# Patient Record
Sex: MALE | Race: BLACK OR AFRICAN AMERICAN | Employment: OTHER | URBAN - METROPOLITAN AREA
[De-identification: names, ages, dates, MRNs, and addresses within clinical notes are randomized per-mention and may not be internally consistent; named-entity substitution may affect disease eponyms.]

---

## 2017-01-01 ENCOUNTER — HOSPITAL ENCOUNTER (OUTPATIENT)
Dept: LAB | Age: 67
Discharge: HOME | End: 2017-11-08
Payer: MEDICARE

## 2017-01-01 ENCOUNTER — HOSPITAL ENCOUNTER (OUTPATIENT)
Dept: LAB | Age: 67
Discharge: HOME | End: 2017-12-06
Payer: MEDICARE

## 2017-01-01 ENCOUNTER — HOSPITAL ENCOUNTER (OUTPATIENT)
Dept: LAB | Age: 67
Discharge: HOME | End: 2017-11-15
Payer: MEDICARE

## 2017-01-01 ENCOUNTER — DOCUMENTATION ONLY (OUTPATIENT)
Dept: HEMATOLOGY | Age: 67
End: 2017-01-01

## 2017-01-01 ENCOUNTER — HOSPITAL ENCOUNTER (OUTPATIENT)
Dept: LAB | Age: 67
Discharge: HOME | End: 2017-12-19
Payer: MEDICARE

## 2017-01-01 ENCOUNTER — HOSPITAL ENCOUNTER (OUTPATIENT)
Dept: LAB | Age: 67
Discharge: HOME | End: 2017-11-29
Payer: MEDICARE

## 2017-01-01 ENCOUNTER — HOSPITAL ENCOUNTER (OUTPATIENT)
Dept: INFUSION THERAPY | Age: 67
Discharge: HOME | End: 2017-12-13
Payer: MEDICARE

## 2017-01-01 ENCOUNTER — HOSPITAL ENCOUNTER (OUTPATIENT)
Dept: INFUSION THERAPY | Age: 67
Discharge: HOME | End: 2017-12-06
Payer: MEDICARE

## 2017-01-01 ENCOUNTER — HOSPITAL ENCOUNTER (OUTPATIENT)
Dept: LAB | Age: 67
Discharge: HOME | End: 2017-12-13
Payer: MEDICARE

## 2017-01-01 ENCOUNTER — HOSPITAL ENCOUNTER (OUTPATIENT)
Dept: INFUSION THERAPY | Age: 67
Discharge: HOME | End: 2017-12-27
Payer: MEDICARE

## 2017-01-01 ENCOUNTER — HOSPITAL ENCOUNTER (OUTPATIENT)
Dept: GENERAL RADIOLOGY | Age: 67
Discharge: HOME | End: 2017-11-02
Attending: INTERNAL MEDICINE
Payer: MEDICARE

## 2017-01-01 ENCOUNTER — APPOINTMENT (OUTPATIENT)
Dept: INFUSION THERAPY | Age: 67
End: 2017-01-01

## 2017-01-01 ENCOUNTER — HOSPITAL ENCOUNTER (OUTPATIENT)
Dept: INFUSION THERAPY | Age: 67
Discharge: HOME | End: 2017-11-15
Payer: MEDICARE

## 2017-01-01 ENCOUNTER — HOSPITAL ENCOUNTER (OUTPATIENT)
Dept: INFUSION THERAPY | Age: 67
Discharge: HOME | End: 2017-11-21
Payer: MEDICARE

## 2017-01-01 ENCOUNTER — HOSPITAL ENCOUNTER (OUTPATIENT)
Dept: LAB | Age: 67
Discharge: HOME | End: 2017-11-21
Payer: MEDICARE

## 2017-01-01 ENCOUNTER — HOSPITAL ENCOUNTER (OUTPATIENT)
Dept: CT IMAGING | Age: 67
Discharge: HOME | End: 2017-11-02
Attending: INTERNAL MEDICINE
Payer: MEDICARE

## 2017-01-01 ENCOUNTER — HOSPITAL ENCOUNTER (OUTPATIENT)
Dept: INFUSION THERAPY | Age: 67
Discharge: HOME | End: 2017-12-19
Payer: MEDICARE

## 2017-01-01 ENCOUNTER — HOSPITAL ENCOUNTER (OUTPATIENT)
Dept: INFUSION THERAPY | Age: 67
Discharge: HOME | End: 2017-11-29
Payer: MEDICARE

## 2017-01-01 VITALS
RESPIRATION RATE: 18 BRPM | DIASTOLIC BLOOD PRESSURE: 97 MMHG | BODY MASS INDEX: 21.63 KG/M2 | OXYGEN SATURATION: 98 % | SYSTOLIC BLOOD PRESSURE: 157 MMHG | HEART RATE: 91 BPM | TEMPERATURE: 98.2 F | WEIGHT: 134 LBS

## 2017-01-01 VITALS
DIASTOLIC BLOOD PRESSURE: 82 MMHG | OXYGEN SATURATION: 98 % | RESPIRATION RATE: 18 BRPM | SYSTOLIC BLOOD PRESSURE: 147 MMHG | HEART RATE: 90 BPM

## 2017-01-01 VITALS — DIASTOLIC BLOOD PRESSURE: 87 MMHG | SYSTOLIC BLOOD PRESSURE: 141 MMHG | HEART RATE: 98 BPM

## 2017-01-01 VITALS — BODY MASS INDEX: 22.98 KG/M2 | HEIGHT: 66 IN | WEIGHT: 143 LBS

## 2017-01-01 VITALS
OXYGEN SATURATION: 97 % | TEMPERATURE: 98.3 F | RESPIRATION RATE: 18 BRPM | HEART RATE: 87 BPM | DIASTOLIC BLOOD PRESSURE: 80 MMHG | SYSTOLIC BLOOD PRESSURE: 154 MMHG

## 2017-01-01 DIAGNOSIS — C07 MALIGNANT TUMOR OF PAROTID GLAND (HCC): ICD-10-CM

## 2017-01-01 DIAGNOSIS — C07 MALIGNANT NEOPLASM OF PAROTID GLAND (HCC): Chronic | ICD-10-CM

## 2017-01-01 DIAGNOSIS — J90 PLEURAL EFFUSION: ICD-10-CM

## 2017-01-01 DIAGNOSIS — C76.0 MALIGNANT NEOPLASM OF HEAD, FACE, AND NECK (HCC): ICD-10-CM

## 2017-01-01 DIAGNOSIS — R59.0 MEDIASTINAL LYMPHADENOPATHY: ICD-10-CM

## 2017-01-01 DIAGNOSIS — R59.9 ADENOPATHY: ICD-10-CM

## 2017-01-01 DIAGNOSIS — R97.8 OTHER ABNORMAL TUMOR MARKERS: ICD-10-CM

## 2017-01-01 DIAGNOSIS — Z93.0 TRACHEOSTOMY IN PLACE (HCC): Chronic | ICD-10-CM

## 2017-01-01 LAB
ALBUMIN SERPL-MCNC: 3 G/DL (ref 3.2–4.6)
ALBUMIN SERPL-MCNC: 3.1 G/DL (ref 3.2–4.6)
ALBUMIN SERPL-MCNC: 3.2 G/DL (ref 3.2–4.6)
ALBUMIN SERPL-MCNC: 3.4 G/DL (ref 3.2–4.6)
ALBUMIN SERPL-MCNC: 3.5 G/DL (ref 3.2–4.6)
ALBUMIN/GLOB SERPL: 0.6 {RATIO}
ALBUMIN/GLOB SERPL: 0.7 {RATIO} (ref 1.2–3.5)
ALBUMIN/GLOB SERPL: 0.8 {RATIO} (ref 1.2–3.5)
ALP SERPL-CCNC: 111 U/L (ref 50–136)
ALP SERPL-CCNC: 115 U/L (ref 50–136)
ALP SERPL-CCNC: 119 U/L (ref 50–136)
ALP SERPL-CCNC: 124 U/L (ref 50–136)
ALP SERPL-CCNC: 125 U/L (ref 50–136)
ALP SERPL-CCNC: 144 U/L (ref 50–136)
ALP SERPL-CCNC: 146 U/L (ref 50–136)
ALP SERPL-CCNC: 156 U/L (ref 50–136)
ALT SERPL-CCNC: 19 U/L (ref 12–65)
ALT SERPL-CCNC: 20 U/L (ref 12–65)
ALT SERPL-CCNC: 22 U/L (ref 12–65)
ALT SERPL-CCNC: 23 U/L (ref 12–65)
ALT SERPL-CCNC: 24 U/L (ref 12–65)
ALT SERPL-CCNC: 26 U/L (ref 12–65)
ALT SERPL-CCNC: 28 U/L (ref 12–65)
ALT SERPL-CCNC: 32 U/L (ref 12–65)
ANION GAP SERPL CALC-SCNC: 6 MMOL/L (ref 7–16)
ANION GAP SERPL CALC-SCNC: 7 MMOL/L
ANION GAP SERPL CALC-SCNC: 7 MMOL/L (ref 7–16)
AST SERPL-CCNC: 12 U/L (ref 15–37)
AST SERPL-CCNC: 13 U/L (ref 15–37)
AST SERPL-CCNC: 15 U/L (ref 15–37)
AST SERPL-CCNC: 17 U/L (ref 15–37)
AST SERPL-CCNC: 17 U/L (ref 15–37)
AST SERPL-CCNC: 19 U/L (ref 15–37)
AST SERPL-CCNC: 20 U/L (ref 15–37)
AST SERPL-CCNC: 30 U/L (ref 15–37)
BASOPHILS # BLD: 0 K/UL (ref 0–0.2)
BASOPHILS NFR BLD: 0 % (ref 0–2)
BASOPHILS NFR BLD: 1 % (ref 0–2)
BILIRUB SERPL-MCNC: 0.2 MG/DL (ref 0.2–1.1)
BILIRUB SERPL-MCNC: 0.3 MG/DL (ref 0.2–1.1)
BILIRUB SERPL-MCNC: 0.4 MG/DL (ref 0.2–1.1)
BUN SERPL-MCNC: 14 MG/DL (ref 8–23)
BUN SERPL-MCNC: 16 MG/DL (ref 8–23)
BUN SERPL-MCNC: 18 MG/DL (ref 8–23)
BUN SERPL-MCNC: 18 MG/DL (ref 8–23)
BUN SERPL-MCNC: 20 MG/DL (ref 8–23)
BUN SERPL-MCNC: 22 MG/DL (ref 8–23)
BUN SERPL-MCNC: 22 MG/DL (ref 8–23)
BUN SERPL-MCNC: 24 MG/DL (ref 8–23)
CALCIUM SERPL-MCNC: 8.7 MG/DL (ref 8.3–10.4)
CALCIUM SERPL-MCNC: 8.7 MG/DL (ref 8.3–10.4)
CALCIUM SERPL-MCNC: 8.8 MG/DL (ref 8.3–10.4)
CALCIUM SERPL-MCNC: 8.8 MG/DL (ref 8.3–10.4)
CALCIUM SERPL-MCNC: 8.9 MG/DL (ref 8.3–10.4)
CALCIUM SERPL-MCNC: 9.2 MG/DL (ref 8.3–10.4)
CALCIUM SERPL-MCNC: 9.6 MG/DL (ref 8.3–10.4)
CALCIUM SERPL-MCNC: 9.7 MG/DL (ref 8.3–10.4)
CEA SERPL-MCNC: 38.4 NG/ML (ref 0–3)
CEA SERPL-MCNC: 60.1 NG/ML (ref 0–3)
CHLORIDE SERPL-SCNC: 100 MMOL/L (ref 98–107)
CHLORIDE SERPL-SCNC: 101 MMOL/L (ref 98–107)
CHLORIDE SERPL-SCNC: 102 MMOL/L (ref 98–107)
CHLORIDE SERPL-SCNC: 91 MMOL/L (ref 98–107)
CHLORIDE SERPL-SCNC: 92 MMOL/L (ref 98–107)
CHLORIDE SERPL-SCNC: 93 MMOL/L (ref 98–107)
CHLORIDE SERPL-SCNC: 94 MMOL/L (ref 98–107)
CHLORIDE SERPL-SCNC: 98 MMOL/L (ref 98–107)
CO2 SERPL-SCNC: 29 MMOL/L (ref 21–32)
CO2 SERPL-SCNC: 30 MMOL/L (ref 21–32)
CO2 SERPL-SCNC: 31 MMOL/L (ref 21–32)
CO2 SERPL-SCNC: 32 MMOL/L (ref 21–32)
CO2 SERPL-SCNC: 33 MMOL/L (ref 21–32)
CO2 SERPL-SCNC: 33 MMOL/L (ref 21–32)
CREAT BLD-MCNC: 0.9 MG/DL (ref 0.8–1.5)
CREAT SERPL-MCNC: 0.61 MG/DL (ref 0.8–1.5)
CREAT SERPL-MCNC: 0.79 MG/DL (ref 0.8–1.5)
CREAT SERPL-MCNC: 0.82 MG/DL (ref 0.8–1.5)
CREAT SERPL-MCNC: 0.87 MG/DL (ref 0.8–1.5)
CREAT SERPL-MCNC: 1 MG/DL (ref 0.8–1.5)
CREAT SERPL-MCNC: 1.01 MG/DL (ref 0.8–1.5)
CREAT SERPL-MCNC: 1.01 MG/DL (ref 0.8–1.5)
CREAT SERPL-MCNC: 1.04 MG/DL (ref 0.8–1.5)
DIFFERENTIAL METHOD BLD: ABNORMAL
EOSINOPHIL # BLD: 0 K/UL (ref 0–0.8)
EOSINOPHIL # BLD: 0.1 K/UL (ref 0–0.8)
EOSINOPHIL # BLD: 0.2 K/UL (ref 0–0.8)
EOSINOPHIL NFR BLD: 0 % (ref 0.5–7.8)
EOSINOPHIL NFR BLD: 1 % (ref 0.5–7.8)
EOSINOPHIL NFR BLD: 1 % (ref 0.5–7.8)
EOSINOPHIL NFR BLD: 2 % (ref 0.5–7.8)
EOSINOPHIL NFR BLD: 2 % (ref 0.5–7.8)
EOSINOPHIL NFR BLD: 3 % (ref 0.5–7.8)
ERYTHROCYTE [DISTWIDTH] IN BLOOD BY AUTOMATED COUNT: 14.1 % (ref 11.9–14.6)
ERYTHROCYTE [DISTWIDTH] IN BLOOD BY AUTOMATED COUNT: 14.3 % (ref 11.9–14.6)
ERYTHROCYTE [DISTWIDTH] IN BLOOD BY AUTOMATED COUNT: 14.3 % (ref 11.9–14.6)
ERYTHROCYTE [DISTWIDTH] IN BLOOD BY AUTOMATED COUNT: 14.6 % (ref 11.9–14.6)
ERYTHROCYTE [DISTWIDTH] IN BLOOD BY AUTOMATED COUNT: 15.1 % (ref 11.9–14.6)
ERYTHROCYTE [DISTWIDTH] IN BLOOD BY AUTOMATED COUNT: 15.4 % (ref 11.9–14.6)
ERYTHROCYTE [DISTWIDTH] IN BLOOD BY AUTOMATED COUNT: 15.8 % (ref 11.9–14.6)
ERYTHROCYTE [DISTWIDTH] IN BLOOD BY AUTOMATED COUNT: 16 % (ref 11.9–14.6)
GLOBULIN SER CALC-MCNC: 3.9 G/DL (ref 2.3–3.5)
GLOBULIN SER CALC-MCNC: 3.9 G/DL (ref 2.3–3.5)
GLOBULIN SER CALC-MCNC: 4.1 G/DL (ref 2.3–3.5)
GLOBULIN SER CALC-MCNC: 4.2 G/DL (ref 2.3–3.5)
GLOBULIN SER CALC-MCNC: 4.2 G/DL (ref 2.3–3.5)
GLOBULIN SER CALC-MCNC: 4.5 G/DL (ref 2.3–3.5)
GLOBULIN SER CALC-MCNC: 4.7 G/DL (ref 2.3–3.5)
GLOBULIN SER CALC-MCNC: 5.3 G/DL
GLUCOSE SERPL-MCNC: 104 MG/DL (ref 65–100)
GLUCOSE SERPL-MCNC: 106 MG/DL (ref 65–100)
GLUCOSE SERPL-MCNC: 113 MG/DL (ref 65–100)
GLUCOSE SERPL-MCNC: 115 MG/DL (ref 65–100)
GLUCOSE SERPL-MCNC: 127 MG/DL (ref 65–100)
GLUCOSE SERPL-MCNC: 129 MG/DL (ref 65–100)
GLUCOSE SERPL-MCNC: 145 MG/DL (ref 65–100)
GLUCOSE SERPL-MCNC: 95 MG/DL (ref 65–100)
HCT VFR BLD AUTO: 35.5 % (ref 41.1–50.3)
HCT VFR BLD AUTO: 35.6 % (ref 41.1–50.3)
HCT VFR BLD AUTO: 36.3 % (ref 41.1–50.3)
HCT VFR BLD AUTO: 36.6 % (ref 41.1–50.3)
HCT VFR BLD AUTO: 36.7 % (ref 41.1–50.3)
HCT VFR BLD AUTO: 37.5 % (ref 41.1–50.3)
HCT VFR BLD AUTO: 38 % (ref 41.1–50.3)
HCT VFR BLD AUTO: 38.8 % (ref 41.1–50.3)
HGB BLD-MCNC: 11.7 G/DL (ref 13.6–17.2)
HGB BLD-MCNC: 11.9 G/DL (ref 13.6–17.2)
HGB BLD-MCNC: 12 G/DL (ref 13.6–17.2)
HGB BLD-MCNC: 12.2 G/DL (ref 13.6–17.2)
HGB BLD-MCNC: 12.3 G/DL (ref 13.6–17.2)
HGB BLD-MCNC: 12.5 G/DL (ref 13.6–17.2)
LYMPHOCYTES # BLD: 0.7 K/UL (ref 0.5–4.6)
LYMPHOCYTES # BLD: 0.7 K/UL (ref 0.5–4.6)
LYMPHOCYTES # BLD: 0.8 K/UL (ref 0.5–4.6)
LYMPHOCYTES # BLD: 0.9 K/UL (ref 0.5–4.6)
LYMPHOCYTES # BLD: 1 K/UL (ref 0.5–4.6)
LYMPHOCYTES NFR BLD: 10 % (ref 13–44)
LYMPHOCYTES NFR BLD: 11 % (ref 13–44)
LYMPHOCYTES NFR BLD: 12 % (ref 13–44)
LYMPHOCYTES NFR BLD: 13 % (ref 13–44)
LYMPHOCYTES NFR BLD: 15 % (ref 13–44)
LYMPHOCYTES NFR BLD: 21 % (ref 13–44)
LYMPHOCYTES NFR BLD: 9 % (ref 13–44)
LYMPHOCYTES NFR BLD: 9 % (ref 13–44)
MAGNESIUM SERPL-MCNC: 1.9 MG/DL (ref 1.8–2.4)
MAGNESIUM SERPL-MCNC: 1.9 MG/DL (ref 1.8–2.4)
MCH RBC QN AUTO: 27.8 PG (ref 26.1–32.9)
MCH RBC QN AUTO: 28.2 PG (ref 26.1–32.9)
MCH RBC QN AUTO: 28.2 PG (ref 26.1–32.9)
MCH RBC QN AUTO: 28.3 PG (ref 26.1–32.9)
MCH RBC QN AUTO: 28.7 PG (ref 26.1–32.9)
MCH RBC QN AUTO: 28.7 PG (ref 26.1–32.9)
MCHC RBC AUTO-ENTMCNC: 32 G/DL (ref 31.4–35)
MCHC RBC AUTO-ENTMCNC: 32.2 G/DL (ref 31.4–35)
MCHC RBC AUTO-ENTMCNC: 32.9 G/DL (ref 31.4–35)
MCHC RBC AUTO-ENTMCNC: 32.9 G/DL (ref 31.4–35)
MCHC RBC AUTO-ENTMCNC: 33.5 G/DL (ref 31.4–35)
MCHC RBC AUTO-ENTMCNC: 33.5 G/DL (ref 31.4–35)
MCHC RBC AUTO-ENTMCNC: 33.6 G/DL (ref 31.4–35)
MCHC RBC AUTO-ENTMCNC: 34.2 G/DL (ref 31.4–35)
MCV RBC AUTO: 82.4 FL (ref 79.6–97.8)
MCV RBC AUTO: 82.9 FL (ref 79.6–97.8)
MCV RBC AUTO: 84.2 FL (ref 79.6–97.8)
MCV RBC AUTO: 84.2 FL (ref 79.6–97.8)
MCV RBC AUTO: 86 FL (ref 79.6–97.8)
MCV RBC AUTO: 86.2 FL (ref 79.6–97.8)
MCV RBC AUTO: 89 FL (ref 79.6–97.8)
MCV RBC AUTO: 89.7 FL (ref 79.6–97.8)
MONOCYTES # BLD: 0.3 K/UL (ref 0.1–1.3)
MONOCYTES # BLD: 0.4 K/UL (ref 0.1–1.3)
MONOCYTES # BLD: 0.4 K/UL (ref 0.1–1.3)
MONOCYTES # BLD: 0.5 K/UL (ref 0.1–1.3)
MONOCYTES # BLD: 0.6 K/UL (ref 0.1–1.3)
MONOCYTES # BLD: 0.7 K/UL (ref 0.1–1.3)
MONOCYTES NFR BLD: 10 % (ref 4–12)
MONOCYTES NFR BLD: 6 % (ref 4–12)
MONOCYTES NFR BLD: 7 % (ref 4–12)
MONOCYTES NFR BLD: 8 % (ref 4–12)
NEUTS SEG # BLD: 3 K/UL (ref 1.7–8.2)
NEUTS SEG # BLD: 4.5 K/UL (ref 1.7–8.2)
NEUTS SEG # BLD: 5 K/UL (ref 1.7–8.2)
NEUTS SEG # BLD: 5.4 K/UL (ref 1.7–8.2)
NEUTS SEG # BLD: 5.7 K/UL (ref 1.7–8.2)
NEUTS SEG # BLD: 6.3 K/UL (ref 1.7–8.2)
NEUTS SEG # BLD: 6.5 K/UL (ref 1.7–8.2)
NEUTS SEG # BLD: 6.7 K/UL (ref 1.7–8.2)
NEUTS SEG NFR BLD: 68 % (ref 43–78)
NEUTS SEG NFR BLD: 75 % (ref 43–78)
NEUTS SEG NFR BLD: 78 % (ref 43–78)
NEUTS SEG NFR BLD: 80 % (ref 43–78)
NEUTS SEG NFR BLD: 81 % (ref 43–78)
NEUTS SEG NFR BLD: 83 % (ref 43–78)
NRBC # BLD: 0 K/UL (ref 0–0.2)
PLATELET # BLD AUTO: 279 K/UL (ref 150–450)
PLATELET # BLD AUTO: 290 K/UL (ref 150–450)
PLATELET # BLD AUTO: 296 K/UL (ref 150–450)
PLATELET # BLD AUTO: 308 K/UL (ref 150–450)
PLATELET # BLD AUTO: 309 K/UL (ref 150–450)
PLATELET # BLD AUTO: 315 K/UL (ref 150–450)
PLATELET # BLD AUTO: 316 K/UL (ref 150–450)
PLATELET # BLD AUTO: 325 K/UL (ref 150–450)
PMV BLD AUTO: 8 FL (ref 10.8–14.1)
PMV BLD AUTO: 8.3 FL (ref 10.8–14.1)
PMV BLD AUTO: 8.3 FL (ref 10.8–14.1)
PMV BLD AUTO: 8.5 FL (ref 10.8–14.1)
PMV BLD AUTO: 8.6 FL (ref 10.8–14.1)
PMV BLD AUTO: 9.5 FL (ref 10.8–14.1)
POTASSIUM SERPL-SCNC: 3.4 MMOL/L (ref 3.5–5.1)
POTASSIUM SERPL-SCNC: 3.7 MMOL/L (ref 3.5–5.1)
POTASSIUM SERPL-SCNC: 3.9 MMOL/L (ref 3.5–5.1)
POTASSIUM SERPL-SCNC: 3.9 MMOL/L (ref 3.5–5.1)
POTASSIUM SERPL-SCNC: 4 MMOL/L (ref 3.5–5.1)
POTASSIUM SERPL-SCNC: 4.1 MMOL/L (ref 3.5–5.1)
POTASSIUM SERPL-SCNC: 4.2 MMOL/L (ref 3.5–5.1)
POTASSIUM SERPL-SCNC: 4.4 MMOL/L (ref 3.5–5.1)
PROT SERPL-MCNC: 6.9 G/DL (ref 6.3–8.2)
PROT SERPL-MCNC: 7 G/DL (ref 6.3–8.2)
PROT SERPL-MCNC: 7.2 G/DL (ref 6.3–8.2)
PROT SERPL-MCNC: 7.3 G/DL (ref 6.3–8.2)
PROT SERPL-MCNC: 7.4 G/DL (ref 6.3–8.2)
PROT SERPL-MCNC: 7.6 G/DL (ref 6.3–8.2)
PROT SERPL-MCNC: 8.2 G/DL (ref 6.3–8.2)
PROT SERPL-MCNC: 8.7 G/DL (ref 6.3–8.2)
RBC # BLD AUTO: 4.14 M/UL (ref 4.23–5.67)
RBC # BLD AUTO: 4.18 M/UL (ref 4.23–5.67)
RBC # BLD AUTO: 4.28 M/UL (ref 4.23–5.67)
RBC # BLD AUTO: 4.31 M/UL (ref 4.23–5.67)
RBC # BLD AUTO: 4.36 M/UL (ref 4.23–5.67)
RBC # BLD AUTO: 4.36 M/UL (ref 4.23–5.67)
RBC # BLD AUTO: 4.41 M/UL (ref 4.23–5.67)
RBC # BLD AUTO: 4.44 M/UL (ref 4.23–5.67)
SODIUM SERPL-SCNC: 130 MMOL/L (ref 136–145)
SODIUM SERPL-SCNC: 130 MMOL/L (ref 136–145)
SODIUM SERPL-SCNC: 132 MMOL/L (ref 136–145)
SODIUM SERPL-SCNC: 133 MMOL/L (ref 136–145)
SODIUM SERPL-SCNC: 134 MMOL/L (ref 136–145)
SODIUM SERPL-SCNC: 138 MMOL/L (ref 136–145)
SODIUM SERPL-SCNC: 138 MMOL/L (ref 136–145)
SODIUM SERPL-SCNC: 141 MMOL/L (ref 136–145)
WBC # BLD AUTO: 4.4 K/UL (ref 4.3–11.1)
WBC # BLD AUTO: 5.7 K/UL (ref 4.3–11.1)
WBC # BLD AUTO: 6.7 K/UL (ref 4.3–11.1)
WBC # BLD AUTO: 6.8 K/UL (ref 4.3–11.1)
WBC # BLD AUTO: 7.1 K/UL (ref 4.3–11.1)
WBC # BLD AUTO: 7.6 K/UL (ref 4.3–11.1)
WBC # BLD AUTO: 7.9 K/UL (ref 4.3–11.1)
WBC # BLD AUTO: 8.1 K/UL (ref 4.3–11.1)

## 2017-01-01 PROCEDURE — 96413 CHEMO IV INFUSION 1 HR: CPT

## 2017-01-01 PROCEDURE — 36415 COLL VENOUS BLD VENIPUNCTURE: CPT | Performed by: INTERNAL MEDICINE

## 2017-01-01 PROCEDURE — 96375 TX/PRO/DX INJ NEW DRUG ADDON: CPT

## 2017-01-01 PROCEDURE — 74011000258 HC RX REV CODE- 258: Performed by: INTERNAL MEDICINE

## 2017-01-01 PROCEDURE — 74011250636 HC RX REV CODE- 250/636: Performed by: INTERNAL MEDICINE

## 2017-01-01 PROCEDURE — 85025 COMPLETE CBC W/AUTO DIFF WBC: CPT | Performed by: INTERNAL MEDICINE

## 2017-01-01 PROCEDURE — 83735 ASSAY OF MAGNESIUM: CPT | Performed by: INTERNAL MEDICINE

## 2017-01-01 PROCEDURE — 74011250636 HC RX REV CODE- 250/636

## 2017-01-01 PROCEDURE — 96367 TX/PROPH/DG ADDL SEQ IV INF: CPT

## 2017-01-01 PROCEDURE — 96415 CHEMO IV INFUSION ADDL HR: CPT

## 2017-01-01 PROCEDURE — 80053 COMPREHEN METABOLIC PANEL: CPT | Performed by: INTERNAL MEDICINE

## 2017-01-01 PROCEDURE — 71020 XR CHEST PA LAT: CPT

## 2017-01-01 PROCEDURE — 74011636320 HC RX REV CODE- 636/320: Performed by: INTERNAL MEDICINE

## 2017-01-01 PROCEDURE — 82565 ASSAY OF CREATININE: CPT

## 2017-01-01 PROCEDURE — 74177 CT ABD & PELVIS W/CONTRAST: CPT

## 2017-01-01 PROCEDURE — 82378 CARCINOEMBRYONIC ANTIGEN: CPT | Performed by: INTERNAL MEDICINE

## 2017-01-01 PROCEDURE — 96361 HYDRATE IV INFUSION ADD-ON: CPT

## 2017-01-01 RX ORDER — SODIUM CHLORIDE 0.9 % (FLUSH) 0.9 %
10 SYRINGE (ML) INJECTION AS NEEDED
Status: ACTIVE | OUTPATIENT
Start: 2017-01-01 | End: 2017-01-01

## 2017-01-01 RX ORDER — DIPHENHYDRAMINE HYDROCHLORIDE 50 MG/ML
50 INJECTION, SOLUTION INTRAMUSCULAR; INTRAVENOUS ONCE
Status: COMPLETED | OUTPATIENT
Start: 2017-01-01 | End: 2017-01-01

## 2017-01-01 RX ORDER — HEPARIN 100 UNIT/ML
300-500 SYRINGE INTRAVENOUS AS NEEDED
Status: ACTIVE | OUTPATIENT
Start: 2017-01-01 | End: 2017-01-01

## 2017-01-01 RX ORDER — DEXAMETHASONE SODIUM PHOSPHATE 4 MG/ML
8 INJECTION, SOLUTION INTRA-ARTICULAR; INTRALESIONAL; INTRAMUSCULAR; INTRAVENOUS; SOFT TISSUE ONCE
Status: COMPLETED | OUTPATIENT
Start: 2017-01-01 | End: 2017-01-01

## 2017-01-01 RX ORDER — DEXAMETHASONE SODIUM PHOSPHATE 100 MG/10ML
10 INJECTION INTRAMUSCULAR; INTRAVENOUS ONCE
Status: COMPLETED | OUTPATIENT
Start: 2017-01-01 | End: 2017-01-01

## 2017-01-01 RX ORDER — LORAZEPAM 2 MG/ML
0.5 INJECTION INTRAMUSCULAR
Status: ACTIVE | OUTPATIENT
Start: 2017-01-01 | End: 2017-01-01

## 2017-01-01 RX ORDER — HYDROCORTISONE SODIUM SUCCINATE 100 MG/2ML
100 INJECTION, POWDER, FOR SOLUTION INTRAMUSCULAR; INTRAVENOUS AS NEEDED
Status: ACTIVE | OUTPATIENT
Start: 2017-01-01 | End: 2017-01-01

## 2017-01-01 RX ORDER — ONDANSETRON 2 MG/ML
8 INJECTION INTRAMUSCULAR; INTRAVENOUS ONCE
Status: COMPLETED | OUTPATIENT
Start: 2017-01-01 | End: 2017-01-01

## 2017-01-01 RX ORDER — SODIUM CHLORIDE 0.9 % (FLUSH) 0.9 %
10 SYRINGE (ML) INJECTION
Status: COMPLETED | OUTPATIENT
Start: 2017-01-01 | End: 2017-01-01

## 2017-01-01 RX ADMIN — DIATRIZOATE MEGLUMINE AND DIATRIZOATE SODIUM 15 ML: 660; 100 LIQUID ORAL; RECTAL at 15:37

## 2017-01-01 RX ADMIN — SODIUM CHLORIDE 500 ML: 900 INJECTION, SOLUTION INTRAVENOUS at 12:00

## 2017-01-01 RX ADMIN — CETUXIMAB 430 MG: 2 SOLUTION INTRAVENOUS at 15:31

## 2017-01-01 RX ADMIN — Medication 10 ML: at 15:30

## 2017-01-01 RX ADMIN — DEXAMETHASONE SODIUM PHOSPHATE 10 MG: 10 INJECTION INTRAMUSCULAR; INTRAVENOUS at 09:19

## 2017-01-01 RX ADMIN — DIPHENHYDRAMINE HYDROCHLORIDE 50 MG: 50 INJECTION, SOLUTION INTRAMUSCULAR; INTRAVENOUS at 12:18

## 2017-01-01 RX ADMIN — ONDANSETRON 8 MG: 2 INJECTION INTRAMUSCULAR; INTRAVENOUS at 15:38

## 2017-01-01 RX ADMIN — SODIUM CHLORIDE 500 ML: 900 INJECTION, SOLUTION INTRAVENOUS at 09:00

## 2017-01-01 RX ADMIN — DIPHENHYDRAMINE HYDROCHLORIDE 50 MG: 50 INJECTION, SOLUTION INTRAMUSCULAR; INTRAVENOUS at 15:41

## 2017-01-01 RX ADMIN — ONDANSETRON 8 MG: 2 INJECTION INTRAMUSCULAR; INTRAVENOUS at 09:15

## 2017-01-01 RX ADMIN — DIPHENHYDRAMINE HYDROCHLORIDE 50 MG: 50 INJECTION, SOLUTION INTRAMUSCULAR; INTRAVENOUS at 10:19

## 2017-01-01 RX ADMIN — SODIUM CHLORIDE 500 ML: 900 INJECTION, SOLUTION INTRAVENOUS at 12:53

## 2017-01-01 RX ADMIN — SODIUM CHLORIDE 500 ML: 900 INJECTION, SOLUTION INTRAVENOUS at 15:37

## 2017-01-01 RX ADMIN — CETUXIMAB 430 MG: 2 SOLUTION INTRAVENOUS at 13:22

## 2017-01-01 RX ADMIN — ONDANSETRON 8 MG: 2 INJECTION INTRAMUSCULAR; INTRAVENOUS at 10:17

## 2017-01-01 RX ADMIN — SODIUM CHLORIDE 500 ML: 900 INJECTION, SOLUTION INTRAVENOUS at 15:00

## 2017-01-01 RX ADMIN — Medication 10 ML: at 12:13

## 2017-01-01 RX ADMIN — SODIUM CHLORIDE 100 ML: 900 INJECTION, SOLUTION INTRAVENOUS at 15:34

## 2017-01-01 RX ADMIN — CETUXIMAB 430 MG: 2 SOLUTION INTRAVENOUS at 16:18

## 2017-01-01 RX ADMIN — DEXAMETHASONE SODIUM PHOSPHATE 8 MG: 4 INJECTION, SOLUTION INTRAMUSCULAR; INTRAVENOUS at 15:24

## 2017-01-01 RX ADMIN — CETUXIMAB 430 MG: 2 SOLUTION INTRAVENOUS at 15:59

## 2017-01-01 RX ADMIN — SODIUM CHLORIDE 150 MG: 900 INJECTION, SOLUTION INTRAVENOUS at 15:55

## 2017-01-01 RX ADMIN — Medication 10 ML: at 14:59

## 2017-01-01 RX ADMIN — SODIUM CHLORIDE 500 ML: 900 INJECTION, SOLUTION INTRAVENOUS at 14:45

## 2017-01-01 RX ADMIN — DIPHENHYDRAMINE HYDROCHLORIDE 50 MG: 50 INJECTION, SOLUTION INTRAMUSCULAR; INTRAVENOUS at 15:21

## 2017-01-01 RX ADMIN — IOPAMIDOL 100 ML: 755 INJECTION, SOLUTION INTRAVENOUS at 15:34

## 2017-01-01 RX ADMIN — CETUXIMAB 688 MG: 2 SOLUTION INTRAVENOUS at 12:05

## 2017-01-01 RX ADMIN — Medication 10 ML: at 15:31

## 2017-01-01 RX ADMIN — SODIUM CHLORIDE 150 MG: 900 INJECTION, SOLUTION INTRAVENOUS at 09:52

## 2017-01-01 RX ADMIN — SODIUM CHLORIDE 1500 ML: 900 INJECTION, SOLUTION INTRAVENOUS at 12:14

## 2017-01-01 RX ADMIN — CETUXIMAB 430 MG: 2 SOLUTION INTRAVENOUS at 10:29

## 2017-01-01 RX ADMIN — CETUXIMAB 430 MG: 2 SOLUTION INTRAVENOUS at 14:02

## 2017-01-01 RX ADMIN — DIPHENHYDRAMINE HYDROCHLORIDE 50 MG: 50 INJECTION, SOLUTION INTRAMUSCULAR; INTRAVENOUS at 09:30

## 2017-01-01 RX ADMIN — DIPHENHYDRAMINE HYDROCHLORIDE 50 MG: 50 INJECTION, SOLUTION INTRAMUSCULAR; INTRAVENOUS at 12:52

## 2017-01-01 RX ADMIN — SODIUM CHLORIDE 150 MG: 900 INJECTION, SOLUTION INTRAVENOUS at 10:30

## 2017-01-01 RX ADMIN — Medication 10 ML: at 15:34

## 2017-01-01 RX ADMIN — DIPHENHYDRAMINE HYDROCHLORIDE 50 MG: 50 INJECTION, SOLUTION INTRAMUSCULAR; INTRAVENOUS at 15:18

## 2017-01-01 RX ADMIN — DEXAMETHASONE SODIUM PHOSPHATE 10 MG: 10 INJECTION INTRAMUSCULAR; INTRAVENOUS at 10:18

## 2017-01-01 RX ADMIN — DEXAMETHASONE SODIUM PHOSPHATE 10 MG: 10 INJECTION INTRAMUSCULAR; INTRAVENOUS at 15:39

## 2017-01-01 RX ADMIN — Medication 10 ML: at 09:00

## 2017-01-18 ENCOUNTER — APPOINTMENT (OUTPATIENT)
Dept: GENERAL RADIOLOGY | Age: 67
End: 2017-01-18
Payer: MEDICARE

## 2017-01-18 ENCOUNTER — HOSPITAL ENCOUNTER (EMERGENCY)
Age: 67
Discharge: HOME | End: 2017-01-18
Payer: MEDICARE

## 2017-01-18 VITALS
HEART RATE: 95 BPM | TEMPERATURE: 98 F | SYSTOLIC BLOOD PRESSURE: 163 MMHG | DIASTOLIC BLOOD PRESSURE: 93 MMHG | RESPIRATION RATE: 16 BRPM | BODY MASS INDEX: 24.11 KG/M2 | OXYGEN SATURATION: 97 % | WEIGHT: 150 LBS | HEIGHT: 66 IN

## 2017-01-18 DIAGNOSIS — Z43.0 ENCOUNTER FOR TRACHEOSTOMY TUBE CHANGE (HCC): Primary | ICD-10-CM

## 2017-01-18 PROCEDURE — 70360 X-RAY EXAM OF NECK: CPT

## 2017-01-18 PROCEDURE — 99283 EMERGENCY DEPT VISIT LOW MDM: CPT

## 2017-01-18 NOTE — PROGRESS NOTES
Pt trach changed. Pt brought his own supplies from home. tach has been placed for 8 months according to pt. Pt appears to be very adequate at trach care. No complications, or distress noted at this time.

## 2017-01-18 NOTE — ED PROVIDER NOTES
HPI Comments: 40-year-old man complaining of problems with his tracheostomy. He stated he felt something was not working properly and that something was stuck behind a trach. He came in for trach change. Patient is a 77 y.o. male presenting with tracheostomy change. The history is provided by the patient. Trach Change   This is a recurrent problem. The current episode started more than 2 days ago. The problem has not changed since onset. Associated symptoms include sputum production. Pertinent negatives include no fever, no headaches, no sore throat, no ear pain, no neck pain, no hemoptysis and no wheezing. He has tried nothing for the symptoms. Associated medical issues include COPD. Past Medical History:   Diagnosis Date    Airway obstruction 4/20/2016    Allergic rhinitis     Anxiety 3/24/2016    BPH (benign prostatic hypertrophy)     Cerumen impaction     Chronic otitis media     Chronic pharyngitis     Condyloma acuminatum     Dyslipidemia 3/24/2016    Dyspnea 4/8/2016    ETD (eustachian tube dysfunction)     Former smoker     GERD (gastroesophageal reflux disease)      at times    History of condyloma acuminatum      on tongue    History of parotid cancer      History of sinus cancer 1989    Hypercholesterolemia 9/14/2016    Hypertension     Hypotension 4/21/2016    Hypothyroidism       secondary to radiation to neck    Ill-defined condition      left face twitching    Impotence of organic origin     Male stress incontinence 12/23/2014    Malignant neoplasm of head, face, and neck ([address] -- prior history.     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     cancer in \"salivary gland\"    Malignant neoplasm of prostate ([address]     Mixed hearing loss     Nasopharyngeal cancer ([address]      w/XRT    Neoplasm of uncertain behavior of tongue     Otitis media     Parotid gland adenocarcinoma (HCC)     Perforation of tympanic membrane     Personal history of prostate cancer  Prostate cancer [address] [address]      diagnosed 2/24/11    SOB (shortness of breath)     Stridor 3/24/2016    Tracheal mass -- reproted sub-glotic 3/25/2016    Tracheal obstruction 3/24/2016    Tracheostomy in place 6/20/2016    Vocal cord paralysis 4/20/2016       Past Surgical History:   Procedure Laterality Date    Hx radical prostatectomy  5/2011      Dr. Ellen Saba in [facility] [address] Hx gi       esophageal dilation    Hx heent  2009     Removal lymph nodes/saliva glands and radiation/chemo    Hx heent  2010     left total parotidectomy & bilateral radical neck dissection    Pr sinus surgery proc unlisted  1989     lymph nodes removed    Hx myringotomy Bilateral 2010, 2011, 2012 2010-Dr. Tiffanie Fishman         Family History:   Problem Relation Age of Onset    Hypertension Mother     Cancer Mother      breast    Hypertension Father     Heart Disease Father        Social History     Social History    Marital status: SINGLE     Spouse name: N/A    Number of children: N/A    Years of education: N/A     Occupational History    [employer]      11 years     Social History Main Topics    Smoking status: Former Smoker     Packs/day: 0.25     Years: 8.00     Types: Cigarettes     Quit date: 4/20/1976    Smokeless tobacco: Never Used      Comment: Quit in the 1970s    Alcohol use No    Drug use: No    Sexual activity: Not on file     Other Topics Concern    Not on file     Social History Narrative    Lives at home and his sister and son live with him. ALLERGIES: Levofloxacin; Other plant, animal, environmental; and Sulfa (sulfonamide antibiotics)    Review of Systems   Constitutional: Negative. Negative for activity change and fever. HENT: Negative. Negative for ear pain and sore throat. Eyes: Negative. Respiratory: Positive for sputum production. Negative for hemoptysis and wheezing. Cardiovascular: Negative. Gastrointestinal: Negative. Genitourinary: Negative.     Musculoskeletal: Negative. Negative for neck pain. Skin: Negative. Neurological: Negative. Negative for headaches. Psychiatric/Behavioral: Negative. All other systems reviewed and are negative. Vitals:    01/18/17 1352   BP: (!) 151/91   Pulse: 98   Resp: 16   Temp: 98 °F (36.7 °C)   SpO2: 96%   Weight: 68 kg (150 lb)   Height: 5' 6\" (1.676 m)            Physical Exam   Constitutional: He is oriented to person, place, and time. He appears well-developed and well-nourished. No distress. HENT:   Head: Normocephalic and atraumatic. Right Ear: External ear normal.   Left Ear: External ear normal.   Nose: Nose normal.   Mouth/Throat: Oropharynx is clear and moist. No oropharyngeal exudate. Eyes: Conjunctivae and EOM are normal. Pupils are equal, round, and reactive to light. Right eye exhibits no discharge. Left eye exhibits no discharge. No scleral icterus. Neck: Normal range of motion. Neck supple. No JVD present. tracheostomy   Cardiovascular: Normal rate, regular rhythm and intact distal pulses. Pulmonary/Chest: Effort normal and breath sounds normal. No stridor. No respiratory distress. He has no wheezes. He exhibits no tenderness. Abdominal: Soft. Bowel sounds are normal. He exhibits no distension and no mass. There is no tenderness. Musculoskeletal: Normal range of motion. He exhibits no edema or tenderness. Neurological: He is alert and oriented to person, place, and time. No cranial nerve deficit. Skin: Skin is warm and dry. No rash noted. He is not diaphoretic. No erythema. No pallor. Psychiatric: He has a normal mood and affect. His behavior is normal. Thought content normal.   Nursing note and vitals reviewed. MDM  Number of Diagnoses or Management Options  Diagnosis management comments: We'll check x-ray and refer him to Dr. Selene Siemens pulmonary. Patient has tried acute change successfully and was breathing easily.        Amount and/or Complexity of Data Reviewed  Clinical lab tests: ordered and reviewed  Tests in the radiology section of CPT®: ordered and reviewed  Tests in the medicine section of CPT®: ordered and reviewed    Risk of Complications, Morbidity, and/or Mortality  Presenting problems: high  Diagnostic procedures: high  Management options: high      ED Course       Procedures

## 2017-01-18 NOTE — ED NOTES
The patient was given their discharge instructions and  was not given prescriptions. The  patient verbalized understanding and had no additional questions. The patient was alert and was discharged via Ambulatory, without additional complaints at time of discharge.   No apparent distress noted

## 2017-01-18 NOTE — DISCHARGE INSTRUCTIONS
Your Tracheostomy: Care Instructions  Your Care Instructions  A tracheostomy is a permanent opening through the neck into the windpipe, or trachea. This opening makes breathing easier if you have a lung or nerve problem or an infection that makes it hard to breathe. Taking good care of a tracheostomy is very important. It can prevent infections and help keep you breathing easily. Follow-up care is a key part of your treatment and safety. Be sure to make and go to all appointments, and call your doctor if you are having problems. Its also a good idea to know your test results and keep a list of the medicines you take. How can you care for yourself at home? General tips  · Always wash your hands before and after caring for your tracheostomy. · Keep the air in your home moist with a room vaporizer. · Eat while sitting up. If any food gets into the tube, suction it out right away. · Wear clothing that is loose around your neck. · In a bath or shower, avoid getting water into the tracheostomy. Cover the tube so that no water gets in but you can still breathe. · Do not swim. · Replace the tube rocha if it gets wet or damaged. If it is not damaged, it can be washed and dried and used again. · Your tracheostomy, or \"trach\" (say \"trayk\"), has three parts: the outer cannula, the inner cannula, and the obturator. The inner cannula is the piece that you will remove and clean. · Your doctor may give you more instructions. Follow them closely. Suctioning  · Suction the trach 3 to 4 times a day, or more if needed. For example, do it before you go to bed and when you wake up in the morning. · You will need suction catheters, a suction machine, saline fluid, a small cup, and a mirror. · Wash your hands with soap and water for at least 30 seconds. · Pour saline fluid into the cup. · Connect a catheter to the suction machine tubing. Dip the catheter tip into the saline fluid.  Insert the wet catheter into the trach. Push the tube in gently, about 3 to 4 inches, until you feel it hit something. · Slowly pull the catheter out of the trach, rolling it back and forth between your fingers, with your thumb over the control valve (this turns the suction on). Do not keep the suction on for more than 10 seconds at a time. · Wait about 30 seconds and repeat inserting and pulling out the tube until all the mucus has been removed. Then suction the saline left in the cup. · Throw away the used catheter, and wash your hands again. Stoma care  A stoma is the opening in your neck. · Clean and dry the stoma 3 times a day. Do not let crust form on the skin at the stoma. · You will need hydrogen peroxide, tap or sterile water, 8 or 10 cotton swabs, 2 small cups, a dry cloth, a mirror, and ointment for the skin. · Wash your hands with soap and water for at least 30 seconds. · Fill one cup with half water and half hydrogen peroxide. Put 3 or 4 cotton swabs in the cup. Fill the other cup with water and put 3 or 4 swabs in that cup. · Clean and remove dried mucus around the stoma with the cotton swabs in the peroxide cup. Then clean off any peroxide with the swabs in the water cup. · Dry your skin with the cloth. Apply ointment with the remaining swabs. · Wash your hands again. Cleaning the inner cannula  A cannula is the tube that fits into the stoma. · Clean and replace the inner cannula 2 or 3 times each day. You will need 2 small bowls, a small brush, hydrogen peroxide, water, and a mirror. · Wash your hands with soap and water for at least 30 seconds. · Pour half water and half hydrogen peroxide into one bowl. Pour a small amount of water in the other bowl. · Unlock the inner cannula and remove it by gently pulling it out and down. Put this into the peroxide bowl to soak. Clean the inside and outside of the cannula with the brush. · Rinse the cannula under tap water, then let it soak in the bowl of water.  Shake the cannula out and slide it gently back into the outer cannula. Turn it to lock it in place. Make sure it is locked in place and you cannot pull it out. · Wash your hands again. When should you call for help? Call 911 anytime you think you may need emergency care. For example, call if:  · You have severe trouble breathing, and coughing or suctioning does not help. · Your trach falls out and you cannot get it back in. Call your doctor now or seek immediate medical care if:  · You have trouble breathing after suctioning. · You have signs of infection, such as:  ¨ Increased pain, swelling, warmth, or redness in the stoma. ¨ Red streaks leading from the stoma. ¨ Pus draining from the stoma. ¨ A fever. Watch closely for changes in your health, and be sure to contact your doctor if you have any problems. Where can you learn more? Go to http://carol-joanie.info/. Enter N364 in the search box to learn more about \"Your Tracheostomy: Care Instructions. \"  Current as of: July 29, 2016  Content Version: 11.1  © 3335-8796 [employer]. Care instructions adapted under license by [employer] (which disclaims liability or warranty for this information). If you have questions about a medical condition or this instruction, always ask your healthcare professional. [place] [address] any warranty or liability for your use of this information.

## 2017-01-26 ENCOUNTER — SURGERY (OUTPATIENT)
Age: 67
End: 2017-01-26

## 2017-01-26 ENCOUNTER — HOSPITAL ENCOUNTER (OUTPATIENT)
Age: 67
Setting detail: OUTPATIENT SURGERY
Discharge: HOME | End: 2017-01-26
Attending: INTERNAL MEDICINE | Admitting: INTERNAL MEDICINE
Payer: MEDICARE

## 2017-01-26 VITALS
DIASTOLIC BLOOD PRESSURE: 89 MMHG | BODY MASS INDEX: 23.07 KG/M2 | WEIGHT: 147 LBS | TEMPERATURE: 98.2 F | RESPIRATION RATE: 18 BRPM | SYSTOLIC BLOOD PRESSURE: 161 MMHG | HEART RATE: 78 BPM | HEIGHT: 67 IN | OXYGEN SATURATION: 100 %

## 2017-01-26 PROCEDURE — 99211 OFF/OP EST MAY X REQ PHY/QHP: CPT | Performed by: INTERNAL MEDICINE

## 2017-01-26 DEVICE — TRACHEOSTOMY TUBE CUFFLESS WITH INNER CANNULA
Type: IMPLANTABLE DEVICE | Site: NECK | Status: FUNCTIONAL
Brand: SHILEY

## 2017-01-26 NOTE — PROGRESS NOTES
Pt comes in today for trach change. Consent obtained. Time out performed. Pt's site looks good. Pt is using PMV for speaking. Old trach removed without difficulty, new size #6 Shiley Cuffles inserted into stoma without difficulty and secured with same trach ties. Discharge instructions reviewed with patient. Pt to follow up with [facility]monary. Pt d/gennaro via ambulating. NO resp distress noted. All vitals stable upon discharge.

## 2017-05-08 ENCOUNTER — HOSPITAL ENCOUNTER (EMERGENCY)
Age: 67
Discharge: HOME | End: 2017-05-08
Attending: EMERGENCY MEDICINE
Payer: MEDICARE

## 2017-05-08 ENCOUNTER — APPOINTMENT (OUTPATIENT)
Dept: GENERAL RADIOLOGY | Age: 67
End: 2017-05-08
Attending: EMERGENCY MEDICINE
Payer: MEDICARE

## 2017-05-08 VITALS
BODY MASS INDEX: 26.58 KG/M2 | RESPIRATION RATE: 20 BRPM | WEIGHT: 150 LBS | SYSTOLIC BLOOD PRESSURE: 138 MMHG | HEIGHT: 63 IN | TEMPERATURE: 98.5 F | HEART RATE: 92 BPM | DIASTOLIC BLOOD PRESSURE: 96 MMHG | OXYGEN SATURATION: 100 %

## 2017-05-08 DIAGNOSIS — K59.03 DRUG-INDUCED CONSTIPATION: Primary | ICD-10-CM

## 2017-05-08 PROCEDURE — 99283 EMERGENCY DEPT VISIT LOW MDM: CPT | Performed by: EMERGENCY MEDICINE

## 2017-05-08 PROCEDURE — 74000 XR ABD (KUB): CPT

## 2017-05-08 RX ORDER — POLYETHYLENE GLYCOL 3350 17 G/17G
17 POWDER, FOR SOLUTION ORAL DAILY
Qty: 238 G | Refills: 0 | Status: SHIPPED | OUTPATIENT
Start: 2017-05-08 | End: 2017-01-01

## 2017-05-09 NOTE — ED PROVIDER NOTES
HPI Comments: Patient is a 78-year-old male with a feeding tube and tracheostomy secondary to cancer. States that he was started on some new pain medication a few days ago and now he has not had a bowel movement in 2 days. He denies any vomiting. He is tolerating his PEG tube feeds without difficulty. He denies any abdominal pain. Patient is a 77 y.o. male presenting with constipation and tracheostomy change. The history is provided by the patient. Constipation    This is a new problem. The current episode started 2 days ago. Associated symptoms include constipation. Pertinent negatives include no abdominal pain, no flatus, no abdominal distention, no vomiting and no diarrhea. He has tried nothing for the symptoms. Trach Change   Pertinent negatives include no vomiting and no abdominal pain. Past Medical History:   Diagnosis Date    Airway obstruction 4/20/2016    Allergic rhinitis     Anxiety 3/24/2016    BPH (benign prostatic hypertrophy)     Cerumen impaction     Chronic otitis media     Chronic pharyngitis     Condyloma acuminatum     Dyslipidemia 3/24/2016    Dyspnea 4/8/2016    ETD (eustachian tube dysfunction)     Former smoker     GERD (gastroesophageal reflux disease)     at times    History of condyloma acuminatum     on tongue    History of parotid cancer      History of sinus cancer 1989    Hypercholesterolemia 9/14/2016    Hypertension     Hypotension 4/21/2016    Hypothyroidism      secondary to radiation to neck    Ill-defined condition     left face twitching    Impotence of organic origin     Male stress incontinence 12/23/2014    Malignant neoplasm of head, face, and neck ([address] -- prior history.     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     Perforation of tympanic membrane     Personal history of prostate cancer     Prostate cancer ([facility] [address]     diagnosed 2/24/11    SOB (shortness of breath)     Stridor 3/24/2016    Tracheal mass -- reproted sub-glotic 3/25/2016    Tracheal obstruction 3/24/2016    Tracheostomy in place [facility]) 6/20/2016    Vocal cord paralysis 4/20/2016       Past Surgical History:   Procedure Laterality Date    HX GI      esophageal dilation    HX HEENT  2009    Removal lymph nodes/saliva glands and radiation/chemo    HX HEENT  2010    left total parotidectomy & bilateral radical neck dissection    HX MYRINGOTOMY Bilateral 2010, 2011, 2012 2010-Dr. Vitaly Mcdowell  5/2011     Dr. Jose E Pearce in [place]    lymph nodes removed         Family History:   Problem Relation Age of Onset    Hypertension Mother     Cancer Mother      breast    Hypertension Father     Heart Disease Father        Social History     Social History    Marital status: SINGLE     Spouse name: N/A    Number of children: N/A    Years of education: N/A     Occupational History    [employer]      11 years     Social History Main Topics    Smoking status: Former Smoker     Packs/day: 0.25     Years: 8.00     Types: Cigarettes     Quit date: 4/20/1976    Smokeless tobacco: Never Used      Comment: Quit in the 1970s    Alcohol use No    Drug use: No    Sexual activity: Not on file     Other Topics Concern    Not on file     Social History Narrative    Lives at home and his sister and son live with him. ALLERGIES: Levofloxacin; Other plant, animal, environmental; and Sulfa (sulfonamide antibiotics)    Review of Systems   Constitutional: Negative. HENT: Negative. Eyes: Negative. Respiratory: Negative. Cardiovascular: Negative. Gastrointestinal: Positive for constipation.  Negative for abdominal distention, abdominal pain, blood in stool, diarrhea, flatus, rectal pain and vomiting. Endocrine: Negative. Genitourinary: Negative. Neurological: Negative. Vitals:    05/08/17 2053   BP: (!) 152/101   Pulse: 94   Resp: 24   Temp: 98.6 °F (37 °C)   SpO2: 98%   Weight: 68 kg (150 lb)   Height: 5' 3\" (1.6 m)            Physical Exam   Constitutional: He appears well-developed and well-nourished. HENT:   Head: Normocephalic and atraumatic. Cardiovascular: Normal rate and regular rhythm. Abdominal: Soft. There is no tenderness. There is no rebound and no guarding. PEG tube site in the left upper quadrant. Genitourinary: Rectum normal. Rectal exam shows guaiac negative stool. Genitourinary Comments: no impaction or stool in the vault. Skin: Skin is warm and dry. Vitals reviewed. MDM  Number of Diagnoses or Management Options  Diagnosis management comments: 60-year-old male with no stool output in the past 2 days. Different differential diagnosis includes constipation, bowel obstruction, fecal impaction    There is no impaction on rectal exam abdominal exam is completely benign as not vomiting. He's been eating and drinking normally. We'll place him on some MiraLAX and I have advised him to follow up with his doctor.        Amount and/or Complexity of Data Reviewed  Tests in the radiology section of CPT®: reviewed    Risk of Complications, Morbidity, and/or Mortality  Presenting problems: low  Diagnostic procedures: low  Management options: low      ED Course       Procedures

## 2017-05-09 NOTE — ED TRIAGE NOTES
Patient arrives to the ER via POV. Patient states he has not had any output in his ostomy in three maybe four days. Patient states he has been eating and drinking appropriately. Patient also states in triage he thinks he has some mucous in his trach. Patient denies SOB, CP, or difficulty swallowing.

## 2017-09-01 PROBLEM — J90 PLEURAL EFFUSION: Status: ACTIVE | Noted: 2017-01-01

## 2017-09-01 PROBLEM — R59.0 MEDIASTINAL LYMPHADENOPATHY: Status: ACTIVE | Noted: 2017-01-01

## 2017-11-10 NOTE — PROGRESS NOTES
I spoke with Mr. Lombardi regarding his insurance coverage, potential oral medication authorizations, enrollment in the [facility] [employer] ([facility]) and the [facility] ([address]), and assistance organization resource sheet. Mr. Dayday Cedeño has Medicare and a Medicare supplement plan N with [employer]. The only patient responsibility Mr. Lombardi has for approved claims is the Medicare part B deductible. I gave Mr. Lombardi the Oncology Care Model participation letter. I let him know that the average patient responsibility for 6 months of treatment for type cancer is $8,025 after Medicare pays. Next, I spoke with Mr. Lombardi regarding potential oral medication authorizations. I told him that if he ever had any problems getting his oral medications filled to give the dedicated [facility] , Edilma Sue, a call. Most of the time, it is simply an authorization that needs to be done with the insurance company. Next, I spoke with Mr. Lombardi regarding enrolling with [facility] and [facility]. I went over some of the services that [facility] and [facility] offers and the enrollment process. Lastly, I gave Mr. Lombardi a form with various resource organizations that could assist with specific needs (example:  transportation, lodging, preparing meals, home cleaning)     Faxed Patient Referral form to the [facility] [employer] at [phone]. Phone [phone]. Form scanned into chart. Faxed Physician's Statement to the [facility] at [phone]. Phone [phone]. Form scanned into chart.

## 2017-11-15 NOTE — PROGRESS NOTES
Arrived to the [facility]. D1C1 Erbitux completed. Patient tolerated without problems. Patient stayed for 1 hour post infusion observation without s/sx of reaction  Any issues or concerns during appointment: extended wait time for Erbitux medication to be delivered to the [facility]. Patient did not have Zofran or Compazine at home, medications sent to pharmacy by Triage.  Patient and his sister were given written instruction regarding use of antiemetics at home  Patient aware of next infusion appointment on 11/21/17 at 200  Discharged via [facility] with family

## 2017-11-21 NOTE — PROGRESS NOTES
Arrived to the [facility]. Erbitux completed. Patient tolerated well. Any issues or concerns during appointment: none. Patient aware of next infusion appointment on 11/29 (date) at [phone] (time). Discharged home.

## 2017-11-29 NOTE — PROGRESS NOTES
Pt arrived via w/c to [facility]. IV established with good blood return. NS 1500 ml infusing. Benadryl given IV as ordered. Erbitux infused over 1 hour. Report giiven to [employer].

## 2017-11-29 NOTE — PROGRESS NOTES
Erbitux completed and remainder of 1500 mL bolus infused. Patient tolerated well. PIV removed. VSS upon discharge. Discharged via wheelchair accompanied by spouse.      Brenda Coffey RN

## 2017-12-06 NOTE — PROGRESS NOTES
Pt arrived via w/c to [facility]. IV established with good blood return. NS infusing. Pre meds given IV as ordered. erbitux infused over 1 hour. Pt monitored. Pt tolerated well. IV discontinued with tip intact Pt aware of next appt on 12/13/17 at 1500. Pt discharged via w/c.

## 2017-12-13 NOTE — PROGRESS NOTES
Pt arrived via w/c to [facility]. IV established with good blood return. NS infusing. Benadryl 50 mg given iv. erbitux infused over 1 hour. Pt monitored. Pt tolerated well. IV discontinued with tip intact. Pt aware of next appt on 1/17/17 at 0915.  Pt discharged via w/c

## 2017-12-19 NOTE — PROGRESS NOTES
Arrived to the [facility] via [facility] with his sister. chemo completed. Patient tolerated well. Any issues or concerns during appointment:  no.  Patient aware of next infusion appointment on  12/27 at 0800. Discharged to home via [facility].

## 2017-12-27 NOTE — PROGRESS NOTES
Arrived to the [facility] via [facility] with his sister. chemo completed. Patient tolerated well. Patient communicates by writing questions and family member. Questions asked about having a port placed -  suggested they discuss with Dr. Parth Estrada. Any issues or concerns during appointment:  no.  Patient aware of next infusion appointment on 1/2/18 @ 1400.   Discharged to home via [facility]

## 2018-01-01 ENCOUNTER — APPOINTMENT (OUTPATIENT)
Dept: GENERAL RADIOLOGY | Age: 68
DRG: 178 | End: 2018-01-01
Attending: INTERNAL MEDICINE
Payer: MEDICARE

## 2018-01-01 ENCOUNTER — APPOINTMENT (OUTPATIENT)
Dept: GENERAL RADIOLOGY | Age: 68
End: 2018-01-01
Attending: EMERGENCY MEDICINE
Payer: MEDICARE

## 2018-01-01 ENCOUNTER — HOME CARE VISIT (OUTPATIENT)
Dept: SCHEDULING | Facility: HOME HEALTH | Age: 68
End: 2018-01-01
Payer: MEDICARE

## 2018-01-01 ENCOUNTER — HOME CARE VISIT (OUTPATIENT)
Dept: HOSPICE | Facility: HOSPICE | Age: 68
End: 2018-01-01
Payer: MEDICARE

## 2018-01-01 ENCOUNTER — HOSPITAL ENCOUNTER (OUTPATIENT)
Dept: INFUSION THERAPY | Age: 68
Discharge: HOME | End: 2018-01-15
Payer: MEDICARE

## 2018-01-01 ENCOUNTER — PATIENT OUTREACH (OUTPATIENT)
Dept: RESPIRATORY THERAPY | Age: 68
End: 2018-01-01

## 2018-01-01 ENCOUNTER — HOSPITAL ENCOUNTER (OUTPATIENT)
Dept: INFUSION THERAPY | Age: 68
End: 2018-01-01

## 2018-01-01 ENCOUNTER — HOSPITAL ENCOUNTER (OUTPATIENT)
Dept: PHYSICAL THERAPY | Age: 68
End: 2018-01-01

## 2018-01-01 ENCOUNTER — HOSPITAL ENCOUNTER (EMERGENCY)
Age: 68
Discharge: HOME | End: 2018-01-21
Attending: EMERGENCY MEDICINE
Payer: MEDICARE

## 2018-01-01 ENCOUNTER — HOSPITAL ENCOUNTER (OUTPATIENT)
Dept: LAB | Age: 68
Discharge: HOME | End: 2018-01-15
Payer: MEDICARE

## 2018-01-01 ENCOUNTER — HOSPITAL ENCOUNTER (EMERGENCY)
Age: 68
Discharge: HOME | End: 2018-02-11
Attending: EMERGENCY MEDICINE
Payer: MEDICARE

## 2018-01-01 ENCOUNTER — APPOINTMENT (OUTPATIENT)
Dept: GENERAL RADIOLOGY | Age: 68
DRG: 178 | End: 2018-01-01
Attending: EMERGENCY MEDICINE
Payer: MEDICARE

## 2018-01-01 ENCOUNTER — PATIENT OUTREACH (OUTPATIENT)
Dept: CASE MANAGEMENT | Age: 68
End: 2018-01-01

## 2018-01-01 ENCOUNTER — HOSPITAL ENCOUNTER (EMERGENCY)
Age: 68
Discharge: HOME | End: 2018-01-20
Attending: EMERGENCY MEDICINE
Payer: MEDICARE

## 2018-01-01 ENCOUNTER — HOSPITAL ENCOUNTER (OUTPATIENT)
Dept: CT IMAGING | Age: 68
Discharge: HOME | End: 2018-01-26
Attending: INTERNAL MEDICINE
Payer: MEDICARE

## 2018-01-01 ENCOUNTER — HOSPITAL ENCOUNTER (OUTPATIENT)
Dept: INFUSION THERAPY | Age: 68
End: 2018-01-01
Payer: MEDICARE

## 2018-01-01 ENCOUNTER — HOSPITAL ENCOUNTER (EMERGENCY)
Age: 68
Discharge: HOME | End: 2018-04-07
Attending: EMERGENCY MEDICINE
Payer: OTHER MISCELLANEOUS

## 2018-01-01 ENCOUNTER — HOSPITAL ENCOUNTER (OUTPATIENT)
Dept: LAB | Age: 68
Discharge: HOME | End: 2018-01-31
Payer: MEDICARE

## 2018-01-01 ENCOUNTER — HOSPITAL ENCOUNTER (EMERGENCY)
Age: 68
Discharge: HOME | End: 2018-03-13
Attending: EMERGENCY MEDICINE | Admitting: INTERNAL MEDICINE
Payer: MEDICARE

## 2018-01-01 ENCOUNTER — HOSPITAL ENCOUNTER (INPATIENT)
Age: 68
LOS: 12 days | Discharge: HOME HEALTH | DRG: 178 | End: 2018-03-07
Attending: EMERGENCY MEDICINE | Admitting: INTERNAL MEDICINE
Payer: MEDICARE

## 2018-01-01 ENCOUNTER — HOSPICE ADMISSION (OUTPATIENT)
Dept: HOSPICE | Facility: HOSPICE | Age: 68
End: 2018-01-01
Payer: MEDICARE

## 2018-01-01 ENCOUNTER — HOSPITAL ENCOUNTER (OUTPATIENT)
Dept: LAB | Age: 68
Discharge: HOME | End: 2018-02-14
Payer: MEDICARE

## 2018-01-01 ENCOUNTER — APPOINTMENT (OUTPATIENT)
Dept: GENERAL RADIOLOGY | Age: 68
End: 2018-01-01
Attending: STUDENT IN AN ORGANIZED HEALTH CARE EDUCATION/TRAINING PROGRAM
Payer: MEDICARE

## 2018-01-01 ENCOUNTER — APPOINTMENT (OUTPATIENT)
Dept: INFUSION THERAPY | Age: 68
End: 2018-01-01
Payer: MEDICARE

## 2018-01-01 ENCOUNTER — HOSPITAL ENCOUNTER (OUTPATIENT)
Dept: LAB | Age: 68
Discharge: HOME | End: 2018-01-02
Payer: MEDICARE

## 2018-01-01 ENCOUNTER — APPOINTMENT (OUTPATIENT)
Dept: CT IMAGING | Age: 68
End: 2018-01-01
Attending: EMERGENCY MEDICINE
Payer: MEDICARE

## 2018-01-01 ENCOUNTER — HOSPITAL ENCOUNTER (OUTPATIENT)
Dept: INFUSION THERAPY | Age: 68
Discharge: HOME | End: 2018-01-08
Payer: MEDICARE

## 2018-01-01 ENCOUNTER — HOME CARE VISIT (OUTPATIENT)
Dept: SCHEDULING | Facility: HOME HEALTH | Age: 68
End: 2018-01-01

## 2018-01-01 ENCOUNTER — APPOINTMENT (OUTPATIENT)
Dept: CT IMAGING | Age: 68
End: 2018-01-01
Attending: STUDENT IN AN ORGANIZED HEALTH CARE EDUCATION/TRAINING PROGRAM
Payer: MEDICARE

## 2018-01-01 ENCOUNTER — HOSPITAL ENCOUNTER (OUTPATIENT)
Dept: INFUSION THERAPY | Age: 68
Discharge: HOME | End: 2018-01-02
Payer: MEDICARE

## 2018-01-01 ENCOUNTER — APPOINTMENT (OUTPATIENT)
Dept: CT IMAGING | Age: 68
DRG: 178 | End: 2018-01-01
Attending: EMERGENCY MEDICINE
Payer: MEDICARE

## 2018-01-01 ENCOUNTER — HOSPITAL ENCOUNTER (OUTPATIENT)
Dept: INFUSION THERAPY | Age: 68
Discharge: HOME | End: 2018-01-31
Payer: MEDICARE

## 2018-01-01 ENCOUNTER — HOME HEALTH ADMISSION (OUTPATIENT)
Dept: HOME HEALTH SERVICES | Facility: HOME HEALTH | Age: 68
End: 2018-01-01

## 2018-01-01 ENCOUNTER — HOSPITAL ENCOUNTER (OUTPATIENT)
Dept: LAB | Age: 68
Discharge: HOME | End: 2018-02-07
Payer: MEDICARE

## 2018-01-01 ENCOUNTER — HOSPITAL ENCOUNTER (OUTPATIENT)
Dept: INFUSION THERAPY | Age: 68
Discharge: HOME | End: 2018-02-14
Payer: MEDICARE

## 2018-01-01 ENCOUNTER — DOCUMENTATION ONLY (OUTPATIENT)
Dept: OTHER | Age: 68
End: 2018-01-01

## 2018-01-01 ENCOUNTER — HOME CARE VISIT (OUTPATIENT)
Dept: HOME HEALTH SERVICES | Facility: HOME HEALTH | Age: 68
End: 2018-01-01

## 2018-01-01 ENCOUNTER — APPOINTMENT (OUTPATIENT)
Dept: GENERAL RADIOLOGY | Age: 68
End: 2018-01-01
Attending: EMERGENCY MEDICINE
Payer: OTHER MISCELLANEOUS

## 2018-01-01 ENCOUNTER — HOSPITAL ENCOUNTER (EMERGENCY)
Age: 68
Discharge: HOME | End: 2018-02-12
Attending: STUDENT IN AN ORGANIZED HEALTH CARE EDUCATION/TRAINING PROGRAM
Payer: MEDICARE

## 2018-01-01 VITALS
RESPIRATION RATE: 24 BRPM | HEART RATE: 82 BPM | HEIGHT: 66 IN | SYSTOLIC BLOOD PRESSURE: 110 MMHG | TEMPERATURE: 98.7 F | OXYGEN SATURATION: 97 % | BODY MASS INDEX: 20.25 KG/M2 | DIASTOLIC BLOOD PRESSURE: 69 MMHG | WEIGHT: 126 LBS

## 2018-01-01 VITALS
SYSTOLIC BLOOD PRESSURE: 122 MMHG | OXYGEN SATURATION: 94 % | DIASTOLIC BLOOD PRESSURE: 68 MMHG | HEART RATE: 80 BPM | RESPIRATION RATE: 16 BRPM

## 2018-01-01 VITALS
OXYGEN SATURATION: 90 % | TEMPERATURE: 98.6 F | RESPIRATION RATE: 20 BRPM | WEIGHT: 126 LBS | SYSTOLIC BLOOD PRESSURE: 125 MMHG | DIASTOLIC BLOOD PRESSURE: 72 MMHG | HEIGHT: 66 IN | BODY MASS INDEX: 20.25 KG/M2 | HEART RATE: 77 BPM

## 2018-01-01 VITALS
SYSTOLIC BLOOD PRESSURE: 164 MMHG | HEART RATE: 99 BPM | TEMPERATURE: 98 F | OXYGEN SATURATION: 97 % | DIASTOLIC BLOOD PRESSURE: 97 MMHG | RESPIRATION RATE: 17 BRPM

## 2018-01-01 VITALS
DIASTOLIC BLOOD PRESSURE: 70 MMHG | RESPIRATION RATE: 18 BRPM | TEMPERATURE: 97.4 F | SYSTOLIC BLOOD PRESSURE: 110 MMHG | HEART RATE: 78 BPM

## 2018-01-01 VITALS
HEART RATE: 84 BPM | OXYGEN SATURATION: 92 % | RESPIRATION RATE: 14 BRPM | SYSTOLIC BLOOD PRESSURE: 98 MMHG | DIASTOLIC BLOOD PRESSURE: 64 MMHG

## 2018-01-01 VITALS
HEART RATE: 86 BPM | RESPIRATION RATE: 16 BRPM | DIASTOLIC BLOOD PRESSURE: 88 MMHG | WEIGHT: 130.8 LBS | OXYGEN SATURATION: 97 % | BODY MASS INDEX: 21.11 KG/M2 | TEMPERATURE: 98.1 F | SYSTOLIC BLOOD PRESSURE: 155 MMHG

## 2018-01-01 VITALS
SYSTOLIC BLOOD PRESSURE: 156 MMHG | HEART RATE: 90 BPM | TEMPERATURE: 98.4 F | DIASTOLIC BLOOD PRESSURE: 90 MMHG | OXYGEN SATURATION: 96 % | BODY MASS INDEX: 20.57 KG/M2 | WEIGHT: 128 LBS | RESPIRATION RATE: 18 BRPM | HEIGHT: 66 IN

## 2018-01-01 VITALS
OXYGEN SATURATION: 100 % | RESPIRATION RATE: 18 BRPM | DIASTOLIC BLOOD PRESSURE: 75 MMHG | HEART RATE: 89 BPM | SYSTOLIC BLOOD PRESSURE: 122 MMHG | TEMPERATURE: 98 F

## 2018-01-01 VITALS
TEMPERATURE: 98.5 F | HEART RATE: 103 BPM | OXYGEN SATURATION: 100 % | BODY MASS INDEX: 19.29 KG/M2 | RESPIRATION RATE: 20 BRPM | HEIGHT: 66 IN | DIASTOLIC BLOOD PRESSURE: 86 MMHG | SYSTOLIC BLOOD PRESSURE: 166 MMHG | WEIGHT: 120 LBS

## 2018-01-01 VITALS
SYSTOLIC BLOOD PRESSURE: 142 MMHG | DIASTOLIC BLOOD PRESSURE: 82 MMHG | HEART RATE: 92 BPM | OXYGEN SATURATION: 92 % | TEMPERATURE: 97.6 F | RESPIRATION RATE: 18 BRPM

## 2018-01-01 VITALS
SYSTOLIC BLOOD PRESSURE: 120 MMHG | DIASTOLIC BLOOD PRESSURE: 80 MMHG | OXYGEN SATURATION: 93 % | HEART RATE: 100 BPM | RESPIRATION RATE: 15 BRPM

## 2018-01-01 VITALS
SYSTOLIC BLOOD PRESSURE: 142 MMHG | HEART RATE: 84 BPM | DIASTOLIC BLOOD PRESSURE: 80 MMHG | RESPIRATION RATE: 10 BRPM | OXYGEN SATURATION: 92 %

## 2018-01-01 VITALS
WEIGHT: 129.2 LBS | BODY MASS INDEX: 20.85 KG/M2 | TEMPERATURE: 98.5 F | SYSTOLIC BLOOD PRESSURE: 130 MMHG | DIASTOLIC BLOOD PRESSURE: 84 MMHG | HEART RATE: 112 BPM | OXYGEN SATURATION: 99 % | RESPIRATION RATE: 18 BRPM

## 2018-01-01 VITALS
TEMPERATURE: 98.2 F | SYSTOLIC BLOOD PRESSURE: 140 MMHG | HEART RATE: 63 BPM | RESPIRATION RATE: 10 BRPM | DIASTOLIC BLOOD PRESSURE: 86 MMHG

## 2018-01-01 VITALS
DIASTOLIC BLOOD PRESSURE: 62 MMHG | SYSTOLIC BLOOD PRESSURE: 110 MMHG | HEART RATE: 76 BPM | OXYGEN SATURATION: 92 % | RESPIRATION RATE: 17 BRPM

## 2018-01-01 VITALS
HEART RATE: 93 BPM | TEMPERATURE: 98.9 F | RESPIRATION RATE: 16 BRPM | OXYGEN SATURATION: 95 % | SYSTOLIC BLOOD PRESSURE: 154 MMHG | DIASTOLIC BLOOD PRESSURE: 103 MMHG

## 2018-01-01 VITALS — SYSTOLIC BLOOD PRESSURE: 142 MMHG | TEMPERATURE: 98 F | HEART RATE: 82 BPM | DIASTOLIC BLOOD PRESSURE: 98 MMHG

## 2018-01-01 VITALS
HEART RATE: 90 BPM | RESPIRATION RATE: 14 BRPM | DIASTOLIC BLOOD PRESSURE: 52 MMHG | SYSTOLIC BLOOD PRESSURE: 72 MMHG | OXYGEN SATURATION: 66 %

## 2018-01-01 VITALS
HEART RATE: 74 BPM | TEMPERATURE: 97.5 F | HEART RATE: 112 BPM | SYSTOLIC BLOOD PRESSURE: 95 MMHG | TEMPERATURE: 97.7 F | DIASTOLIC BLOOD PRESSURE: 87 MMHG | DIASTOLIC BLOOD PRESSURE: 67 MMHG | RESPIRATION RATE: 12 BRPM | RESPIRATION RATE: 13 BRPM | SYSTOLIC BLOOD PRESSURE: 133 MMHG

## 2018-01-01 VITALS
RESPIRATION RATE: 16 BRPM | TEMPERATURE: 98.7 F | SYSTOLIC BLOOD PRESSURE: 102 MMHG | OXYGEN SATURATION: 95 % | DIASTOLIC BLOOD PRESSURE: 60 MMHG | HEART RATE: 80 BPM

## 2018-01-01 VITALS
HEART RATE: 96 BPM | BODY MASS INDEX: 20.57 KG/M2 | WEIGHT: 128 LBS | DIASTOLIC BLOOD PRESSURE: 66 MMHG | RESPIRATION RATE: 18 BRPM | TEMPERATURE: 97.6 F | HEIGHT: 66 IN | SYSTOLIC BLOOD PRESSURE: 121 MMHG | OXYGEN SATURATION: 98 %

## 2018-01-01 VITALS — BODY MASS INDEX: 20.89 KG/M2 | HEIGHT: 66 IN | WEIGHT: 130 LBS

## 2018-01-01 VITALS — DIASTOLIC BLOOD PRESSURE: 60 MMHG | HEART RATE: 88 BPM | RESPIRATION RATE: 18 BRPM | SYSTOLIC BLOOD PRESSURE: 118 MMHG

## 2018-01-01 VITALS
HEART RATE: 85 BPM | SYSTOLIC BLOOD PRESSURE: 148 MMHG | DIASTOLIC BLOOD PRESSURE: 72 MMHG | RESPIRATION RATE: 10 BRPM | TEMPERATURE: 97.4 F

## 2018-01-01 VITALS
OXYGEN SATURATION: 99 % | SYSTOLIC BLOOD PRESSURE: 106 MMHG | DIASTOLIC BLOOD PRESSURE: 74 MMHG | HEART RATE: 72 BPM | RESPIRATION RATE: 18 BRPM | TEMPERATURE: 97.7 F

## 2018-01-01 VITALS — HEART RATE: 72 BPM | SYSTOLIC BLOOD PRESSURE: 110 MMHG | RESPIRATION RATE: 16 BRPM | DIASTOLIC BLOOD PRESSURE: 74 MMHG

## 2018-01-01 DIAGNOSIS — G89.3 CANCER RELATED PAIN: ICD-10-CM

## 2018-01-01 DIAGNOSIS — J98.8 AIRWAY OBSTRUCTION: ICD-10-CM

## 2018-01-01 DIAGNOSIS — C07 MALIGNANT NEOPLASM OF PAROTID GLAND (HCC): Chronic | ICD-10-CM

## 2018-01-01 DIAGNOSIS — Z93.0 TRACHEOSTOMY DEPENDENCE (HCC): ICD-10-CM

## 2018-01-01 DIAGNOSIS — C76.0 MALIGNANT NEOPLASM OF HEAD, FACE, AND NECK (HCC): Chronic | ICD-10-CM

## 2018-01-01 DIAGNOSIS — E86.0 MILD DEHYDRATION: ICD-10-CM

## 2018-01-01 DIAGNOSIS — N39.3 MALE STRESS INCONTINENCE: ICD-10-CM

## 2018-01-01 DIAGNOSIS — R63.4 WEIGHT LOSS: ICD-10-CM

## 2018-01-01 DIAGNOSIS — R77.8 ELEVATED TROPONIN I LEVEL: ICD-10-CM

## 2018-01-01 DIAGNOSIS — C07 MALIGNANT TUMOR OF PAROTID GLAND (HCC): ICD-10-CM

## 2018-01-01 DIAGNOSIS — Z91.89 H/O ANXIETY STATE: ICD-10-CM

## 2018-01-01 DIAGNOSIS — T47.2X5A: Primary | ICD-10-CM

## 2018-01-01 DIAGNOSIS — R97.0 ELEVATED CARCINOEMBRYONIC ANTIGEN: ICD-10-CM

## 2018-01-01 DIAGNOSIS — J90 PLEURAL EFFUSION: ICD-10-CM

## 2018-01-01 DIAGNOSIS — J95.09 TRACHEOSTOMY OBSTRUCTION (HCC): Primary | ICD-10-CM

## 2018-01-01 DIAGNOSIS — Z93.0 TRACHEOSTOMY DEPENDENCE (HCC): Chronic | ICD-10-CM

## 2018-01-01 DIAGNOSIS — J31.2 CHRONIC PHARYNGITIS: ICD-10-CM

## 2018-01-01 DIAGNOSIS — R65.20 SEVERE SEPSIS (HCC): ICD-10-CM

## 2018-01-01 DIAGNOSIS — J18.9 COMMUNITY ACQUIRED PNEUMONIA OF RIGHT UPPER LOBE OF LUNG: Primary | ICD-10-CM

## 2018-01-01 DIAGNOSIS — C76.0 MALIGNANT NEOPLASM OF HEAD, FACE, AND NECK (HCC): ICD-10-CM

## 2018-01-01 DIAGNOSIS — G47.00 INSOMNIA, UNSPECIFIED TYPE: ICD-10-CM

## 2018-01-01 DIAGNOSIS — K59.00 CONSTIPATION, UNSPECIFIED CONSTIPATION TYPE: ICD-10-CM

## 2018-01-01 DIAGNOSIS — A41.9 SEVERE SEPSIS (HCC): ICD-10-CM

## 2018-01-01 DIAGNOSIS — R04.2 HEMOPTYSIS: ICD-10-CM

## 2018-01-01 DIAGNOSIS — J38.00 VOCAL CORD PARALYSIS: ICD-10-CM

## 2018-01-01 DIAGNOSIS — K59.00 CONSTIPATION, UNSPECIFIED CONSTIPATION TYPE: Chronic | ICD-10-CM

## 2018-01-01 DIAGNOSIS — C07 MALIGNANT NEOPLASM OF PAROTID GLAND (HCC): ICD-10-CM

## 2018-01-01 DIAGNOSIS — K21.9 GASTROESOPHAGEAL REFLUX DISEASE WITHOUT ESOPHAGITIS: Chronic | ICD-10-CM

## 2018-01-01 DIAGNOSIS — C32.9 LARYNGEAL CANCER (HCC): ICD-10-CM

## 2018-01-01 DIAGNOSIS — J34.89 NASAL CAVITY MASS: ICD-10-CM

## 2018-01-01 DIAGNOSIS — R40.4 TRANSIENT ALTERATION OF AWARENESS: Primary | ICD-10-CM

## 2018-01-01 DIAGNOSIS — J39.8 TRACHEAL OBSTRUCTION: ICD-10-CM

## 2018-01-01 DIAGNOSIS — R09.02 HYPOXIA: Primary | ICD-10-CM

## 2018-01-01 DIAGNOSIS — C07 PAROTID GLAND ADENOCARCINOMA (HCC): ICD-10-CM

## 2018-01-01 DIAGNOSIS — T85.598A FEEDING TUBE DYSFUNCTION, INITIAL ENCOUNTER: Primary | ICD-10-CM

## 2018-01-01 DIAGNOSIS — R52 PAIN: ICD-10-CM

## 2018-01-01 DIAGNOSIS — R09.02 HYPOXIA: ICD-10-CM

## 2018-01-01 DIAGNOSIS — K59.00 CONSTIPATION, UNSPECIFIED CONSTIPATION TYPE: Primary | ICD-10-CM

## 2018-01-01 LAB
ACID FAST STN SPEC: NEGATIVE
ALBUMIN SERPL-MCNC: 1.8 G/DL (ref 3.2–4.6)
ALBUMIN SERPL-MCNC: 2.8 G/DL (ref 3.2–4.6)
ALBUMIN SERPL-MCNC: 2.9 G/DL (ref 3.2–4.6)
ALBUMIN SERPL-MCNC: 2.9 G/DL (ref 3.2–4.6)
ALBUMIN SERPL-MCNC: 3 G/DL (ref 3.2–4.6)
ALBUMIN SERPL-MCNC: 3.1 G/DL (ref 3.2–4.6)
ALBUMIN SERPL-MCNC: 3.3 G/DL (ref 3.2–4.6)
ALBUMIN/GLOB SERPL: 0.4 {RATIO} (ref 1.2–3.5)
ALBUMIN/GLOB SERPL: 0.7 {RATIO} (ref 1.2–3.5)
ALBUMIN/GLOB SERPL: 0.8 {RATIO} (ref 1.2–3.5)
ALBUMIN/GLOB SERPL: 0.9 {RATIO} (ref 1.2–3.5)
ALP SERPL-CCNC: 116 U/L (ref 50–136)
ALP SERPL-CCNC: 120 U/L (ref 50–136)
ALP SERPL-CCNC: 124 U/L (ref 50–136)
ALP SERPL-CCNC: 145 U/L (ref 50–136)
ALP SERPL-CCNC: 148 U/L (ref 50–136)
ALP SERPL-CCNC: 148 U/L (ref 50–136)
ALP SERPL-CCNC: 156 U/L (ref 50–136)
ALP SERPL-CCNC: 164 U/L (ref 50–136)
ALP SERPL-CCNC: 176 U/L (ref 50–136)
ALP SERPL-CCNC: 186 U/L (ref 50–136)
ALP SERPL-CCNC: 260 U/L (ref 50–136)
ALT SERPL-CCNC: 182 U/L (ref 12–65)
ALT SERPL-CCNC: 33 U/L (ref 12–65)
ALT SERPL-CCNC: 34 U/L (ref 12–65)
ALT SERPL-CCNC: 41 U/L (ref 12–65)
ALT SERPL-CCNC: 44 U/L (ref 12–65)
ALT SERPL-CCNC: 44 U/L (ref 12–65)
ALT SERPL-CCNC: 45 U/L (ref 12–65)
ALT SERPL-CCNC: 47 U/L (ref 12–65)
ALT SERPL-CCNC: 75 U/L (ref 12–65)
ALT SERPL-CCNC: 79 U/L (ref 12–65)
ALT SERPL-CCNC: 94 U/L (ref 12–65)
AMMONIA PLAS-SCNC: 20 UMOL/L (ref 11–32)
ANION GAP SERPL CALC-SCNC: 10 MMOL/L (ref 7–16)
ANION GAP SERPL CALC-SCNC: 12 MMOL/L (ref 7–16)
ANION GAP SERPL CALC-SCNC: 14 MMOL/L (ref 7–16)
ANION GAP SERPL CALC-SCNC: 18 MMOL/L (ref 7–16)
ANION GAP SERPL CALC-SCNC: 7 MMOL/L (ref 7–16)
ANION GAP SERPL CALC-SCNC: 8 MMOL/L (ref 7–16)
ANION GAP SERPL CALC-SCNC: 9 MMOL/L (ref 7–16)
ANION GAP SERPL CALC-SCNC: 9 MMOL/L (ref 7–16)
APPEARANCE FLD: NORMAL
APPEARANCE UR: CLEAR
ARTERIAL PATENCY WRIST A: POSITIVE
AST SERPL-CCNC: 106 U/L (ref 15–37)
AST SERPL-CCNC: 18 U/L (ref 15–37)
AST SERPL-CCNC: 21 U/L (ref 15–37)
AST SERPL-CCNC: 22 U/L (ref 15–37)
AST SERPL-CCNC: 23 U/L (ref 15–37)
AST SERPL-CCNC: 24 U/L (ref 15–37)
AST SERPL-CCNC: 25 U/L (ref 15–37)
AST SERPL-CCNC: 26 U/L (ref 15–37)
AST SERPL-CCNC: 76 U/L (ref 15–37)
AST SERPL-CCNC: 95 U/L (ref 15–37)
AST SERPL-CCNC: 97 U/L (ref 15–37)
ATRIAL RATE: 100 BPM
ATRIAL RATE: 107 BPM
ATRIAL RATE: 115 BPM
ATRIAL RATE: 135 BPM
ATRIAL RATE: 81 BPM
BACTERIA SPEC CULT: ABNORMAL
BACTERIA SPEC CULT: ABNORMAL
BACTERIA SPEC CULT: NORMAL
BACTERIA URNS QL MICRO: 0 /HPF
BASE EXCESS BLDA CALC-SCNC: 3.1 MMOL/L (ref 0–3)
BASOPHILS # BLD: 0 K/UL (ref 0–0.2)
BASOPHILS NFR BLD: 0 % (ref 0–2)
BDY SITE: ABNORMAL
BILIRUB SERPL-MCNC: 0.1 MG/DL (ref 0.2–1.1)
BILIRUB SERPL-MCNC: 0.2 MG/DL (ref 0.2–1.1)
BILIRUB SERPL-MCNC: 0.3 MG/DL (ref 0.2–1.1)
BILIRUB SERPL-MCNC: 0.4 MG/DL (ref 0.2–1.1)
BILIRUB SERPL-MCNC: 0.6 MG/DL (ref 0.2–1.1)
BILIRUB SERPL-MCNC: 0.6 MG/DL (ref 0.2–1.1)
BILIRUB UR QL: NEGATIVE
BNP SERPL-MCNC: 31 PG/ML
BUN SERPL-MCNC: 13 MG/DL (ref 8–23)
BUN SERPL-MCNC: 15 MG/DL (ref 8–23)
BUN SERPL-MCNC: 16 MG/DL (ref 8–23)
BUN SERPL-MCNC: 18 MG/DL (ref 8–23)
BUN SERPL-MCNC: 19 MG/DL (ref 8–23)
BUN SERPL-MCNC: 20 MG/DL (ref 8–23)
BUN SERPL-MCNC: 21 MG/DL (ref 8–23)
BUN SERPL-MCNC: 24 MG/DL (ref 8–23)
BUN SERPL-MCNC: 26 MG/DL (ref 8–23)
BUN SERPL-MCNC: 38 MG/DL (ref 8–23)
CALCIUM SERPL-MCNC: 8.1 MG/DL (ref 8.3–10.4)
CALCIUM SERPL-MCNC: 8.2 MG/DL (ref 8.3–10.4)
CALCIUM SERPL-MCNC: 8.2 MG/DL (ref 8.3–10.4)
CALCIUM SERPL-MCNC: 8.3 MG/DL (ref 8.3–10.4)
CALCIUM SERPL-MCNC: 8.3 MG/DL (ref 8.3–10.4)
CALCIUM SERPL-MCNC: 8.4 MG/DL (ref 8.3–10.4)
CALCIUM SERPL-MCNC: 8.4 MG/DL (ref 8.3–10.4)
CALCIUM SERPL-MCNC: 8.5 MG/DL (ref 8.3–10.4)
CALCIUM SERPL-MCNC: 8.6 MG/DL (ref 8.3–10.4)
CALCIUM SERPL-MCNC: 8.8 MG/DL (ref 8.3–10.4)
CALCIUM SERPL-MCNC: 9.2 MG/DL (ref 8.3–10.4)
CALCULATED P AXIS, ECG09: 59 DEGREES
CALCULATED P AXIS, ECG09: 76 DEGREES
CALCULATED P AXIS, ECG09: 82 DEGREES
CALCULATED P AXIS, ECG09: 82 DEGREES
CALCULATED P AXIS, ECG09: 83 DEGREES
CALCULATED R AXIS, ECG10: -12 DEGREES
CALCULATED R AXIS, ECG10: 17 DEGREES
CALCULATED R AXIS, ECG10: 27 DEGREES
CALCULATED R AXIS, ECG10: 6 DEGREES
CALCULATED R AXIS, ECG10: 8 DEGREES
CALCULATED T AXIS, ECG11: 43 DEGREES
CALCULATED T AXIS, ECG11: 46 DEGREES
CALCULATED T AXIS, ECG11: 51 DEGREES
CALCULATED T AXIS, ECG11: 57 DEGREES
CALCULATED T AXIS, ECG11: 72 DEGREES
CASTS URNS QL MICRO: 0 /LPF
CEA SERPL-MCNC: 24.6 NG/ML (ref 0–3)
CEA SERPL-MCNC: 25.8 NG/ML (ref 0–3)
CEA SERPL-MCNC: 26 NG/ML (ref 0–3)
CHLORIDE SERPL-SCNC: 100 MMOL/L (ref 98–107)
CHLORIDE SERPL-SCNC: 100 MMOL/L (ref 98–107)
CHLORIDE SERPL-SCNC: 101 MMOL/L (ref 98–107)
CHLORIDE SERPL-SCNC: 102 MMOL/L (ref 98–107)
CHLORIDE SERPL-SCNC: 102 MMOL/L (ref 98–107)
CHLORIDE SERPL-SCNC: 103 MMOL/L (ref 98–107)
CHLORIDE SERPL-SCNC: 103 MMOL/L (ref 98–107)
CHLORIDE SERPL-SCNC: 105 MMOL/L (ref 98–107)
CHLORIDE SERPL-SCNC: 105 MMOL/L (ref 98–107)
CHLORIDE SERPL-SCNC: 98 MMOL/L (ref 98–107)
CHLORIDE SERPL-SCNC: 98 MMOL/L (ref 98–107)
CHLORIDE SERPL-SCNC: 99 MMOL/L (ref 98–107)
CK MB CFR SERPL CALC: 1.6 %
CK MB SERPL-MCNC: 24.4 NG/ML (ref 0.5–3.6)
CK SERPL-CCNC: 1521 U/L (ref 21–215)
CO2 SERPL-SCNC: 17 MMOL/L (ref 21–32)
CO2 SERPL-SCNC: 26 MMOL/L (ref 21–32)
CO2 SERPL-SCNC: 26 MMOL/L (ref 21–32)
CO2 SERPL-SCNC: 27 MMOL/L (ref 21–32)
CO2 SERPL-SCNC: 27 MMOL/L (ref 21–32)
CO2 SERPL-SCNC: 28 MMOL/L (ref 21–32)
CO2 SERPL-SCNC: 28 MMOL/L (ref 21–32)
CO2 SERPL-SCNC: 29 MMOL/L (ref 21–32)
CO2 SERPL-SCNC: 31 MMOL/L (ref 21–32)
COHGB MFR BLD: 0.6 % (ref 0.5–1.5)
COLOR FLD: YELLOW
COLOR UR: YELLOW
CREAT SERPL-MCNC: 0.41 MG/DL (ref 0.8–1.5)
CREAT SERPL-MCNC: 0.57 MG/DL (ref 0.8–1.5)
CREAT SERPL-MCNC: 0.59 MG/DL (ref 0.8–1.5)
CREAT SERPL-MCNC: 0.62 MG/DL (ref 0.8–1.5)
CREAT SERPL-MCNC: 0.62 MG/DL (ref 0.8–1.5)
CREAT SERPL-MCNC: 0.64 MG/DL (ref 0.8–1.5)
CREAT SERPL-MCNC: 0.66 MG/DL (ref 0.8–1.5)
CREAT SERPL-MCNC: 0.68 MG/DL (ref 0.8–1.5)
CREAT SERPL-MCNC: 0.73 MG/DL (ref 0.8–1.5)
CREAT SERPL-MCNC: 0.75 MG/DL (ref 0.8–1.5)
CREAT SERPL-MCNC: 0.81 MG/DL (ref 0.8–1.5)
CREAT SERPL-MCNC: 0.86 MG/DL (ref 0.8–1.5)
CREAT SERPL-MCNC: 0.98 MG/DL (ref 0.8–1.5)
CREAT SERPL-MCNC: 1.1 MG/DL (ref 0.8–1.5)
DIAGNOSIS, 93000: NORMAL
DIFFERENTIAL METHOD BLD: ABNORMAL
DO-HGB BLD-MCNC: 6 % (ref 0–5)
EOSINOPHIL # BLD: 0 K/UL (ref 0–0.8)
EOSINOPHIL NFR BLD: 0 % (ref 0.5–7.8)
EPI CELLS #/AREA URNS HPF: NORMAL /HPF
ERYTHROCYTE [DISTWIDTH] IN BLOOD BY AUTOMATED COUNT: 14.4 % (ref 11.9–14.6)
ERYTHROCYTE [DISTWIDTH] IN BLOOD BY AUTOMATED COUNT: 14.6 % (ref 11.9–14.6)
ERYTHROCYTE [DISTWIDTH] IN BLOOD BY AUTOMATED COUNT: 14.8 % (ref 11.9–14.6)
ERYTHROCYTE [DISTWIDTH] IN BLOOD BY AUTOMATED COUNT: 14.9 % (ref 11.9–14.6)
ERYTHROCYTE [DISTWIDTH] IN BLOOD BY AUTOMATED COUNT: 15 % (ref 11.9–14.6)
ERYTHROCYTE [DISTWIDTH] IN BLOOD BY AUTOMATED COUNT: 15 % (ref 11.9–14.6)
ERYTHROCYTE [DISTWIDTH] IN BLOOD BY AUTOMATED COUNT: 15.2 % (ref 11.9–14.6)
ERYTHROCYTE [DISTWIDTH] IN BLOOD BY AUTOMATED COUNT: 15.3 % (ref 11.9–14.6)
ERYTHROCYTE [DISTWIDTH] IN BLOOD BY AUTOMATED COUNT: 15.7 % (ref 11.9–14.6)
ERYTHROCYTE [DISTWIDTH] IN BLOOD BY AUTOMATED COUNT: 15.8 % (ref 11.9–14.6)
ERYTHROCYTE [DISTWIDTH] IN BLOOD BY AUTOMATED COUNT: 16.1 % (ref 11.9–14.6)
ERYTHROCYTE [DISTWIDTH] IN BLOOD BY AUTOMATED COUNT: 16.1 % (ref 11.9–14.6)
ERYTHROCYTE [DISTWIDTH] IN BLOOD BY AUTOMATED COUNT: 16.2 % (ref 11.9–14.6)
ERYTHROCYTE [DISTWIDTH] IN BLOOD BY AUTOMATED COUNT: 16.3 % (ref 11.9–14.6)
FIO2 ON VENT: 21 %
FLUAV AG NPH QL IA: NEGATIVE
FLUBV AG NPH QL IA: NEGATIVE
FLUID COMMENTS, FCOM: NORMAL
FUNGUS CULTURE, RFCO2T: NORMAL
FUNGUS SMEAR, RFCO1T: NORMAL
FUNGUS SPEC CULT: NORMAL
FUNGUS STAIN, 188244: NORMAL
GLOBULIN SER CALC-MCNC: 3.4 G/DL (ref 2.3–3.5)
GLOBULIN SER CALC-MCNC: 3.5 G/DL (ref 2.3–3.5)
GLOBULIN SER CALC-MCNC: 3.6 G/DL (ref 2.3–3.5)
GLOBULIN SER CALC-MCNC: 3.7 G/DL (ref 2.3–3.5)
GLOBULIN SER CALC-MCNC: 4.1 G/DL (ref 2.3–3.5)
GLOBULIN SER CALC-MCNC: 4.3 G/DL (ref 2.3–3.5)
GLOBULIN SER CALC-MCNC: 4.5 G/DL (ref 2.3–3.5)
GLUCOSE FLD-MCNC: 160 MG/DL
GLUCOSE SERPL-MCNC: 112 MG/DL (ref 65–100)
GLUCOSE SERPL-MCNC: 112 MG/DL (ref 65–100)
GLUCOSE SERPL-MCNC: 137 MG/DL (ref 65–100)
GLUCOSE SERPL-MCNC: 145 MG/DL (ref 65–100)
GLUCOSE SERPL-MCNC: 146 MG/DL (ref 65–100)
GLUCOSE SERPL-MCNC: 148 MG/DL (ref 65–100)
GLUCOSE SERPL-MCNC: 152 MG/DL (ref 65–100)
GLUCOSE SERPL-MCNC: 154 MG/DL (ref 65–100)
GLUCOSE SERPL-MCNC: 157 MG/DL (ref 65–100)
GLUCOSE SERPL-MCNC: 168 MG/DL (ref 65–100)
GLUCOSE SERPL-MCNC: 180 MG/DL (ref 65–100)
GLUCOSE SERPL-MCNC: 276 MG/DL (ref 65–100)
GLUCOSE SERPL-MCNC: 92 MG/DL (ref 65–100)
GLUCOSE SERPL-MCNC: 94 MG/DL (ref 65–100)
GLUCOSE UR STRIP.AUTO-MCNC: NEGATIVE MG/DL
GRAM STN SPEC: ABNORMAL
GRAM STN SPEC: NORMAL
GRAM STN SPEC: NORMAL
HCO3 BLDA-SCNC: 26 MMOL/L (ref 22–26)
HCT VFR BLD AUTO: 35.5 % (ref 41.1–50.3)
HCT VFR BLD AUTO: 36.9 % (ref 41.1–50.3)
HCT VFR BLD AUTO: 37.6 % (ref 41.1–50.3)
HCT VFR BLD AUTO: 37.9 % (ref 41.1–50.3)
HCT VFR BLD AUTO: 38.5 % (ref 41.1–50.3)
HCT VFR BLD AUTO: 39 % (ref 41.1–50.3)
HCT VFR BLD AUTO: 39.3 % (ref 41.1–50.3)
HCT VFR BLD AUTO: 40 % (ref 41.1–50.3)
HCT VFR BLD AUTO: 41.1 % (ref 41.1–50.3)
HCT VFR BLD AUTO: 41.9 % (ref 41.1–50.3)
HCT VFR BLD AUTO: 42.8 % (ref 41.1–50.3)
HCT VFR BLD AUTO: 43.1 % (ref 41.1–50.3)
HCT VFR BLD AUTO: 44.8 % (ref 41.1–50.3)
HCT VFR BLD AUTO: 45.4 % (ref 41.1–50.3)
HGB BLD-MCNC: 11.5 G/DL (ref 13.6–17.2)
HGB BLD-MCNC: 11.6 G/DL (ref 13.6–17.2)
HGB BLD-MCNC: 12.5 G/DL (ref 13.6–17.2)
HGB BLD-MCNC: 12.5 G/DL (ref 13.6–17.2)
HGB BLD-MCNC: 12.6 G/DL (ref 13.6–17.2)
HGB BLD-MCNC: 12.8 G/DL (ref 13.6–17.2)
HGB BLD-MCNC: 13 G/DL (ref 13.6–17.2)
HGB BLD-MCNC: 13.3 G/DL (ref 13.6–17.2)
HGB BLD-MCNC: 13.4 G/DL (ref 13.6–17.2)
HGB BLD-MCNC: 13.9 G/DL (ref 13.6–17.2)
HGB BLD-MCNC: 14 G/DL (ref 13.6–17.2)
HGB BLD-MCNC: 14.2 G/DL (ref 13.6–17.2)
HGB BLD-MCNC: 14.7 G/DL (ref 13.6–17.2)
HGB BLD-MCNC: 14.9 G/DL (ref 13.6–17.2)
HGB BLDMV-MCNC: 14.7 GM/DL (ref 11.7–15)
HGB UR QL STRIP: NEGATIVE
IMM GRANULOCYTES # BLD: 0 K/UL (ref 0–0.5)
IMM GRANULOCYTES # BLD: 0 K/UL (ref 0–0.5)
IMM GRANULOCYTES # BLD: 0.1 K/UL (ref 0–0.5)
IMM GRANULOCYTES # BLD: 0.2 K/UL (ref 0–0.5)
IMM GRANULOCYTES NFR BLD AUTO: 0 % (ref 0–5)
IMM GRANULOCYTES NFR BLD AUTO: 0 % (ref 0–5)
IMM GRANULOCYTES NFR BLD AUTO: 1 % (ref 0–5)
KETONES UR QL STRIP.AUTO: NEGATIVE MG/DL
LACTATE BLD-SCNC: 2.1 MMOL/L (ref 0.5–1.9)
LACTATE BLD-SCNC: 2.1 MMOL/L (ref 0.5–1.9)
LACTATE BLD-SCNC: 2.4 MMOL/L (ref 0.5–1.9)
LACTATE BLD-SCNC: 4.1 MMOL/L (ref 0.5–1.9)
LDH FLD L TO P-CCNC: 99 U/L
LEUKOCYTE ESTERASE UR QL STRIP.AUTO: NEGATIVE
LYMPHOCYTES # BLD: 0.3 K/UL (ref 0.5–4.6)
LYMPHOCYTES # BLD: 0.4 K/UL (ref 0.5–4.6)
LYMPHOCYTES # BLD: 0.5 K/UL (ref 0.5–4.6)
LYMPHOCYTES # BLD: 0.5 K/UL (ref 0.5–4.6)
LYMPHOCYTES # BLD: 0.6 K/UL (ref 0.5–4.6)
LYMPHOCYTES # BLD: 0.6 K/UL (ref 0.5–4.6)
LYMPHOCYTES # BLD: 0.7 K/UL (ref 0.5–4.6)
LYMPHOCYTES # BLD: 0.7 K/UL (ref 0.5–4.6)
LYMPHOCYTES # BLD: 1 K/UL (ref 0.5–4.6)
LYMPHOCYTES # BLD: 1 K/UL (ref 0.5–4.6)
LYMPHOCYTES # BLD: 2.2 K/UL (ref 0.5–4.6)
LYMPHOCYTES NFR BLD: 12 % (ref 13–44)
LYMPHOCYTES NFR BLD: 20 % (ref 13–44)
LYMPHOCYTES NFR BLD: 4 % (ref 13–44)
LYMPHOCYTES NFR BLD: 5 % (ref 13–44)
LYMPHOCYTES NFR BLD: 5 % (ref 13–44)
LYMPHOCYTES NFR BLD: 7 % (ref 13–44)
LYMPHOCYTES NFR BLD: 7 % (ref 13–44)
LYMPHOCYTES NFR BLD: 8 % (ref 13–44)
LYMPHOCYTES NFR BLD: 9 % (ref 13–44)
LYMPHOCYTES NFR FLD: 19 %
MAGNESIUM SERPL-MCNC: 1.6 MG/DL (ref 1.8–2.4)
MAGNESIUM SERPL-MCNC: 1.7 MG/DL (ref 1.8–2.4)
MAGNESIUM SERPL-MCNC: 1.7 MG/DL (ref 1.8–2.4)
MCH RBC QN AUTO: 27.8 PG (ref 26.1–32.9)
MCH RBC QN AUTO: 28.2 PG (ref 26.1–32.9)
MCH RBC QN AUTO: 28.5 PG (ref 26.1–32.9)
MCH RBC QN AUTO: 28.5 PG (ref 26.1–32.9)
MCH RBC QN AUTO: 28.6 PG (ref 26.1–32.9)
MCH RBC QN AUTO: 28.7 PG (ref 26.1–32.9)
MCH RBC QN AUTO: 28.7 PG (ref 26.1–32.9)
MCH RBC QN AUTO: 28.9 PG (ref 26.1–32.9)
MCH RBC QN AUTO: 29.1 PG (ref 26.1–32.9)
MCH RBC QN AUTO: 29.2 PG (ref 26.1–32.9)
MCH RBC QN AUTO: 29.3 PG (ref 26.1–32.9)
MCH RBC QN AUTO: 29.4 PG (ref 26.1–32.9)
MCH RBC QN AUTO: 29.6 PG (ref 26.1–32.9)
MCH RBC QN AUTO: 29.6 PG (ref 26.1–32.9)
MCHC RBC AUTO-ENTMCNC: 31.4 G/DL (ref 31.4–35)
MCHC RBC AUTO-ENTMCNC: 32.1 G/DL (ref 31.4–35)
MCHC RBC AUTO-ENTMCNC: 32.4 G/DL (ref 31.4–35)
MCHC RBC AUTO-ENTMCNC: 32.4 G/DL (ref 31.4–35)
MCHC RBC AUTO-ENTMCNC: 32.6 G/DL (ref 31.4–35)
MCHC RBC AUTO-ENTMCNC: 32.7 G/DL (ref 31.4–35)
MCHC RBC AUTO-ENTMCNC: 32.9 G/DL (ref 31.4–35)
MCHC RBC AUTO-ENTMCNC: 33 G/DL (ref 31.4–35)
MCHC RBC AUTO-ENTMCNC: 33.2 G/DL (ref 31.4–35)
MCHC RBC AUTO-ENTMCNC: 33.3 G/DL (ref 31.4–35)
MCV RBC AUTO: 85.6 FL (ref 79.6–97.8)
MCV RBC AUTO: 86.1 FL (ref 79.6–97.8)
MCV RBC AUTO: 86.9 FL (ref 79.6–97.8)
MCV RBC AUTO: 87.7 FL (ref 79.6–97.8)
MCV RBC AUTO: 87.9 FL (ref 79.6–97.8)
MCV RBC AUTO: 87.9 FL (ref 79.6–97.8)
MCV RBC AUTO: 88.2 FL (ref 79.6–97.8)
MCV RBC AUTO: 88.5 FL (ref 79.6–97.8)
MCV RBC AUTO: 88.8 FL (ref 79.6–97.8)
MCV RBC AUTO: 88.8 FL (ref 79.6–97.8)
MCV RBC AUTO: 88.9 FL (ref 79.6–97.8)
MCV RBC AUTO: 88.9 FL (ref 79.6–97.8)
MCV RBC AUTO: 89.1 FL (ref 79.6–97.8)
MCV RBC AUTO: 89.7 FL (ref 79.6–97.8)
METHGB MFR BLD: 0.4 % (ref 0–1.5)
MONOCYTES # BLD: 0.2 K/UL (ref 0.1–1.3)
MONOCYTES # BLD: 0.2 K/UL (ref 0.1–1.3)
MONOCYTES # BLD: 0.3 K/UL (ref 0.1–1.3)
MONOCYTES # BLD: 0.4 K/UL (ref 0.1–1.3)
MONOCYTES # BLD: 0.5 K/UL (ref 0.1–1.3)
MONOCYTES # BLD: 0.5 K/UL (ref 0.1–1.3)
MONOCYTES # BLD: 0.6 K/UL (ref 0.1–1.3)
MONOCYTES # BLD: 0.6 K/UL (ref 0.1–1.3)
MONOCYTES NFR BLD: 2 % (ref 4–12)
MONOCYTES NFR BLD: 3 % (ref 4–12)
MONOCYTES NFR BLD: 4 % (ref 4–12)
MONOCYTES NFR BLD: 5 % (ref 4–12)
MONOCYTES NFR BLD: 7 % (ref 4–12)
MONOCYTES NFR BLD: 8 % (ref 4–12)
MONOCYTES NFR BLD: 8 % (ref 4–12)
MONOS+MACROS NFR FLD: 1 %
MYCOBACTERIUM SPEC QL CULT: NEGATIVE
NEUTROPHILS NFR FLD: 80 %
NEUTS SEG # BLD: 11 K/UL (ref 1.7–8.2)
NEUTS SEG # BLD: 4.6 K/UL (ref 1.7–8.2)
NEUTS SEG # BLD: 5.7 K/UL (ref 1.7–8.2)
NEUTS SEG # BLD: 6.3 K/UL (ref 1.7–8.2)
NEUTS SEG # BLD: 6.5 K/UL (ref 1.7–8.2)
NEUTS SEG # BLD: 6.7 K/UL (ref 1.7–8.2)
NEUTS SEG # BLD: 6.9 K/UL (ref 1.7–8.2)
NEUTS SEG # BLD: 7.5 K/UL (ref 1.7–8.2)
NEUTS SEG # BLD: 7.7 K/UL (ref 1.7–8.2)
NEUTS SEG # BLD: 8.4 K/UL (ref 1.7–8.2)
NEUTS SEG # BLD: 8.9 K/UL (ref 1.7–8.2)
NEUTS SEG # BLD: 9.5 K/UL (ref 1.7–8.2)
NEUTS SEG # BLD: 9.6 K/UL (ref 1.7–8.2)
NEUTS SEG NFR BLD: 75 % (ref 43–78)
NEUTS SEG NFR BLD: 79 % (ref 43–78)
NEUTS SEG NFR BLD: 84 % (ref 43–78)
NEUTS SEG NFR BLD: 84 % (ref 43–78)
NEUTS SEG NFR BLD: 87 % (ref 43–78)
NEUTS SEG NFR BLD: 87 % (ref 43–78)
NEUTS SEG NFR BLD: 88 % (ref 43–78)
NEUTS SEG NFR BLD: 89 % (ref 43–78)
NEUTS SEG NFR BLD: 90 % (ref 43–78)
NEUTS SEG NFR BLD: 91 % (ref 43–78)
NEUTS SEG NFR BLD: 92 % (ref 43–78)
NITRITE UR QL STRIP.AUTO: NEGATIVE
NRBC # BLD: 0 K/UL (ref 0–0.2)
NRBC BLD-RTO: 0 PER 100 WBC (ref 0–2)
NUC CELL # FLD: 233 /CU MM
OXYHGB MFR BLDA: 93.2 % (ref 94–97)
P-R INTERVAL, ECG05: 106 MS
P-R INTERVAL, ECG05: 112 MS
P-R INTERVAL, ECG05: 128 MS
P-R INTERVAL, ECG05: 128 MS
P-R INTERVAL, ECG05: 130 MS
PCO2 BLDA: 34 MMHG (ref 35–45)
PH BLDA: 7.5 [PH] (ref 7.35–7.45)
PH UR STRIP: 6.5 [PH] (ref 5–9)
PHOSPHATE SERPL-MCNC: 1.9 MG/DL (ref 2.3–3.7)
PLATELET # BLD AUTO: 257 K/UL (ref 150–450)
PLATELET # BLD AUTO: 262 K/UL (ref 150–450)
PLATELET # BLD AUTO: 266 K/UL (ref 150–450)
PLATELET # BLD AUTO: 276 K/UL (ref 150–450)
PLATELET # BLD AUTO: 276 K/UL (ref 150–450)
PLATELET # BLD AUTO: 288 K/UL (ref 150–450)
PLATELET # BLD AUTO: 294 K/UL (ref 150–450)
PLATELET # BLD AUTO: 303 K/UL (ref 150–450)
PLATELET # BLD AUTO: 318 K/UL (ref 150–450)
PLATELET # BLD AUTO: 343 K/UL (ref 150–450)
PLATELET # BLD AUTO: 343 K/UL (ref 150–450)
PLATELET # BLD AUTO: 354 K/UL (ref 150–450)
PLATELET # BLD AUTO: 362 K/UL (ref 150–450)
PLATELET # BLD AUTO: 403 K/UL (ref 150–450)
PMV BLD AUTO: 10 FL (ref 10.8–14.1)
PMV BLD AUTO: 10.3 FL (ref 10.8–14.1)
PMV BLD AUTO: 8.8 FL (ref 10.8–14.1)
PMV BLD AUTO: 9 FL (ref 10.8–14.1)
PMV BLD AUTO: 9.1 FL (ref 10.8–14.1)
PMV BLD AUTO: 9.2 FL (ref 10.8–14.1)
PMV BLD AUTO: 9.2 FL (ref 10.8–14.1)
PMV BLD AUTO: 9.3 FL (ref 10.8–14.1)
PMV BLD AUTO: 9.4 FL (ref 10.8–14.1)
PMV BLD AUTO: 9.6 FL (ref 10.8–14.1)
PMV BLD AUTO: 9.8 FL (ref 10.8–14.1)
PMV BLD AUTO: 9.9 FL (ref 10.8–14.1)
PO2 BLDA: 69 MMHG (ref 80–105)
POTASSIUM SERPL-SCNC: 3.4 MMOL/L (ref 3.5–5.1)
POTASSIUM SERPL-SCNC: 3.7 MMOL/L (ref 3.5–5.1)
POTASSIUM SERPL-SCNC: 3.8 MMOL/L (ref 3.5–5.1)
POTASSIUM SERPL-SCNC: 3.9 MMOL/L (ref 3.5–5.1)
POTASSIUM SERPL-SCNC: 4 MMOL/L (ref 3.5–5.1)
POTASSIUM SERPL-SCNC: 4.1 MMOL/L (ref 3.5–5.1)
POTASSIUM SERPL-SCNC: 4.2 MMOL/L (ref 3.5–5.1)
POTASSIUM SERPL-SCNC: 4.3 MMOL/L (ref 3.5–5.1)
POTASSIUM SERPL-SCNC: 4.5 MMOL/L (ref 3.5–5.1)
POTASSIUM SERPL-SCNC: 4.6 MMOL/L (ref 3.5–5.1)
PROCALCITONIN SERPL-MCNC: 0.3 NG/ML
PROCALCITONIN SERPL-MCNC: <0.1 NG/ML
PROT FLD-MCNC: 1.8 G/DL
PROT SERPL-MCNC: 6.2 G/DL (ref 6.3–8.2)
PROT SERPL-MCNC: 6.3 G/DL (ref 6.3–8.2)
PROT SERPL-MCNC: 6.4 G/DL (ref 6.3–8.2)
PROT SERPL-MCNC: 6.4 G/DL (ref 6.3–8.2)
PROT SERPL-MCNC: 6.5 G/DL (ref 6.3–8.2)
PROT SERPL-MCNC: 6.7 G/DL (ref 6.3–8.2)
PROT SERPL-MCNC: 7.3 G/DL (ref 6.3–8.2)
PROT SERPL-MCNC: 7.4 G/DL (ref 6.3–8.2)
PROT UR STRIP-MCNC: NEGATIVE MG/DL
Q-T INTERVAL, ECG07: 274 MS
Q-T INTERVAL, ECG07: 296 MS
Q-T INTERVAL, ECG07: 308 MS
Q-T INTERVAL, ECG07: 324 MS
Q-T INTERVAL, ECG07: 336 MS
QRS DURATION, ECG06: 64 MS
QRS DURATION, ECG06: 64 MS
QRS DURATION, ECG06: 66 MS
QRS DURATION, ECG06: 66 MS
QRS DURATION, ECG06: 70 MS
QTC CALCULATION (BEZET), ECG08: 390 MS
QTC CALCULATION (BEZET), ECG08: 409 MS
QTC CALCULATION (BEZET), ECG08: 411 MS
QTC CALCULATION (BEZET), ECG08: 411 MS
QTC CALCULATION (BEZET), ECG08: 417 MS
RBC # BLD AUTO: 4.04 M/UL (ref 4.23–5.67)
RBC # BLD AUTO: 4.17 M/UL (ref 4.23–5.67)
RBC # BLD AUTO: 4.22 M/UL (ref 4.23–5.67)
RBC # BLD AUTO: 4.33 M/UL (ref 4.23–5.67)
RBC # BLD AUTO: 4.42 M/UL (ref 4.23–5.67)
RBC # BLD AUTO: 4.43 M/UL (ref 4.23–5.67)
RBC # BLD AUTO: 4.53 M/UL (ref 4.23–5.67)
RBC # BLD AUTO: 4.55 M/UL (ref 4.23–5.67)
RBC # BLD AUTO: 4.63 M/UL (ref 4.23–5.67)
RBC # BLD AUTO: 4.72 M/UL (ref 4.23–5.67)
RBC # BLD AUTO: 4.88 M/UL (ref 4.23–5.67)
RBC # BLD AUTO: 4.96 M/UL (ref 4.23–5.67)
RBC # BLD AUTO: 5.06 M/UL (ref 4.23–5.67)
RBC # BLD AUTO: 5.08 M/UL (ref 4.23–5.67)
RBC # FLD: NORMAL /CU MM
RBC #/AREA URNS HPF: NORMAL /HPF
REFLEX TO ID, RFCO3T: NORMAL
SAO2 % BLD: 94 % (ref 92–98.5)
SERVICE CMNT-IMP: ABNORMAL
SERVICE CMNT-IMP: NORMAL
SODIUM SERPL-SCNC: 135 MMOL/L (ref 136–145)
SODIUM SERPL-SCNC: 135 MMOL/L (ref 136–145)
SODIUM SERPL-SCNC: 136 MMOL/L (ref 136–145)
SODIUM SERPL-SCNC: 137 MMOL/L (ref 136–145)
SODIUM SERPL-SCNC: 139 MMOL/L (ref 136–145)
SODIUM SERPL-SCNC: 142 MMOL/L (ref 136–145)
SODIUM SERPL-SCNC: 142 MMOL/L (ref 136–145)
SP GR UR REFRACTOMETRY: 1.02 (ref 1–1.02)
SPECIMEN PREPARATION: NORMAL
SPECIMEN SOURCE FLD: NORMAL
SPECIMEN SOURCE: NORMAL
T3 SERPL-MCNC: 0.45 NG/ML (ref 0.6–1.81)
T4 FREE SERPL-MCNC: 1.1 NG/DL (ref 0.78–1.46)
TROPONIN I BLD-MCNC: 0.04 NG/ML (ref 0.02–0.05)
TROPONIN I SERPL-MCNC: 0.14 NG/ML (ref 0.02–0.05)
TROPONIN I SERPL-MCNC: 0.2 NG/ML (ref 0.02–0.05)
TROPONIN I SERPL-MCNC: 0.31 NG/ML (ref 0.02–0.05)
TROPONIN I SERPL-MCNC: 0.42 NG/ML (ref 0.02–0.05)
TROPONIN I SERPL-MCNC: 0.46 NG/ML (ref 0.02–0.05)
TSH SERPL DL<=0.005 MIU/L-ACNC: 7.32 UIU/ML (ref 0.36–3.74)
UROBILINOGEN UR QL STRIP.AUTO: 0.2 EU/DL (ref 0.2–1)
VENTILATION MODE VENT: ABNORMAL
VENTRICULAR RATE, ECG03: 100 BPM
VENTRICULAR RATE, ECG03: 107 BPM
VENTRICULAR RATE, ECG03: 115 BPM
VENTRICULAR RATE, ECG03: 135 BPM
VENTRICULAR RATE, ECG03: 81 BPM
WBC # BLD AUTO: 10.3 K/UL (ref 4.3–11.1)
WBC # BLD AUTO: 10.3 K/UL (ref 4.3–11.1)
WBC # BLD AUTO: 10.4 K/UL (ref 4.3–11.1)
WBC # BLD AUTO: 11.1 K/UL (ref 4.3–11.1)
WBC # BLD AUTO: 12.3 K/UL (ref 4.3–11.1)
WBC # BLD AUTO: 5.4 K/UL (ref 4.3–11.1)
WBC # BLD AUTO: 6.8 K/UL (ref 4.3–11.1)
WBC # BLD AUTO: 7.2 K/UL (ref 4.3–11.1)
WBC # BLD AUTO: 7.4 K/UL (ref 4.3–11.1)
WBC # BLD AUTO: 7.5 K/UL (ref 4.3–11.1)
WBC # BLD AUTO: 7.5 K/UL (ref 4.3–11.1)
WBC # BLD AUTO: 8.1 K/UL (ref 4.3–11.1)
WBC # BLD AUTO: 8.5 K/UL (ref 4.3–11.1)
WBC # BLD AUTO: 8.8 K/UL (ref 4.3–11.1)
WBC URNS QL MICRO: NORMAL /HPF

## 2018-01-01 PROCEDURE — 80053 COMPREHEN METABOLIC PANEL: CPT | Performed by: INTERNAL MEDICINE

## 2018-01-01 PROCEDURE — 94760 N-INVAS EAR/PLS OXIMETRY 1: CPT

## 2018-01-01 PROCEDURE — 31720 CLEARANCE OF AIRWAYS: CPT

## 2018-01-01 PROCEDURE — 80048 BASIC METABOLIC PNL TOTAL CA: CPT | Performed by: EMERGENCY MEDICINE

## 2018-01-01 PROCEDURE — 74011250637 HC RX REV CODE- 250/637: Performed by: INTERNAL MEDICINE

## 2018-01-01 PROCEDURE — 74011000258 HC RX REV CODE- 258: Performed by: INTERNAL MEDICINE

## 2018-01-01 PROCEDURE — 36415 COLL VENOUS BLD VENIPUNCTURE: CPT | Performed by: INTERNAL MEDICINE

## 2018-01-01 PROCEDURE — G0156 HHCP-SVS OF AIDE,EA 15 MIN: HCPCS

## 2018-01-01 PROCEDURE — 99223 1ST HOSP IP/OBS HIGH 75: CPT | Performed by: INTERNAL MEDICINE

## 2018-01-01 PROCEDURE — 84484 ASSAY OF TROPONIN QUANT: CPT | Performed by: EMERGENCY MEDICINE

## 2018-01-01 PROCEDURE — 74011636637 HC RX REV CODE- 636/637: Performed by: INTERNAL MEDICINE

## 2018-01-01 PROCEDURE — G0299 HHS/HOSPICE OF RN EA 15 MIN: HCPCS

## 2018-01-01 PROCEDURE — 93005 ELECTROCARDIOGRAM TRACING: CPT | Performed by: EMERGENCY MEDICINE

## 2018-01-01 PROCEDURE — 80053 COMPREHEN METABOLIC PANEL: CPT | Performed by: EMERGENCY MEDICINE

## 2018-01-01 PROCEDURE — 99285 EMERGENCY DEPT VISIT HI MDM: CPT | Performed by: EMERGENCY MEDICINE

## 2018-01-01 PROCEDURE — 96415 CHEMO IV INFUSION ADDL HR: CPT

## 2018-01-01 PROCEDURE — 94640 AIRWAY INHALATION TREATMENT: CPT

## 2018-01-01 PROCEDURE — 71045 X-RAY EXAM CHEST 1 VIEW: CPT

## 2018-01-01 PROCEDURE — 83735 ASSAY OF MAGNESIUM: CPT | Performed by: INTERNAL MEDICINE

## 2018-01-01 PROCEDURE — 85025 COMPLETE CBC W/AUTO DIFF WBC: CPT | Performed by: INTERNAL MEDICINE

## 2018-01-01 PROCEDURE — 65270000029 HC RM PRIVATE

## 2018-01-01 PROCEDURE — 74011250637 HC RX REV CODE- 250/637: Performed by: NURSE PRACTITIONER

## 2018-01-01 PROCEDURE — HOSPICE MEDICATION HC HH HOSPICE MEDICATION

## 2018-01-01 PROCEDURE — A6212 FOAM DRG <=16 SQ IN W/BORDER: HCPCS

## 2018-01-01 PROCEDURE — 74011636320 HC RX REV CODE- 636/320: Performed by: EMERGENCY MEDICINE

## 2018-01-01 PROCEDURE — 82378 CARCINOEMBRYONIC ANTIGEN: CPT | Performed by: INTERNAL MEDICINE

## 2018-01-01 PROCEDURE — 74011250636 HC RX REV CODE- 250/636: Performed by: INTERNAL MEDICINE

## 2018-01-01 PROCEDURE — 77010033711 HC HIGH FLOW OXYGEN

## 2018-01-01 PROCEDURE — 77010033678 HC OXYGEN DAILY

## 2018-01-01 PROCEDURE — 84480 ASSAY TRIIODOTHYRONINE (T3): CPT | Performed by: INTERNAL MEDICINE

## 2018-01-01 PROCEDURE — 81003 URINALYSIS AUTO W/O SCOPE: CPT | Performed by: EMERGENCY MEDICINE

## 2018-01-01 PROCEDURE — 0651 HSPC ROUTINE HOME CARE

## 2018-01-01 PROCEDURE — 83605 ASSAY OF LACTIC ACID: CPT

## 2018-01-01 PROCEDURE — 71046 X-RAY EXAM CHEST 2 VIEWS: CPT

## 2018-01-01 PROCEDURE — 87070 CULTURE OTHR SPECIMN AEROBIC: CPT | Performed by: EMERGENCY MEDICINE

## 2018-01-01 PROCEDURE — 74011000250 HC RX REV CODE- 250: Performed by: INTERNAL MEDICINE

## 2018-01-01 PROCEDURE — 96413 CHEMO IV INFUSION 1 HR: CPT

## 2018-01-01 PROCEDURE — 84439 ASSAY OF FREE THYROXINE: CPT | Performed by: INTERNAL MEDICINE

## 2018-01-01 PROCEDURE — 96374 THER/PROPH/DIAG INJ IV PUSH: CPT | Performed by: EMERGENCY MEDICINE

## 2018-01-01 PROCEDURE — 70491 CT SOFT TISSUE NECK W/DYE: CPT

## 2018-01-01 PROCEDURE — 74022 RADEX COMPL AQT ABD SERIES: CPT

## 2018-01-01 PROCEDURE — 99283 EMERGENCY DEPT VISIT LOW MDM: CPT | Performed by: EMERGENCY MEDICINE

## 2018-01-01 PROCEDURE — 74011000258 HC RX REV CODE- 258: Performed by: EMERGENCY MEDICINE

## 2018-01-01 PROCEDURE — A9270 NON-COVERED ITEM OR SERVICE: HCPCS

## 2018-01-01 PROCEDURE — 76040000019: Performed by: INTERNAL MEDICINE

## 2018-01-01 PROCEDURE — 74011250636 HC RX REV CODE- 250/636

## 2018-01-01 PROCEDURE — T4541 LARGE DISPOSABLE UNDERPAD: HCPCS

## 2018-01-01 PROCEDURE — 75810000165 HC THORACENTESIS: Performed by: EMERGENCY MEDICINE

## 2018-01-01 PROCEDURE — A6250 SKIN SEAL PROTECT MOISTURIZR: HCPCS

## 2018-01-01 PROCEDURE — 84145 PROCALCITONIN (PCT): CPT | Performed by: EMERGENCY MEDICINE

## 2018-01-01 PROCEDURE — 82803 BLOOD GASES ANY COMBINATION: CPT

## 2018-01-01 PROCEDURE — 96360 HYDRATION IV INFUSION INIT: CPT | Performed by: EMERGENCY MEDICINE

## 2018-01-01 PROCEDURE — 85025 COMPLETE CBC W/AUTO DIFF WBC: CPT | Performed by: EMERGENCY MEDICINE

## 2018-01-01 PROCEDURE — 77030005103 HC CATH GASTMY BLN HALY -B

## 2018-01-01 PROCEDURE — 74018 RADEX ABDOMEN 1 VIEW: CPT

## 2018-01-01 PROCEDURE — 87205 SMEAR GRAM STAIN: CPT | Performed by: EMERGENCY MEDICINE

## 2018-01-01 PROCEDURE — C1729 CATH, DRAINAGE: HCPCS | Performed by: EMERGENCY MEDICINE

## 2018-01-01 PROCEDURE — G0155 HHCP-SVS OF CSW,EA 15 MIN: HCPCS

## 2018-01-01 PROCEDURE — 85027 COMPLETE CBC AUTOMATED: CPT | Performed by: EMERGENCY MEDICINE

## 2018-01-01 PROCEDURE — 74011636320 HC RX REV CODE- 636/320: Performed by: STUDENT IN AN ORGANIZED HEALTH CARE EDUCATION/TRAINING PROGRAM

## 2018-01-01 PROCEDURE — 80048 BASIC METABOLIC PNL TOTAL CA: CPT | Performed by: INTERNAL MEDICINE

## 2018-01-01 PROCEDURE — 96361 HYDRATE IV INFUSION ADD-ON: CPT | Performed by: EMERGENCY MEDICINE

## 2018-01-01 PROCEDURE — 99232 SBSQ HOSP IP/OBS MODERATE 35: CPT | Performed by: INTERNAL MEDICINE

## 2018-01-01 PROCEDURE — 96375 TX/PRO/DX INJ NEW DRUG ADDON: CPT

## 2018-01-01 PROCEDURE — A6260 WOUND CLEANSER ANY TYPE/SIZE: HCPCS

## 2018-01-01 PROCEDURE — 84100 ASSAY OF PHOSPHORUS: CPT | Performed by: INTERNAL MEDICINE

## 2018-01-01 PROCEDURE — 77030006998

## 2018-01-01 PROCEDURE — 74011250636 HC RX REV CODE- 250/636: Performed by: EMERGENCY MEDICINE

## 2018-01-01 PROCEDURE — 84157 ASSAY OF PROTEIN OTHER: CPT | Performed by: EMERGENCY MEDICINE

## 2018-01-01 PROCEDURE — 77030018846 HC SOL IRR STRL H20 ICUM -A

## 2018-01-01 PROCEDURE — 74011250637 HC RX REV CODE- 250/637: Performed by: EMERGENCY MEDICINE

## 2018-01-01 PROCEDURE — T4522 ADULT SIZE BRIEF/DIAPER MED: HCPCS

## 2018-01-01 PROCEDURE — A6216 NON-STERILE GAUZE<=16 SQ IN: HCPCS

## 2018-01-01 PROCEDURE — 99284 EMERGENCY DEPT VISIT MOD MDM: CPT | Performed by: INTERNAL MEDICINE

## 2018-01-01 PROCEDURE — 74011000258 HC RX REV CODE- 258: Performed by: STUDENT IN AN ORGANIZED HEALTH CARE EDUCATION/TRAINING PROGRAM

## 2018-01-01 PROCEDURE — 83615 LACTATE (LD) (LDH) ENZYME: CPT | Performed by: EMERGENCY MEDICINE

## 2018-01-01 PROCEDURE — 96361 HYDRATE IV INFUSION ADD-ON: CPT

## 2018-01-01 PROCEDURE — 75810000109 HC GASTRO TUBE CHANGE: Performed by: EMERGENCY MEDICINE

## 2018-01-01 PROCEDURE — 77030018798 HC PMP KT ENTRL FED COVD -A

## 2018-01-01 PROCEDURE — 83880 ASSAY OF NATRIURETIC PEPTIDE: CPT | Performed by: EMERGENCY MEDICINE

## 2018-01-01 PROCEDURE — 97161 PT EVAL LOW COMPLEX 20 MIN: CPT

## 2018-01-01 PROCEDURE — 88112 CYTOPATH CELL ENHANCE TECH: CPT | Performed by: INTERNAL MEDICINE

## 2018-01-01 PROCEDURE — 81015 MICROSCOPIC EXAM OF URINE: CPT | Performed by: EMERGENCY MEDICINE

## 2018-01-01 PROCEDURE — 36600 WITHDRAWAL OF ARTERIAL BLOOD: CPT

## 2018-01-01 PROCEDURE — 82945 GLUCOSE OTHER FLUID: CPT | Performed by: EMERGENCY MEDICINE

## 2018-01-01 PROCEDURE — 77030021668 HC NEB PREFIL KT VYRM -A

## 2018-01-01 PROCEDURE — 96365 THER/PROPH/DIAG IV INF INIT: CPT | Performed by: EMERGENCY MEDICINE

## 2018-01-01 PROCEDURE — 87077 CULTURE AEROBIC IDENTIFY: CPT | Performed by: EMERGENCY MEDICINE

## 2018-01-01 PROCEDURE — 97530 THERAPEUTIC ACTIVITIES: CPT

## 2018-01-01 PROCEDURE — 99239 HOSP IP/OBS DSCHRG MGMT >30: CPT | Performed by: INTERNAL MEDICINE

## 2018-01-01 PROCEDURE — 87804 INFLUENZA ASSAY W/OPTIC: CPT | Performed by: EMERGENCY MEDICINE

## 2018-01-01 PROCEDURE — 32555 ASPIRATE PLEURA W/ IMAGING: CPT | Performed by: INTERNAL MEDICINE

## 2018-01-01 PROCEDURE — 87040 BLOOD CULTURE FOR BACTERIA: CPT | Performed by: EMERGENCY MEDICINE

## 2018-01-01 PROCEDURE — 82140 ASSAY OF AMMONIA: CPT | Performed by: EMERGENCY MEDICINE

## 2018-01-01 PROCEDURE — 87102 FUNGUS ISOLATION CULTURE: CPT | Performed by: EMERGENCY MEDICINE

## 2018-01-01 PROCEDURE — 87186 SC STD MICRODIL/AGAR DIL: CPT | Performed by: EMERGENCY MEDICINE

## 2018-01-01 PROCEDURE — 77030018836 HC SOL IRR NACL ICUM -A

## 2018-01-01 PROCEDURE — A4927 NON-STERILE GLOVES: HCPCS

## 2018-01-01 PROCEDURE — 99285 EMERGENCY DEPT VISIT HI MDM: CPT

## 2018-01-01 PROCEDURE — 84484 ASSAY OF TROPONIN QUANT: CPT | Performed by: STUDENT IN AN ORGANIZED HEALTH CARE EDUCATION/TRAINING PROGRAM

## 2018-01-01 PROCEDURE — 96367 TX/PROPH/DG ADDL SEQ IV INF: CPT

## 2018-01-01 PROCEDURE — 89050 BODY FLUID CELL COUNT: CPT | Performed by: EMERGENCY MEDICINE

## 2018-01-01 PROCEDURE — 93306 TTE W/DOPPLER COMPLETE: CPT

## 2018-01-01 PROCEDURE — 82550 ASSAY OF CK (CPK): CPT | Performed by: STUDENT IN AN ORGANIZED HEALTH CARE EDUCATION/TRAINING PROGRAM

## 2018-01-01 PROCEDURE — 88305 TISSUE EXAM BY PATHOLOGIST: CPT | Performed by: INTERNAL MEDICINE

## 2018-01-01 PROCEDURE — 70450 CT HEAD/BRAIN W/O DYE: CPT

## 2018-01-01 PROCEDURE — 99343 PR HOME VST NEW PATIENT MOD-HI SEVERITY 45 MINUTES: CPT | Performed by: INTERNAL MEDICINE

## 2018-01-01 PROCEDURE — 84443 ASSAY THYROID STIM HORMONE: CPT | Performed by: EMERGENCY MEDICINE

## 2018-01-01 PROCEDURE — 3336500001 HSPC ELECTION

## 2018-01-01 PROCEDURE — T4523 ADULT SIZE BRIEF/DIAPER LG: HCPCS

## 2018-01-01 PROCEDURE — 97110 THERAPEUTIC EXERCISES: CPT

## 2018-01-01 PROCEDURE — 71260 CT THORAX DX C+: CPT

## 2018-01-01 PROCEDURE — 99284 EMERGENCY DEPT VISIT MOD MDM: CPT | Performed by: EMERGENCY MEDICINE

## 2018-01-01 PROCEDURE — 74011636320 HC RX REV CODE- 636/320: Performed by: INTERNAL MEDICINE

## 2018-01-01 PROCEDURE — 87116 MYCOBACTERIA CULTURE: CPT | Performed by: EMERGENCY MEDICINE

## 2018-01-01 PROCEDURE — C1729 CATH, DRAINAGE: HCPCS | Performed by: INTERNAL MEDICINE

## 2018-01-01 RX ORDER — SODIUM CHLORIDE 0.9 % (FLUSH) 0.9 %
5-10 SYRINGE (ML) INJECTION AS NEEDED
Status: DISCONTINUED | OUTPATIENT
Start: 2018-01-01 | End: 2018-01-01 | Stop reason: HOSPADM

## 2018-01-01 RX ORDER — HYDROCODONE BITARTRATE AND ACETAMINOPHEN 10; 325 MG/1; MG/1
2 TABLET ORAL
Status: DISCONTINUED | OUTPATIENT
Start: 2018-01-01 | End: 2018-01-01 | Stop reason: HOSPADM

## 2018-01-01 RX ORDER — SODIUM CHLORIDE 0.9 % (FLUSH) 0.9 %
10 SYRINGE (ML) INJECTION
Status: COMPLETED | OUTPATIENT
Start: 2018-01-01 | End: 2018-01-01

## 2018-01-01 RX ORDER — DIPHENHYDRAMINE HYDROCHLORIDE 50 MG/ML
50 INJECTION, SOLUTION INTRAMUSCULAR; INTRAVENOUS ONCE
Status: COMPLETED | OUTPATIENT
Start: 2018-01-01 | End: 2018-01-01

## 2018-01-01 RX ORDER — SODIUM CHLORIDE 0.9 % (FLUSH) 0.9 %
10 SYRINGE (ML) INJECTION AS NEEDED
Status: ACTIVE | OUTPATIENT
Start: 2018-01-01 | End: 2018-01-01

## 2018-01-01 RX ORDER — PREDNISONE 20 MG/1
40 TABLET ORAL
Status: DISCONTINUED | OUTPATIENT
Start: 2018-01-01 | End: 2018-01-01

## 2018-01-01 RX ORDER — DEXAMETHASONE SODIUM PHOSPHATE 100 MG/10ML
10 INJECTION INTRAMUSCULAR; INTRAVENOUS ONCE
Status: COMPLETED | OUTPATIENT
Start: 2018-01-01 | End: 2018-01-01

## 2018-01-01 RX ORDER — SERTRALINE HYDROCHLORIDE 50 MG/1
50 TABLET, FILM COATED ORAL DAILY
Status: DISCONTINUED | OUTPATIENT
Start: 2018-01-01 | End: 2018-01-01 | Stop reason: HOSPADM

## 2018-01-01 RX ORDER — BUDESONIDE 0.5 MG/2ML
500 INHALANT ORAL
Status: DISCONTINUED | OUTPATIENT
Start: 2018-01-01 | End: 2018-01-01 | Stop reason: HOSPADM

## 2018-01-01 RX ORDER — DEXTROSE MONOHYDRATE AND SODIUM CHLORIDE 5; .9 G/100ML; G/100ML
100 INJECTION, SOLUTION INTRAVENOUS CONTINUOUS
Status: DISCONTINUED | OUTPATIENT
Start: 2018-01-01 | End: 2018-01-01

## 2018-01-01 RX ORDER — PANTOPRAZOLE SODIUM 40 MG/1
40 TABLET, DELAYED RELEASE ORAL
Status: DISCONTINUED | OUTPATIENT
Start: 2018-01-01 | End: 2018-01-01 | Stop reason: HOSPADM

## 2018-01-01 RX ORDER — AZITHROMYCIN 250 MG/1
500 TABLET, FILM COATED ORAL EVERY 24 HOURS
Status: DISCONTINUED | OUTPATIENT
Start: 2018-01-01 | End: 2018-01-01

## 2018-01-01 RX ORDER — LEVOFLOXACIN 750 MG/1
750 TABLET ORAL EVERY 24 HOURS
Qty: 14 TAB | Refills: 0 | Status: SHIPPED | OUTPATIENT
Start: 2018-01-01 | End: 2018-01-01

## 2018-01-01 RX ORDER — LEVOFLOXACIN 5 MG/ML
750 INJECTION, SOLUTION INTRAVENOUS EVERY 24 HOURS
Status: DISCONTINUED | OUTPATIENT
Start: 2018-01-01 | End: 2018-01-01

## 2018-01-01 RX ORDER — MAGNESIUM SULFATE HEPTAHYDRATE 40 MG/ML
2 INJECTION, SOLUTION INTRAVENOUS ONCE
Status: COMPLETED | OUTPATIENT
Start: 2018-01-01 | End: 2018-01-01

## 2018-01-01 RX ORDER — METOPROLOL TARTRATE 25 MG/1
25 TABLET, FILM COATED ORAL 2 TIMES DAILY
Qty: 60 TAB | Refills: 11 | Status: SHIPPED | OUTPATIENT
Start: 2018-01-01 | End: 2018-01-01

## 2018-01-01 RX ORDER — MIRTAZAPINE 15 MG/1
15 TABLET, FILM COATED ORAL
Status: DISCONTINUED | OUTPATIENT
Start: 2018-01-01 | End: 2018-01-01 | Stop reason: HOSPADM

## 2018-01-01 RX ORDER — DOCUSATE SODIUM 50 MG/5ML
100 LIQUID ORAL 2 TIMES DAILY
Status: DISCONTINUED | OUTPATIENT
Start: 2018-01-01 | End: 2018-01-01 | Stop reason: HOSPADM

## 2018-01-01 RX ORDER — GABAPENTIN 100 MG/1
100 CAPSULE ORAL 3 TIMES DAILY
Qty: 90 CAP | Refills: 3 | Status: SHIPPED | OUTPATIENT
Start: 2018-01-01 | End: 2018-01-01

## 2018-01-01 RX ORDER — PREDNISONE 10 MG/1
TABLET ORAL
Qty: 25 TAB | Refills: 0 | Status: SHIPPED | OUTPATIENT
Start: 2018-01-01 | End: 2018-01-01

## 2018-01-01 RX ORDER — VANCOMYCIN HYDROCHLORIDE
1250 ONCE
Status: COMPLETED | OUTPATIENT
Start: 2018-01-01 | End: 2018-01-01

## 2018-01-01 RX ORDER — HEPARIN 100 UNIT/ML
300-500 SYRINGE INTRAVENOUS AS NEEDED
Status: DISPENSED | OUTPATIENT
Start: 2018-01-01 | End: 2018-01-01

## 2018-01-01 RX ORDER — FENTANYL 25 UG/1
1 PATCH TRANSDERMAL
Status: DISCONTINUED | OUTPATIENT
Start: 2018-01-01 | End: 2018-01-01 | Stop reason: HOSPADM

## 2018-01-01 RX ORDER — GUAIFENESIN 100 MG/5ML
81 LIQUID (ML) ORAL DAILY
Status: DISCONTINUED | OUTPATIENT
Start: 2018-01-01 | End: 2018-01-01 | Stop reason: HOSPADM

## 2018-01-01 RX ORDER — POLYETHYLENE GLYCOL 3350 17 G/17G
17 POWDER, FOR SOLUTION ORAL DAILY
Qty: 30 PACKET | Refills: 11 | Status: SHIPPED | OUTPATIENT
Start: 2018-01-01 | End: 2018-01-01

## 2018-01-01 RX ORDER — DEXAMETHASONE SODIUM PHOSPHATE 100 MG/10ML
10 INJECTION INTRAMUSCULAR; INTRAVENOUS
Status: COMPLETED | OUTPATIENT
Start: 2018-01-01 | End: 2018-01-01

## 2018-01-01 RX ORDER — MORPHINE SULFATE 4 MG/ML
2 INJECTION INTRAVENOUS
Status: DISCONTINUED | OUTPATIENT
Start: 2018-01-01 | End: 2018-01-01 | Stop reason: HOSPADM

## 2018-01-01 RX ORDER — ENOXAPARIN SODIUM 100 MG/ML
40 INJECTION SUBCUTANEOUS EVERY 24 HOURS
Status: DISCONTINUED | OUTPATIENT
Start: 2018-01-01 | End: 2018-01-01 | Stop reason: HOSPADM

## 2018-01-01 RX ORDER — SODIUM CHLORIDE 0.9 % (FLUSH) 0.9 %
5-10 SYRINGE (ML) INJECTION EVERY 8 HOURS
Status: DISCONTINUED | OUTPATIENT
Start: 2018-01-01 | End: 2018-01-01 | Stop reason: HOSPADM

## 2018-01-01 RX ORDER — CLONAZEPAM 1 MG/1
0.5 TABLET ORAL 2 TIMES DAILY
Status: DISCONTINUED | OUTPATIENT
Start: 2018-01-01 | End: 2018-01-01 | Stop reason: HOSPADM

## 2018-01-01 RX ORDER — GUAIFENESIN 100 MG/5ML
81 LIQUID (ML) ORAL DAILY
Qty: 30 TAB | Refills: 11 | Status: SHIPPED | OUTPATIENT
Start: 2018-01-01 | End: 2018-01-01

## 2018-01-01 RX ORDER — POLYETHYLENE GLYCOL 3350 17 G/17G
17 POWDER, FOR SOLUTION ORAL DAILY
Status: DISCONTINUED | OUTPATIENT
Start: 2018-01-01 | End: 2018-01-01 | Stop reason: HOSPADM

## 2018-01-01 RX ORDER — METOPROLOL TARTRATE 25 MG/1
25 TABLET, FILM COATED ORAL 2 TIMES DAILY
Status: DISCONTINUED | OUTPATIENT
Start: 2018-01-01 | End: 2018-01-01 | Stop reason: HOSPADM

## 2018-01-01 RX ORDER — MAGNESIUM CITRATE
296 SOLUTION, ORAL ORAL ONCE
Qty: 1 BOTTLE | Refills: 0 | Status: SHIPPED | OUTPATIENT
Start: 2018-01-01 | End: 2018-01-01

## 2018-01-01 RX ORDER — GABAPENTIN 100 MG/1
100 CAPSULE ORAL 3 TIMES DAILY
Status: DISCONTINUED | OUTPATIENT
Start: 2018-01-01 | End: 2018-01-01 | Stop reason: HOSPADM

## 2018-01-01 RX ORDER — FENTANYL 50 UG/1
1 PATCH TRANSDERMAL
Status: DISCONTINUED | OUTPATIENT
Start: 2018-01-01 | End: 2018-01-01

## 2018-01-01 RX ORDER — SODIUM CHLORIDE 9 MG/ML
1000 INJECTION, SOLUTION INTRAVENOUS ONCE
Status: COMPLETED | OUTPATIENT
Start: 2018-01-01 | End: 2018-01-01

## 2018-01-01 RX ORDER — ONDANSETRON 2 MG/ML
8 INJECTION INTRAMUSCULAR; INTRAVENOUS ONCE
Status: COMPLETED | OUTPATIENT
Start: 2018-01-01 | End: 2018-01-01

## 2018-01-01 RX ORDER — HEPARIN 100 UNIT/ML
300-500 SYRINGE INTRAVENOUS AS NEEDED
Status: ACTIVE | OUTPATIENT
Start: 2018-01-01 | End: 2018-01-01

## 2018-01-01 RX ADMIN — Medication 5 ML: at 22:30

## 2018-01-01 RX ADMIN — METOPROLOL TARTRATE 25 MG: 25 TABLET, FILM COATED ORAL at 22:40

## 2018-01-01 RX ADMIN — METHYLPREDNISOLONE SODIUM SUCCINATE 40 MG: 40 INJECTION, POWDER, FOR SOLUTION INTRAMUSCULAR; INTRAVENOUS at 01:57

## 2018-01-01 RX ADMIN — METOPROLOL TARTRATE 25 MG: 25 TABLET, FILM COATED ORAL at 09:28

## 2018-01-01 RX ADMIN — Medication 10 ML: at 14:59

## 2018-01-01 RX ADMIN — BUDESONIDE 500 MCG: 0.5 INHALANT RESPIRATORY (INHALATION) at 09:27

## 2018-01-01 RX ADMIN — HYDROCODONE BITARTRATE AND ACETAMINOPHEN 2 TABLET: 10; 325 TABLET ORAL at 17:42

## 2018-01-01 RX ADMIN — GABAPENTIN 100 MG: 100 CAPSULE ORAL at 08:40

## 2018-01-01 RX ADMIN — SERTRALINE HYDROCHLORIDE 50 MG: 50 TABLET ORAL at 09:29

## 2018-01-01 RX ADMIN — DOCUSATE SODIUM 100 MG: 50 LIQUID ORAL at 09:21

## 2018-01-01 RX ADMIN — BUDESONIDE 500 MCG: 0.5 INHALANT RESPIRATORY (INHALATION) at 08:20

## 2018-01-01 RX ADMIN — Medication 10 ML: at 22:18

## 2018-01-01 RX ADMIN — DEXTROSE MONOHYDRATE AND SODIUM CHLORIDE 100 ML/HR: 5; .9 INJECTION, SOLUTION INTRAVENOUS at 03:44

## 2018-01-01 RX ADMIN — GABAPENTIN 100 MG: 100 CAPSULE ORAL at 22:32

## 2018-01-01 RX ADMIN — GABAPENTIN 100 MG: 100 CAPSULE ORAL at 16:15

## 2018-01-01 RX ADMIN — Medication 10 ML: at 06:06

## 2018-01-01 RX ADMIN — DOCUSATE SODIUM 100 MG: 50 LIQUID ORAL at 22:29

## 2018-01-01 RX ADMIN — METHYLPREDNISOLONE SODIUM SUCCINATE 40 MG: 40 INJECTION, POWDER, FOR SOLUTION INTRAMUSCULAR; INTRAVENOUS at 16:10

## 2018-01-01 RX ADMIN — CLONAZEPAM 0.5 MG: 1 TABLET ORAL at 09:22

## 2018-01-01 RX ADMIN — MIRTAZAPINE 15 MG: 15 TABLET, FILM COATED ORAL at 21:21

## 2018-01-01 RX ADMIN — Medication 10 ML: at 05:32

## 2018-01-01 RX ADMIN — PANTOPRAZOLE SODIUM 40 MG: 40 TABLET, DELAYED RELEASE ORAL at 05:34

## 2018-01-01 RX ADMIN — METHYLPREDNISOLONE SODIUM SUCCINATE 40 MG: 40 INJECTION, POWDER, FOR SOLUTION INTRAMUSCULAR; INTRAVENOUS at 21:21

## 2018-01-01 RX ADMIN — METHYLPREDNISOLONE SODIUM SUCCINATE 40 MG: 40 INJECTION, POWDER, FOR SOLUTION INTRAMUSCULAR; INTRAVENOUS at 05:42

## 2018-01-01 RX ADMIN — CLONAZEPAM 0.5 MG: 1 TABLET ORAL at 21:54

## 2018-01-01 RX ADMIN — Medication 10 ML: at 14:47

## 2018-01-01 RX ADMIN — SODIUM CHLORIDE 1000 ML: 900 INJECTION, SOLUTION INTRAVENOUS at 16:36

## 2018-01-01 RX ADMIN — Medication 10 ML: at 05:33

## 2018-01-01 RX ADMIN — CLONAZEPAM 0.5 MG: 1 TABLET ORAL at 09:17

## 2018-01-01 RX ADMIN — GABAPENTIN 100 MG: 100 CAPSULE ORAL at 15:12

## 2018-01-01 RX ADMIN — CLONAZEPAM 0.5 MG: 1 TABLET ORAL at 22:17

## 2018-01-01 RX ADMIN — BUDESONIDE 500 MCG: 0.5 INHALANT RESPIRATORY (INHALATION) at 08:29

## 2018-01-01 RX ADMIN — DIPHENHYDRAMINE HYDROCHLORIDE 50 MG: 50 INJECTION, SOLUTION INTRAMUSCULAR; INTRAVENOUS at 11:20

## 2018-01-01 RX ADMIN — CLONAZEPAM 0.5 MG: 1 TABLET ORAL at 08:11

## 2018-01-01 RX ADMIN — DIPHENHYDRAMINE HYDROCHLORIDE 50 MG: 50 INJECTION, SOLUTION INTRAMUSCULAR; INTRAVENOUS at 12:15

## 2018-01-01 RX ADMIN — DEXTROSE MONOHYDRATE AND SODIUM CHLORIDE 100 ML/HR: 5; .9 INJECTION, SOLUTION INTRAVENOUS at 05:59

## 2018-01-01 RX ADMIN — ENOXAPARIN SODIUM 40 MG: 40 INJECTION SUBCUTANEOUS at 09:04

## 2018-01-01 RX ADMIN — SODIUM CHLORIDE 1000 ML: 900 INJECTION, SOLUTION INTRAVENOUS at 16:05

## 2018-01-01 RX ADMIN — DEXTROSE MONOHYDRATE AND SODIUM CHLORIDE 100 ML/HR: 5; .9 INJECTION, SOLUTION INTRAVENOUS at 16:09

## 2018-01-01 RX ADMIN — SODIUM CHLORIDE 500 ML: 900 INJECTION, SOLUTION INTRAVENOUS at 11:00

## 2018-01-01 RX ADMIN — METHYLPREDNISOLONE SODIUM SUCCINATE 40 MG: 40 INJECTION, POWDER, FOR SOLUTION INTRAMUSCULAR; INTRAVENOUS at 22:16

## 2018-01-01 RX ADMIN — SERTRALINE HYDROCHLORIDE 50 MG: 50 TABLET ORAL at 09:27

## 2018-01-01 RX ADMIN — Medication 5 ML: at 21:22

## 2018-01-01 RX ADMIN — HYDROCODONE BITARTRATE AND ACETAMINOPHEN 2 TABLET: 10; 325 TABLET ORAL at 01:15

## 2018-01-01 RX ADMIN — BUDESONIDE 500 MCG: 0.5 INHALANT RESPIRATORY (INHALATION) at 21:19

## 2018-01-01 RX ADMIN — LEVOTHYROXINE SODIUM 137 MCG: 50 TABLET ORAL at 05:44

## 2018-01-01 RX ADMIN — MORPHINE SULFATE 2 MG: 4 INJECTION INTRAVENOUS at 14:44

## 2018-01-01 RX ADMIN — METHYLPREDNISOLONE SODIUM SUCCINATE 40 MG: 40 INJECTION, POWDER, FOR SOLUTION INTRAMUSCULAR; INTRAVENOUS at 22:17

## 2018-01-01 RX ADMIN — Medication 10 ML: at 13:01

## 2018-01-01 RX ADMIN — Medication 10 ML: at 23:45

## 2018-01-01 RX ADMIN — SODIUM CHLORIDE 500 ML: 900 INJECTION, SOLUTION INTRAVENOUS at 14:40

## 2018-01-01 RX ADMIN — BUDESONIDE 500 MCG: 0.5 INHALANT RESPIRATORY (INHALATION) at 19:10

## 2018-01-01 RX ADMIN — LEVOFLOXACIN 750 MG: 500 TABLET, FILM COATED ORAL at 14:38

## 2018-01-01 RX ADMIN — LACTULOSE 20 G: 20 SOLUTION ORAL at 14:02

## 2018-01-01 RX ADMIN — SERTRALINE HYDROCHLORIDE 50 MG: 50 TABLET ORAL at 09:17

## 2018-01-01 RX ADMIN — CLONAZEPAM 0.5 MG: 1 TABLET ORAL at 22:02

## 2018-01-01 RX ADMIN — DOCUSATE SODIUM 100 MG: 50 LIQUID ORAL at 16:43

## 2018-01-01 RX ADMIN — METOPROLOL TARTRATE 25 MG: 25 TABLET, FILM COATED ORAL at 20:56

## 2018-01-01 RX ADMIN — Medication 10 ML: at 22:24

## 2018-01-01 RX ADMIN — BUDESONIDE 500 MCG: 0.5 INHALANT RESPIRATORY (INHALATION) at 09:18

## 2018-01-01 RX ADMIN — CLONAZEPAM 0.5 MG: 1 TABLET ORAL at 08:40

## 2018-01-01 RX ADMIN — Medication 5 ML: at 14:39

## 2018-01-01 RX ADMIN — GABAPENTIN 100 MG: 100 CAPSULE ORAL at 17:24

## 2018-01-01 RX ADMIN — Medication 5 ML: at 16:09

## 2018-01-01 RX ADMIN — SERTRALINE HYDROCHLORIDE 50 MG: 50 TABLET ORAL at 09:22

## 2018-01-01 RX ADMIN — HYDROCODONE BITARTRATE AND ACETAMINOPHEN 2 TABLET: 10; 325 TABLET ORAL at 12:19

## 2018-01-01 RX ADMIN — IOPAMIDOL 100 ML: 755 INJECTION, SOLUTION INTRAVENOUS at 14:59

## 2018-01-01 RX ADMIN — GABAPENTIN 100 MG: 100 CAPSULE ORAL at 22:17

## 2018-01-01 RX ADMIN — LEVOFLOXACIN 750 MG: 500 TABLET, FILM COATED ORAL at 12:49

## 2018-01-01 RX ADMIN — METOPROLOL TARTRATE 25 MG: 25 TABLET, FILM COATED ORAL at 08:10

## 2018-01-01 RX ADMIN — LEVOFLOXACIN 750 MG: 500 TABLET, FILM COATED ORAL at 16:43

## 2018-01-01 RX ADMIN — SODIUM CHLORIDE 100 ML: 900 INJECTION, SOLUTION INTRAVENOUS at 14:59

## 2018-01-01 RX ADMIN — METHYLPREDNISOLONE SODIUM SUCCINATE 40 MG: 40 INJECTION, POWDER, FOR SOLUTION INTRAMUSCULAR; INTRAVENOUS at 09:18

## 2018-01-01 RX ADMIN — METOPROLOL TARTRATE 25 MG: 25 TABLET, FILM COATED ORAL at 21:55

## 2018-01-01 RX ADMIN — CLONAZEPAM 0.5 MG: 1 TABLET ORAL at 22:40

## 2018-01-01 RX ADMIN — ENOXAPARIN SODIUM 40 MG: 40 INJECTION SUBCUTANEOUS at 10:29

## 2018-01-01 RX ADMIN — PANTOPRAZOLE SODIUM 40 MG: 40 TABLET, DELAYED RELEASE ORAL at 05:55

## 2018-01-01 RX ADMIN — AZITHROMYCIN 500 MG: 250 TABLET, FILM COATED ORAL at 21:20

## 2018-01-01 RX ADMIN — METOPROLOL TARTRATE 25 MG: 25 TABLET, FILM COATED ORAL at 22:32

## 2018-01-01 RX ADMIN — ASPIRIN 81 MG 81 MG: 81 TABLET ORAL at 09:22

## 2018-01-01 RX ADMIN — BUDESONIDE 500 MCG: 0.5 INHALANT RESPIRATORY (INHALATION) at 20:56

## 2018-01-01 RX ADMIN — HYDROCODONE BITARTRATE AND ACETAMINOPHEN 2 TABLET: 10; 325 TABLET ORAL at 22:10

## 2018-01-01 RX ADMIN — LEVOTHYROXINE SODIUM 137 MCG: 50 TABLET ORAL at 05:32

## 2018-01-01 RX ADMIN — ASPIRIN 81 MG 81 MG: 81 TABLET ORAL at 09:30

## 2018-01-01 RX ADMIN — HYDROCODONE BITARTRATE AND ACETAMINOPHEN 2 TABLET: 10; 325 TABLET ORAL at 12:27

## 2018-01-01 RX ADMIN — DEXAMETHASONE SODIUM PHOSPHATE 10 MG: 10 INJECTION INTRAMUSCULAR; INTRAVENOUS at 17:35

## 2018-01-01 RX ADMIN — HYDROCODONE BITARTRATE AND ACETAMINOPHEN 2 TABLET: 10; 325 TABLET ORAL at 22:16

## 2018-01-01 RX ADMIN — MIRTAZAPINE 15 MG: 15 TABLET, FILM COATED ORAL at 22:30

## 2018-01-01 RX ADMIN — DIATRIZOATE MEGLUMINE AND DIATRIZOATE SODIUM 30 ML: 660; 100 LIQUID ORAL; RECTAL at 16:39

## 2018-01-01 RX ADMIN — SODIUM CHLORIDE 500 ML: 900 INJECTION, SOLUTION INTRAVENOUS at 12:16

## 2018-01-01 RX ADMIN — MIRTAZAPINE 15 MG: 15 TABLET, FILM COATED ORAL at 22:01

## 2018-01-01 RX ADMIN — CLONAZEPAM 0.5 MG: 1 TABLET ORAL at 20:55

## 2018-01-01 RX ADMIN — IOPAMIDOL 80 ML: 755 INJECTION, SOLUTION INTRAVENOUS at 18:15

## 2018-01-01 RX ADMIN — GABAPENTIN 100 MG: 100 CAPSULE ORAL at 21:19

## 2018-01-01 RX ADMIN — HYDROCODONE BITARTRATE AND ACETAMINOPHEN 2 TABLET: 10; 325 TABLET ORAL at 09:06

## 2018-01-01 RX ADMIN — CETUXIMAB 430 MG: 2 SOLUTION INTRAVENOUS at 15:10

## 2018-01-01 RX ADMIN — LEVOTHYROXINE SODIUM 137 MCG: 50 TABLET ORAL at 06:00

## 2018-01-01 RX ADMIN — BUDESONIDE 500 MCG: 0.5 INHALANT RESPIRATORY (INHALATION) at 20:28

## 2018-01-01 RX ADMIN — BUDESONIDE 500 MCG: 0.5 INHALANT RESPIRATORY (INHALATION) at 08:02

## 2018-01-01 RX ADMIN — MIRTAZAPINE 15 MG: 15 TABLET, FILM COATED ORAL at 21:54

## 2018-01-01 RX ADMIN — HYDROCODONE BITARTRATE AND ACETAMINOPHEN 2 TABLET: 10; 325 TABLET ORAL at 09:30

## 2018-01-01 RX ADMIN — SERTRALINE HYDROCHLORIDE 50 MG: 50 TABLET ORAL at 09:21

## 2018-01-01 RX ADMIN — METHYLPREDNISOLONE SODIUM SUCCINATE 40 MG: 40 INJECTION, POWDER, FOR SOLUTION INTRAMUSCULAR; INTRAVENOUS at 22:40

## 2018-01-01 RX ADMIN — CEFTRIAXONE SODIUM 1 G: 1 INJECTION, POWDER, FOR SOLUTION INTRAMUSCULAR; INTRAVENOUS at 21:21

## 2018-01-01 RX ADMIN — GABAPENTIN 100 MG: 100 CAPSULE ORAL at 22:40

## 2018-01-01 RX ADMIN — SODIUM CHLORIDE 100 ML: 900 INJECTION, SOLUTION INTRAVENOUS at 18:16

## 2018-01-01 RX ADMIN — CLONAZEPAM 0.5 MG: 1 TABLET ORAL at 22:00

## 2018-01-01 RX ADMIN — BUDESONIDE 500 MCG: 0.5 INHALANT RESPIRATORY (INHALATION) at 08:56

## 2018-01-01 RX ADMIN — DOCUSATE SODIUM 100 MG: 50 LIQUID ORAL at 08:24

## 2018-01-01 RX ADMIN — DOCUSATE SODIUM 100 MG: 50 LIQUID ORAL at 16:59

## 2018-01-01 RX ADMIN — Medication 10 ML: at 14:30

## 2018-01-01 RX ADMIN — CETUXIMAB 430 MG: 2 SOLUTION INTRAVENOUS at 12:00

## 2018-01-01 RX ADMIN — ENOXAPARIN SODIUM 40 MG: 40 INJECTION SUBCUTANEOUS at 09:27

## 2018-01-01 RX ADMIN — GABAPENTIN 100 MG: 100 CAPSULE ORAL at 08:44

## 2018-01-01 RX ADMIN — METOPROLOL TARTRATE 25 MG: 25 TABLET, FILM COATED ORAL at 09:22

## 2018-01-01 RX ADMIN — ASPIRIN 81 MG 81 MG: 81 TABLET ORAL at 09:28

## 2018-01-01 RX ADMIN — ASPIRIN 81 MG 81 MG: 81 TABLET ORAL at 10:05

## 2018-01-01 RX ADMIN — GABAPENTIN 100 MG: 100 CAPSULE ORAL at 09:17

## 2018-01-01 RX ADMIN — METOPROLOL TARTRATE 25 MG: 25 TABLET, FILM COATED ORAL at 21:21

## 2018-01-01 RX ADMIN — Medication 10 ML: at 05:44

## 2018-01-01 RX ADMIN — HYDROCODONE BITARTRATE AND ACETAMINOPHEN 2 TABLET: 10; 325 TABLET ORAL at 01:51

## 2018-01-01 RX ADMIN — CEFTRIAXONE SODIUM 1 G: 1 INJECTION, POWDER, FOR SOLUTION INTRAMUSCULAR; INTRAVENOUS at 22:16

## 2018-01-01 RX ADMIN — MIRTAZAPINE 15 MG: 15 TABLET, FILM COATED ORAL at 21:57

## 2018-01-01 RX ADMIN — SODIUM CHLORIDE 500 ML: 900 INJECTION, SOLUTION INTRAVENOUS at 14:20

## 2018-01-01 RX ADMIN — AZITHROMYCIN 500 MG: 250 TABLET, FILM COATED ORAL at 21:55

## 2018-01-01 RX ADMIN — ASPIRIN 81 MG 81 MG: 81 TABLET ORAL at 09:05

## 2018-01-01 RX ADMIN — DIPHENHYDRAMINE HYDROCHLORIDE 50 MG: 50 INJECTION, SOLUTION INTRAMUSCULAR; INTRAVENOUS at 15:13

## 2018-01-01 RX ADMIN — METOPROLOL TARTRATE 25 MG: 25 TABLET, FILM COATED ORAL at 08:21

## 2018-01-01 RX ADMIN — GABAPENTIN 100 MG: 100 CAPSULE ORAL at 09:22

## 2018-01-01 RX ADMIN — BUDESONIDE 500 MCG: 0.5 INHALANT RESPIRATORY (INHALATION) at 07:26

## 2018-01-01 RX ADMIN — METHYLPREDNISOLONE SODIUM SUCCINATE 40 MG: 40 INJECTION, POWDER, FOR SOLUTION INTRAMUSCULAR; INTRAVENOUS at 06:00

## 2018-01-01 RX ADMIN — GABAPENTIN 100 MG: 100 CAPSULE ORAL at 09:30

## 2018-01-01 RX ADMIN — GABAPENTIN 100 MG: 100 CAPSULE ORAL at 22:01

## 2018-01-01 RX ADMIN — METHYLPREDNISOLONE SODIUM SUCCINATE 40 MG: 40 INJECTION, POWDER, FOR SOLUTION INTRAMUSCULAR; INTRAVENOUS at 08:11

## 2018-01-01 RX ADMIN — MENTHOL, CAMPHOR: 1.11; 1.11 LOTION TOPICAL at 18:16

## 2018-01-01 RX ADMIN — GABAPENTIN 100 MG: 100 CAPSULE ORAL at 16:59

## 2018-01-01 RX ADMIN — MIRTAZAPINE 15 MG: 15 TABLET, FILM COATED ORAL at 04:04

## 2018-01-01 RX ADMIN — SODIUM CHLORIDE 1000 ML: 900 INJECTION, SOLUTION INTRAVENOUS at 17:35

## 2018-01-01 RX ADMIN — POLYETHYLENE GLYCOL 3350 17 G: 17 POWDER, FOR SOLUTION ORAL at 08:24

## 2018-01-01 RX ADMIN — Medication 10 ML: at 14:02

## 2018-01-01 RX ADMIN — METOPROLOL TARTRATE 25 MG: 25 TABLET, FILM COATED ORAL at 22:01

## 2018-01-01 RX ADMIN — LEVOTHYROXINE SODIUM 137 MCG: 50 TABLET ORAL at 05:53

## 2018-01-01 RX ADMIN — SODIUM CHLORIDE 700 ML: 900 INJECTION, SOLUTION INTRAVENOUS at 01:02

## 2018-01-01 RX ADMIN — LEVOFLOXACIN 750 MG: 500 TABLET, FILM COATED ORAL at 13:15

## 2018-01-01 RX ADMIN — HYDROCODONE BITARTRATE AND ACETAMINOPHEN 2 TABLET: 10; 325 TABLET ORAL at 16:09

## 2018-01-01 RX ADMIN — Medication 10 ML: at 14:22

## 2018-01-01 RX ADMIN — CLONAZEPAM 0.5 MG: 1 TABLET ORAL at 09:23

## 2018-01-01 RX ADMIN — PREDNISONE 30 MG: 20 TABLET ORAL at 08:44

## 2018-01-01 RX ADMIN — Medication 5 ML: at 22:06

## 2018-01-01 RX ADMIN — METHYLPREDNISOLONE SODIUM SUCCINATE 40 MG: 40 INJECTION, POWDER, FOR SOLUTION INTRAMUSCULAR; INTRAVENOUS at 20:55

## 2018-01-01 RX ADMIN — METOPROLOL TARTRATE 25 MG: 25 TABLET, FILM COATED ORAL at 09:30

## 2018-01-01 RX ADMIN — MIRTAZAPINE 15 MG: 15 TABLET, FILM COATED ORAL at 21:00

## 2018-01-01 RX ADMIN — HYDROCODONE BITARTRATE AND ACETAMINOPHEN 2 TABLET: 10; 325 TABLET ORAL at 08:41

## 2018-01-01 RX ADMIN — CEFTRIAXONE SODIUM 1 G: 1 INJECTION, POWDER, FOR SOLUTION INTRAMUSCULAR; INTRAVENOUS at 21:55

## 2018-01-01 RX ADMIN — PANTOPRAZOLE SODIUM 40 MG: 40 TABLET, DELAYED RELEASE ORAL at 05:32

## 2018-01-01 RX ADMIN — METOPROLOL TARTRATE 25 MG: 25 TABLET, FILM COATED ORAL at 22:30

## 2018-01-01 RX ADMIN — HYDROCODONE BITARTRATE AND ACETAMINOPHEN 2 TABLET: 10; 325 TABLET ORAL at 21:56

## 2018-01-01 RX ADMIN — METOPROLOL TARTRATE 25 MG: 25 TABLET, FILM COATED ORAL at 22:17

## 2018-01-01 RX ADMIN — METOPROLOL TARTRATE 25 MG: 25 TABLET, FILM COATED ORAL at 21:00

## 2018-01-01 RX ADMIN — BUDESONIDE 500 MCG: 0.5 INHALANT RESPIRATORY (INHALATION) at 21:04

## 2018-01-01 RX ADMIN — GABAPENTIN 100 MG: 100 CAPSULE ORAL at 21:01

## 2018-01-01 RX ADMIN — METOPROLOL TARTRATE 25 MG: 25 TABLET, FILM COATED ORAL at 08:40

## 2018-01-01 RX ADMIN — CLONAZEPAM 1 MG: 1 TABLET ORAL at 08:21

## 2018-01-01 RX ADMIN — CETUXIMAB 430 MG: 2 SOLUTION INTRAVENOUS at 12:48

## 2018-01-01 RX ADMIN — HYDROCODONE BITARTRATE AND ACETAMINOPHEN 2 TABLET: 10; 325 TABLET ORAL at 16:35

## 2018-01-01 RX ADMIN — HYDROCODONE BITARTRATE AND ACETAMINOPHEN 2 TABLET: 10; 325 TABLET ORAL at 12:49

## 2018-01-01 RX ADMIN — PIPERACILLIN SODIUM,TAZOBACTAM SODIUM 4.5 G: 4; .5 INJECTION, POWDER, FOR SOLUTION INTRAVENOUS at 22:31

## 2018-01-01 RX ADMIN — SERTRALINE HYDROCHLORIDE 50 MG: 50 TABLET ORAL at 09:50

## 2018-01-01 RX ADMIN — BUDESONIDE 500 MCG: 0.5 INHALANT RESPIRATORY (INHALATION) at 08:10

## 2018-01-01 RX ADMIN — BUDESONIDE 500 MCG: 0.5 INHALANT RESPIRATORY (INHALATION) at 20:49

## 2018-01-01 RX ADMIN — METOPROLOL TARTRATE 25 MG: 25 TABLET, FILM COATED ORAL at 22:29

## 2018-01-01 RX ADMIN — METOPROLOL TARTRATE 25 MG: 25 TABLET, FILM COATED ORAL at 21:57

## 2018-01-01 RX ADMIN — GABAPENTIN 100 MG: 100 CAPSULE ORAL at 17:12

## 2018-01-01 RX ADMIN — SODIUM CHLORIDE 1000 ML: 900 INJECTION, SOLUTION INTRAVENOUS at 16:21

## 2018-01-01 RX ADMIN — ASPIRIN 81 MG 81 MG: 81 TABLET ORAL at 08:40

## 2018-01-01 RX ADMIN — GABAPENTIN 100 MG: 100 CAPSULE ORAL at 22:29

## 2018-01-01 RX ADMIN — DOCUSATE SODIUM 100 MG: 50 LIQUID ORAL at 17:24

## 2018-01-01 RX ADMIN — GABAPENTIN 100 MG: 100 CAPSULE ORAL at 16:35

## 2018-01-01 RX ADMIN — GABAPENTIN 100 MG: 100 CAPSULE ORAL at 17:14

## 2018-01-01 RX ADMIN — ASPIRIN 81 MG 81 MG: 81 TABLET ORAL at 08:22

## 2018-01-01 RX ADMIN — HYDROCODONE BITARTRATE AND ACETAMINOPHEN 2 TABLET: 10; 325 TABLET ORAL at 09:42

## 2018-01-01 RX ADMIN — CLONAZEPAM 0.5 MG: 1 TABLET ORAL at 22:32

## 2018-01-01 RX ADMIN — HYDROCODONE BITARTRATE AND ACETAMINOPHEN 2 TABLET: 10; 325 TABLET ORAL at 05:55

## 2018-01-01 RX ADMIN — Medication 10 ML: at 06:10

## 2018-01-01 RX ADMIN — Medication 10 ML: at 22:29

## 2018-01-01 RX ADMIN — ASPIRIN 81 MG 81 MG: 81 TABLET ORAL at 08:10

## 2018-01-01 RX ADMIN — SODIUM CHLORIDE 150 MG: 900 INJECTION, SOLUTION INTRAVENOUS at 11:21

## 2018-01-01 RX ADMIN — BUDESONIDE 500 MCG: 0.5 INHALANT RESPIRATORY (INHALATION) at 20:51

## 2018-01-01 RX ADMIN — BUDESONIDE 500 MCG: 0.5 INHALANT RESPIRATORY (INHALATION) at 08:45

## 2018-01-01 RX ADMIN — SODIUM CHLORIDE 100 ML: 900 INJECTION, SOLUTION INTRAVENOUS at 23:45

## 2018-01-01 RX ADMIN — GABAPENTIN 100 MG: 100 CAPSULE ORAL at 21:58

## 2018-01-01 RX ADMIN — Medication 10 ML: at 14:45

## 2018-01-01 RX ADMIN — GABAPENTIN 100 MG: 100 CAPSULE ORAL at 15:39

## 2018-01-01 RX ADMIN — METHYLPREDNISOLONE SODIUM SUCCINATE 40 MG: 40 INJECTION, POWDER, FOR SOLUTION INTRAMUSCULAR; INTRAVENOUS at 22:29

## 2018-01-01 RX ADMIN — MIRTAZAPINE 15 MG: 15 TABLET, FILM COATED ORAL at 22:40

## 2018-01-01 RX ADMIN — MIRTAZAPINE 15 MG: 15 TABLET, FILM COATED ORAL at 22:18

## 2018-01-01 RX ADMIN — METHYLPREDNISOLONE SODIUM SUCCINATE 40 MG: 40 INJECTION, POWDER, FOR SOLUTION INTRAMUSCULAR; INTRAVENOUS at 08:21

## 2018-01-01 RX ADMIN — PIPERACILLIN SODIUM,TAZOBACTAM SODIUM 4.5 G: 4; .5 INJECTION, POWDER, FOR SOLUTION INTRAVENOUS at 10:26

## 2018-01-01 RX ADMIN — CLONAZEPAM 0.5 MG: 1 TABLET ORAL at 10:05

## 2018-01-01 RX ADMIN — CLONAZEPAM 0.5 MG: 1 TABLET ORAL at 00:05

## 2018-01-01 RX ADMIN — PREDNISONE 40 MG: 20 TABLET ORAL at 10:21

## 2018-01-01 RX ADMIN — METOPROLOL TARTRATE 25 MG: 25 TABLET, FILM COATED ORAL at 08:22

## 2018-01-01 RX ADMIN — GABAPENTIN 100 MG: 100 CAPSULE ORAL at 09:05

## 2018-01-01 RX ADMIN — ENOXAPARIN SODIUM 40 MG: 40 INJECTION SUBCUTANEOUS at 08:21

## 2018-01-01 RX ADMIN — POLYETHYLENE GLYCOL 3350 17 G: 17 POWDER, FOR SOLUTION ORAL at 09:21

## 2018-01-01 RX ADMIN — VANCOMYCIN HYDROCHLORIDE 1250 MG: 10 INJECTION, POWDER, LYOPHILIZED, FOR SOLUTION INTRAVENOUS at 02:23

## 2018-01-01 RX ADMIN — CETUXIMAB 430 MG: 2 SOLUTION INTRAVENOUS at 15:53

## 2018-01-01 RX ADMIN — LEVOTHYROXINE SODIUM 137 MCG: 50 TABLET ORAL at 05:54

## 2018-01-01 RX ADMIN — Medication 10 ML: at 06:25

## 2018-01-01 RX ADMIN — MIRTAZAPINE 15 MG: 15 TABLET, FILM COATED ORAL at 22:17

## 2018-01-01 RX ADMIN — METOPROLOL TARTRATE 25 MG: 25 TABLET, FILM COATED ORAL at 10:05

## 2018-01-01 RX ADMIN — DIPHENHYDRAMINE HYDROCHLORIDE 50 MG: 50 INJECTION, SOLUTION INTRAMUSCULAR; INTRAVENOUS at 14:52

## 2018-01-01 RX ADMIN — LEVOFLOXACIN 750 MG: 5 INJECTION, SOLUTION INTRAVENOUS at 14:02

## 2018-01-01 RX ADMIN — SODIUM CHLORIDE 1000 ML: 900 INJECTION, SOLUTION INTRAVENOUS at 23:36

## 2018-01-01 RX ADMIN — AZITHROMYCIN MONOHYDRATE 500 MG: 500 INJECTION, POWDER, LYOPHILIZED, FOR SOLUTION INTRAVENOUS at 01:02

## 2018-01-01 RX ADMIN — Medication 5 ML: at 16:44

## 2018-01-01 RX ADMIN — PREDNISONE 30 MG: 20 TABLET ORAL at 08:21

## 2018-01-01 RX ADMIN — LEVOTHYROXINE SODIUM 137 MCG: 50 TABLET ORAL at 05:34

## 2018-01-01 RX ADMIN — SERTRALINE HYDROCHLORIDE 50 MG: 50 TABLET ORAL at 09:05

## 2018-01-01 RX ADMIN — Medication: at 18:16

## 2018-01-01 RX ADMIN — CLONAZEPAM 0.5 MG: 1 TABLET ORAL at 08:21

## 2018-01-01 RX ADMIN — METHYLPREDNISOLONE SODIUM SUCCINATE 40 MG: 40 INJECTION, POWDER, FOR SOLUTION INTRAMUSCULAR; INTRAVENOUS at 09:27

## 2018-01-01 RX ADMIN — DEXAMETHASONE SODIUM PHOSPHATE 10 MG: 10 INJECTION INTRAMUSCULAR; INTRAVENOUS at 11:19

## 2018-01-01 RX ADMIN — HYDROCODONE BITARTRATE AND ACETAMINOPHEN 2 TABLET: 10; 325 TABLET ORAL at 20:21

## 2018-01-01 RX ADMIN — ASPIRIN 81 MG 81 MG: 81 TABLET ORAL at 08:44

## 2018-01-01 RX ADMIN — LEVOTHYROXINE SODIUM 137 MCG: 50 TABLET ORAL at 06:07

## 2018-01-01 RX ADMIN — LEVOFLOXACIN 750 MG: 500 TABLET, FILM COATED ORAL at 15:39

## 2018-01-01 RX ADMIN — METHYLPREDNISOLONE SODIUM SUCCINATE 40 MG: 40 INJECTION, POWDER, FOR SOLUTION INTRAMUSCULAR; INTRAVENOUS at 14:47

## 2018-01-01 RX ADMIN — DOCUSATE SODIUM 100 MG: 50 LIQUID ORAL at 09:18

## 2018-01-01 RX ADMIN — MIRTAZAPINE 15 MG: 15 TABLET, FILM COATED ORAL at 21:56

## 2018-01-01 RX ADMIN — CLONAZEPAM 0.5 MG: 1 TABLET ORAL at 09:00

## 2018-01-01 RX ADMIN — Medication 10 ML: at 15:40

## 2018-01-01 RX ADMIN — Medication 5 ML: at 15:12

## 2018-01-01 RX ADMIN — SERTRALINE HYDROCHLORIDE 50 MG: 50 TABLET ORAL at 08:40

## 2018-01-01 RX ADMIN — ENOXAPARIN SODIUM 40 MG: 40 INJECTION SUBCUTANEOUS at 10:05

## 2018-01-01 RX ADMIN — CETUXIMAB 820 MG: 2 SOLUTION INTRAVENOUS at 15:08

## 2018-01-01 RX ADMIN — ENOXAPARIN SODIUM 40 MG: 40 INJECTION SUBCUTANEOUS at 10:27

## 2018-01-01 RX ADMIN — HYDROCODONE BITARTRATE AND ACETAMINOPHEN 2 TABLET: 10; 325 TABLET ORAL at 04:38

## 2018-01-01 RX ADMIN — BUDESONIDE 500 MCG: 0.5 INHALANT RESPIRATORY (INHALATION) at 20:34

## 2018-01-01 RX ADMIN — CLONAZEPAM 0.5 MG: 1 TABLET ORAL at 22:29

## 2018-01-01 RX ADMIN — Medication 5 ML: at 21:58

## 2018-01-01 RX ADMIN — Medication 5 ML: at 13:15

## 2018-01-01 RX ADMIN — METOPROLOL TARTRATE 25 MG: 25 TABLET, FILM COATED ORAL at 09:05

## 2018-01-01 RX ADMIN — CEFTRIAXONE SODIUM 1 G: 1 INJECTION, POWDER, FOR SOLUTION INTRAMUSCULAR; INTRAVENOUS at 23:38

## 2018-01-01 RX ADMIN — CLONAZEPAM 0.5 MG: 1 TABLET ORAL at 21:19

## 2018-01-01 RX ADMIN — GABAPENTIN 100 MG: 100 CAPSULE ORAL at 16:43

## 2018-01-01 RX ADMIN — ASPIRIN 81 MG 81 MG: 81 TABLET ORAL at 09:17

## 2018-01-01 RX ADMIN — DIPHENHYDRAMINE HYDROCHLORIDE 50 MG: 50 INJECTION, SOLUTION INTRAMUSCULAR; INTRAVENOUS at 14:35

## 2018-01-01 RX ADMIN — METOPROLOL TARTRATE 25 MG: 25 TABLET, FILM COATED ORAL at 09:17

## 2018-01-01 RX ADMIN — IOPAMIDOL 100 ML: 755 INJECTION, SOLUTION INTRAVENOUS at 23:45

## 2018-01-01 RX ADMIN — MIRTAZAPINE 15 MG: 15 TABLET, FILM COATED ORAL at 22:29

## 2018-01-01 RX ADMIN — HYDROCODONE BITARTRATE AND ACETAMINOPHEN 2 TABLET: 10; 325 TABLET ORAL at 00:11

## 2018-01-01 RX ADMIN — CLONAZEPAM 0.5 MG: 1 TABLET ORAL at 21:57

## 2018-01-01 RX ADMIN — LEVOTHYROXINE SODIUM 137 MCG: 50 TABLET ORAL at 05:33

## 2018-01-01 RX ADMIN — ONDANSETRON 8 MG: 2 INJECTION INTRAMUSCULAR; INTRAVENOUS at 11:18

## 2018-01-01 RX ADMIN — BUDESONIDE 500 MCG: 0.5 INHALANT RESPIRATORY (INHALATION) at 22:06

## 2018-01-01 RX ADMIN — METHYLPREDNISOLONE SODIUM SUCCINATE 40 MG: 40 INJECTION, POWDER, FOR SOLUTION INTRAMUSCULAR; INTRAVENOUS at 21:55

## 2018-01-01 RX ADMIN — DOCUSATE SODIUM 100 MG: 50 LIQUID ORAL at 09:51

## 2018-01-01 RX ADMIN — SERTRALINE HYDROCHLORIDE 50 MG: 50 TABLET ORAL at 08:44

## 2018-01-01 RX ADMIN — BUDESONIDE 500 MCG: 0.5 INHALANT RESPIRATORY (INHALATION) at 09:13

## 2018-01-01 RX ADMIN — BUDESONIDE 500 MCG: 0.5 INHALANT RESPIRATORY (INHALATION) at 07:42

## 2018-01-01 RX ADMIN — PIPERACILLIN SODIUM,TAZOBACTAM SODIUM 4.5 G: 4; .5 INJECTION, POWDER, FOR SOLUTION INTRAVENOUS at 15:07

## 2018-01-01 RX ADMIN — LEVOTHYROXINE SODIUM 137 MCG: 50 TABLET ORAL at 05:41

## 2018-01-01 RX ADMIN — METOPROLOL TARTRATE 25 MG: 25 TABLET, FILM COATED ORAL at 08:44

## 2018-01-01 RX ADMIN — ENOXAPARIN SODIUM 40 MG: 40 INJECTION SUBCUTANEOUS at 09:30

## 2018-01-01 RX ADMIN — ENOXAPARIN SODIUM 40 MG: 40 INJECTION SUBCUTANEOUS at 09:23

## 2018-01-01 RX ADMIN — Medication 10 ML: at 05:34

## 2018-01-01 RX ADMIN — POLYETHYLENE GLYCOL 3350 17 G: 17 POWDER, FOR SOLUTION ORAL at 09:49

## 2018-01-01 RX ADMIN — Medication 10 ML: at 22:01

## 2018-01-01 RX ADMIN — CLONAZEPAM 0.5 MG: 1 TABLET ORAL at 09:27

## 2018-01-01 RX ADMIN — POLYETHYLENE GLYCOL 3350 17 G: 17 POWDER, FOR SOLUTION ORAL at 14:02

## 2018-01-01 RX ADMIN — Medication 10 ML: at 22:41

## 2018-01-01 RX ADMIN — Medication 10 ML: at 18:05

## 2018-01-01 RX ADMIN — ENOXAPARIN SODIUM 40 MG: 40 INJECTION SUBCUTANEOUS at 10:09

## 2018-01-01 RX ADMIN — Medication 10 ML: at 06:19

## 2018-01-01 RX ADMIN — BUDESONIDE 500 MCG: 0.5 INHALANT RESPIRATORY (INHALATION) at 08:00

## 2018-01-01 RX ADMIN — DEXTROSE MONOHYDRATE AND SODIUM CHLORIDE 100 ML/HR: 5; .9 INJECTION, SOLUTION INTRAVENOUS at 14:57

## 2018-01-01 RX ADMIN — Medication 10 ML: at 21:11

## 2018-01-01 RX ADMIN — Medication 5 ML: at 05:43

## 2018-01-01 RX ADMIN — SERTRALINE HYDROCHLORIDE 50 MG: 50 TABLET ORAL at 08:10

## 2018-01-01 RX ADMIN — HYDROCODONE BITARTRATE AND ACETAMINOPHEN 2 TABLET: 10; 325 TABLET ORAL at 09:22

## 2018-01-01 RX ADMIN — GABAPENTIN 100 MG: 100 CAPSULE ORAL at 16:50

## 2018-01-01 RX ADMIN — BUDESONIDE 500 MCG: 0.5 INHALANT RESPIRATORY (INHALATION) at 20:55

## 2018-01-01 RX ADMIN — Medication 10 ML: at 22:32

## 2018-01-01 RX ADMIN — Medication 10 ML: at 05:41

## 2018-01-01 RX ADMIN — MAGNESIUM SULFATE HEPTAHYDRATE 2 G: 40 INJECTION, SOLUTION INTRAVENOUS at 14:15

## 2018-01-01 RX ADMIN — GABAPENTIN 100 MG: 100 CAPSULE ORAL at 16:09

## 2018-01-01 RX ADMIN — LEVOFLOXACIN 750 MG: 500 TABLET, FILM COATED ORAL at 14:14

## 2018-01-01 RX ADMIN — Medication 10 ML: at 18:15

## 2018-01-01 RX ADMIN — GABAPENTIN 100 MG: 100 CAPSULE ORAL at 09:51

## 2018-01-01 RX ADMIN — LEVOTHYROXINE SODIUM 137 MCG: 50 TABLET ORAL at 05:43

## 2018-01-01 RX ADMIN — METHYLPREDNISOLONE SODIUM SUCCINATE 40 MG: 40 INJECTION, POWDER, FOR SOLUTION INTRAMUSCULAR; INTRAVENOUS at 09:22

## 2018-01-01 RX ADMIN — HYDROCODONE BITARTRATE AND ACETAMINOPHEN 2 TABLET: 10; 325 TABLET ORAL at 12:15

## 2018-01-01 RX ADMIN — Medication 10 ML: at 05:54

## 2018-01-01 RX ADMIN — HYDROCODONE BITARTRATE AND ACETAMINOPHEN 2 TABLET: 10; 325 TABLET ORAL at 16:15

## 2018-01-01 RX ADMIN — PANTOPRAZOLE SODIUM 40 MG: 40 TABLET, DELAYED RELEASE ORAL at 05:42

## 2018-01-01 RX ADMIN — LEVOTHYROXINE SODIUM 137 MCG: 50 TABLET ORAL at 06:25

## 2018-01-01 RX ADMIN — AZITHROMYCIN 500 MG: 250 TABLET, FILM COATED ORAL at 22:16

## 2018-01-01 RX ADMIN — POLYETHYLENE GLYCOL 3350 17 G: 17 POWDER, FOR SOLUTION ORAL at 09:22

## 2018-01-01 RX ADMIN — LEVOTHYROXINE SODIUM 137 MCG: 50 TABLET ORAL at 06:19

## 2018-01-01 RX ADMIN — METHYLPREDNISOLONE SODIUM SUCCINATE 40 MG: 40 INJECTION, POWDER, FOR SOLUTION INTRAMUSCULAR; INTRAVENOUS at 09:23

## 2018-01-01 RX ADMIN — CLONAZEPAM 0.5 MG: 1 TABLET ORAL at 09:29

## 2018-01-01 RX ADMIN — SERTRALINE HYDROCHLORIDE 50 MG: 50 TABLET ORAL at 08:22

## 2018-01-01 RX ADMIN — PANTOPRAZOLE SODIUM 40 MG: 40 TABLET, DELAYED RELEASE ORAL at 06:25

## 2018-01-01 RX ADMIN — GABAPENTIN 100 MG: 100 CAPSULE ORAL at 08:20

## 2018-01-01 RX ADMIN — DIATRIZOATE MEGLUMINE AND DIATRIZOATE SODIUM 15 ML: 660; 100 LIQUID ORAL; RECTAL at 14:59

## 2018-01-01 RX ADMIN — LEVOFLOXACIN 750 MG: 5 INJECTION, SOLUTION INTRAVENOUS at 14:40

## 2018-01-01 RX ADMIN — Medication 5 ML: at 13:12

## 2018-01-01 RX ADMIN — METHYLPREDNISOLONE SODIUM SUCCINATE 40 MG: 40 INJECTION, POWDER, FOR SOLUTION INTRAMUSCULAR; INTRAVENOUS at 22:31

## 2018-01-01 RX ADMIN — METHYLPREDNISOLONE SODIUM SUCCINATE 40 MG: 40 INJECTION, POWDER, FOR SOLUTION INTRAMUSCULAR; INTRAVENOUS at 05:44

## 2018-01-01 RX ADMIN — GABAPENTIN 100 MG: 100 CAPSULE ORAL at 08:21

## 2018-01-01 RX ADMIN — BUDESONIDE 500 MCG: 0.5 INHALANT RESPIRATORY (INHALATION) at 08:50

## 2018-01-01 RX ADMIN — SERTRALINE HYDROCHLORIDE 50 MG: 50 TABLET ORAL at 10:05

## 2018-01-01 RX ADMIN — ASPIRIN 81 MG 81 MG: 81 TABLET ORAL at 09:51

## 2018-01-01 RX ADMIN — PREDNISONE 30 MG: 20 TABLET ORAL at 08:40

## 2018-01-01 RX ADMIN — METOPROLOL TARTRATE 25 MG: 25 TABLET, FILM COATED ORAL at 09:21

## 2018-01-01 RX ADMIN — ASPIRIN 81 MG 81 MG: 81 TABLET ORAL at 08:21

## 2018-01-01 RX ADMIN — SODIUM CHLORIDE 500 ML: 900 INJECTION, SOLUTION INTRAVENOUS at 15:15

## 2018-01-01 RX ADMIN — Medication 10 ML: at 21:54

## 2018-01-01 RX ADMIN — GABAPENTIN 100 MG: 100 CAPSULE ORAL at 09:21

## 2018-01-01 RX ADMIN — ENOXAPARIN SODIUM 40 MG: 40 INJECTION SUBCUTANEOUS at 09:52

## 2018-01-01 RX ADMIN — PANTOPRAZOLE SODIUM 40 MG: 40 TABLET, DELAYED RELEASE ORAL at 06:07

## 2018-01-01 RX ADMIN — GABAPENTIN 100 MG: 100 CAPSULE ORAL at 08:10

## 2018-01-01 RX ADMIN — METHYLPREDNISOLONE SODIUM SUCCINATE 40 MG: 40 INJECTION, POWDER, FOR SOLUTION INTRAMUSCULAR; INTRAVENOUS at 09:29

## 2018-01-01 RX ADMIN — MIRTAZAPINE 15 MG: 15 TABLET, FILM COATED ORAL at 22:32

## 2018-01-01 RX ADMIN — GABAPENTIN 100 MG: 100 CAPSULE ORAL at 21:54

## 2018-01-01 RX ADMIN — ENOXAPARIN SODIUM 40 MG: 40 INJECTION SUBCUTANEOUS at 09:21

## 2018-01-02 NOTE — PROGRESS NOTES
Arrived to the [facility]. Erbitux infusion completed. Patient tolerated well. Observed one hour post infusion. Any issues or concerns during appointment: NO.  Patient aware of next infusion appointment on 01/08/18 (date) at 200 (time). Discharged via wheelchair.

## 2018-01-08 NOTE — PROGRESS NOTES
Arrived to the [facility]. Erbitux completed. Patient tolerated well. Any issues or concerns during appointment: none. Patient aware of next infusion appointment on 01/15 (date) at 200 (time). Discharged home.

## 2018-01-15 NOTE — PROGRESS NOTES
Arrived to the [facility]. Erbitux and Mag 2 grams iv completed. Patient tolerated well. Any issues or concerns during appointment: none. Patient aware of next infusion appointment on 1/23 (date) at 0800 (time). Discharged home.

## 2018-01-20 NOTE — ED NOTES
I have reviewed discharge instructions with the patient. The patient verbalized understanding. Patient left ED via Discharge Method: ambulatory to Home with self    Opportunity for questions and clarification provided. Patient given 1 scripts. To continue your aftercare when you leave the hospital, you may receive an automated call from our care team to check in on how you are doing. This is a free service and part of our promise to provide the best care and service to meet your aftercare needs.  If you have questions, or wish to unsubscribe from this service please call [phone]. Thank you for Choosing our [address] [place] [address] Emergency Department.

## 2018-01-20 NOTE — ED NOTES
Patient arrives [facility] for constipation for 4 days and weakness. bp-160/70 hr-70s.  Last chemo Monday

## 2018-01-20 NOTE — ED NOTES
Patient up at side of bed to urinate. BP is cycling every 20 minutes. Will notify physician of next BP.

## 2018-01-20 NOTE — ED TRIAGE NOTES
Patient states constipation starting today. Patient had peg. Wife attempt prune juice. States ativan started Monday but hasn't had since Wednesday. Patient had tongue cancer. Patient last chemo Monday.

## 2018-01-20 NOTE — DISCHARGE INSTRUCTIONS
Constipation: Care Instructions  Your Care Instructions    Constipation means that you have a hard time passing stools (bowel movements). People pass stools from 3 times a day to once every 3 days. What is normal for you may be different. Constipation may occur with pain in the rectum and cramping. The pain may get worse when you try to pass stools. Sometimes there are small amounts of bright red blood on toilet paper or the surface of stools. This is because of enlarged veins near the rectum (hemorrhoids). A few changes in your diet and lifestyle may help you avoid ongoing constipation. Your doctor may also prescribe medicine to help loosen your stool. Some medicines can cause constipation. These include pain medicines and antidepressants. Tell your doctor about all the medicines you take. Your doctor may want to make a medicine change to ease your symptoms. Follow-up care is a key part of your treatment and safety. Be sure to make and go to all appointments, and call your doctor if you are having problems. It's also a good idea to know your test results and keep a list of the medicines you take. How can you care for yourself at home? · Drink plenty of fluids, enough so that your urine is light yellow or clear like water. If you have kidney, heart, or liver disease and have to limit fluids, talk with your doctor before you increase the amount of fluids you drink. · Include high-fiber foods in your diet each day. These include fruits, vegetables, beans, and whole grains. · Get at least 30 minutes of exercise on most days of the week. Walking is a good choice. You also may want to do other activities, such as running, swimming, cycling, or playing tennis or team sports. · Take a fiber supplement, such as Citrucel or Metamucil, every day. Read and follow all instructions on the label. · Schedule time each day for a bowel movement. A daily routine may help.  Take your time having your bowel movement. · Support your feet with a small step stool when you sit on the toilet. This helps flex your hips and places your pelvis in a squatting position. · Your doctor may recommend an over-the-counter laxative to relieve your constipation. Examples are Milk of Magnesia and MiraLax. Read and follow all instructions on the label. Do not use laxatives on a long-term basis. When should you call for help? Call your doctor now or seek immediate medical care if:  ? · You have new or worse belly pain. ? · You have new or worse nausea or vomiting. ? · You have blood in your stools. ? Watch closely for changes in your health, and be sure to contact your doctor if:  ? · Your constipation is getting worse. ? · You do not get better as expected. Where can you learn more? Go to http://carol-joanie.info/. Enter 21 [id number] in the search box to learn more about \"Constipation: Care Instructions. \"  Current as of: March 20, 2017  Content Version: 11.4  © 4415-7001 [employer]. Care instructions adapted under license by [employer] (which disclaims liability or warranty for this information). If you have questions about a medical condition or this instruction, always ask your healthcare professional. [address] any warranty or liability for your use of this information.

## 2018-01-20 NOTE — ED PROVIDER NOTES
HPI Comments: 78 yo male presenting with constipation. Has had cancer at the base of the tongue on chemo has trach and peg. They have given him prune juice. He has had some leakage. Last bm was 4 days ago. He has had some increased urination. He is on opiates for the cancer. Family gave him one over-the-counter laxative. They have MiraLAX and mag citrate at home but did not want to use these as patient has had increased bowel movements from these and became dehydrated. Patient is a 79 y.o. male presenting with constipation. The history is provided by the patient. Constipation    Associated symptoms include constipation. Pertinent negatives include no abdominal pain, no chills, no fever, no nausea, no vomiting and no diarrhea. Past Medical History:   Diagnosis Date    Airway obstruction 4/20/2016    Allergic rhinitis     Anxiety 3/24/2016    BPH (benign prostatic hypertrophy)     Cerumen impaction     Chronic otitis media     Chronic pharyngitis     Condyloma acuminatum     Dyslipidemia 3/24/2016    Dyspnea 4/8/2016    ETD (eustachian tube dysfunction)     Former smoker     GERD (gastroesophageal reflux disease)     at times    History of condyloma acuminatum     on tongue    History of parotid cancer      History of sinus cancer 1989    Hypercholesterolemia 9/14/2016    Hypertension     Hypotension 4/21/2016    Hypothyroidism      secondary to radiation to neck    Ill-defined condition     left face twitching    Impotence of organic origin     Male stress incontinence 12/23/2014    Malignant neoplasm of head, face, and neck ([address] -- prior history.     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HCC)     Perforation of tympanic membrane  Personal history of prostate cancer     Pro[facility] cancer [facility]     diagnosed 2/24/11    SOB (shortness of breath)     Stridor 3/24/2016    Tracheal mass -- reproted sub-glotic 3/25/2016    Tracheal obstruction 3/24/2016    Tracheostomy in place [facility]) 6/20/2016    Vocal cord paralysis 4/20/2016       Past Surgical History:   Procedure Laterality Date    HX GI      esophageal dilation    HX HEENT  2009    Removal lymph nodes/saliva glands and radiation/chemo    HX HEENT  2010    left total parotidectomy & bilateral radical neck dissection    HX MYRINGOTOMY Bilateral 2010, 2011, 2012 2010-Dr. Moriah Fuller  5/2011     Dr. Baker Flavors in [place]    lymph nodes removed         Family History:   Problem Relation Age of Onset    Hypertension Mother     Cancer Mother      breast    Hypertension Father     Heart Disease Father        Social History     Social History    Marital status: SINGLE     Spouse name: N/A    Number of children: N/A    Years of education: N/A     Occupational History    [employer]      11 years     Social History Main Topics    Smoking status: Former Smoker     Packs/day: 0.25     Years: 8.00     Types: Cigarettes     Quit date: 4/20/1976    Smokeless tobacco: Never Used      Comment: Quit in the 1970s    Alcohol use No    Drug use: No    Sexual activity: Not on file     Other Topics Concern    Not on file     Social History Narrative    Lives at home and his sister and son live with him. ALLERGIES: Levofloxacin; Other plant, animal, environmental; and Sulfa (sulfonamide antibiotics)    Review of Systems   Constitutional: Negative for chills and fever. Respiratory: Negative for chest tightness, shortness of breath, wheezing and stridor. Cardiovascular: Negative for chest pain and palpitations. Gastrointestinal: Positive for constipation.  Negative for abdominal pain, blood in stool, diarrhea, nausea and vomiting. Skin: Negative. All other systems reviewed and are negative. Vitals:    01/20/18 1433   BP: 107/74   Pulse: (!) 104   Resp: 18   Temp: 99 °F (37.2 °C)   SpO2: 97%   Weight: 58.1 kg (128 lb)   Height: 5' 6\" (1.676 m)            Physical Exam   Constitutional: He is oriented to person, place, and time. He appears well-developed and well-nourished. No distress. HENT:   Head: Normocephalic and atraumatic. Eyes: Conjunctivae are normal. No scleral icterus. Neck: Normal range of motion. Neck supple. Cardiovascular: Normal rate, regular rhythm and normal heart sounds. Pulmonary/Chest: Effort normal and breath sounds normal. No stridor. No respiratory distress. He has no wheezes. He has no rales. He exhibits no tenderness. Abdominal: Soft. He exhibits no distension. There is no tenderness. There is no rebound and no guarding. Neurological: He is alert and oriented to person, place, and time. No focal weakness   Skin: Skin is warm and dry. No rash noted. He is not diaphoretic. No erythema. Psychiatric: He has a normal mood and affect. His behavior is normal.   Nursing note and vitals reviewed. MDM  Number of Diagnoses or Management Options  Constipation, unspecified constipation type:   Diagnosis management comments: Patient has signs of some dehydration and constipation. I have hydrated him and we'll have family start with mag citrate tonight. Lactate was slightly elevated, creatinine, white count, and temperature all negative.        Amount and/or Complexity of Data Reviewed  Clinical lab tests: ordered and reviewed (Results for orders placed or performed during the hospital encounter of 01/20/18  -INFLUENZA A & B AG (RAPID TEST)       Result                                            Value                         Ref Range                       Influenza A Ag                                    NEGATIVE                      NEG                             Influenza B Ag                                    NEGATIVE                      NEG                        -CBC WITH AUTOMATED DIFF       Result                                            Value                         Ref Range                       WBC                                               7.5                           4.3 - 11.1 K/uL                 RBC                                               4.63                          4.23 - 5.67 M/uL                HGB                                               13.4 (L)                      13.6 - 17.2 g/dL                HCT                                               41.1                          41.1 - 50.3 %                   MCV                                               88.8                          79.6 - 97.8 FL                  MCH                                               28.9                          26.1 - 32.9 PG                  MCHC                                              32.6                          31.4 - 35.0 g/dL                RDW                                               16.1 (H)                      11.9 - 14.6 %                   PLATELET                                          294                           150 - 450 K/uL                  MPV                                               9.3 (L)                       10.8 - 14.1 FL                  DF                                                AUTOMATED                                                     NEUTROPHILS                                       92 (H)                        43 - 78 %                       LYMPHOCYTES                                       4 (L)                         13 - 44 %                       MONOCYTES                                         3 (L)                         4.0 - 12.0 %                    EOSINOPHILS                                       0 (L)                         0.5 - 7.8 %                     BASOPHILS 0                             0.0 - 2.0 %                     IMMATURE GRANULOCYTES                             1                             0.0 - 5.0 %                     ABS. NEUTROPHILS                                  6.9                           1.7 - 8.2 K/UL                  ABS. LYMPHOCYTES                                  0.3 (L)                       0.5 - 4.6 K/UL                  ABS. MONOCYTES                                    0.2                           0.1 - 1.3 K/UL                  ABS. EOSINOPHILS                                  0.0                           0.0 - 0.8 K/UL                  ABS. BASOPHILS                                    0.0                           0.0 - 0.2 K/UL                  ABS. IMM.  GRANS.                                  0.1                           0.0 - 0.5 K/UL             -METABOLIC PANEL, COMPREHENSIVE       Result                                            Value                         Ref Range                       Sodium                                            137                           136 - 145 mmol/L                Potassium                                         4.2                           3.5 - 5.1 mmol/L                Chloride                                          98                            98 - 107 mmol/L                 CO2                                               31                            21 - 32 mmol/L                  Anion gap                                         8                             7 - 16 mmol/L                   Glucose                                           146 (H)                       65 - 100 mg/dL                  BUN                                               16                            8 - 23 MG/DL                    Creatinine                                        0.68 (L)                      0.8 - 1.5 MG/DL                 GFR est AA >60                           >60 ml/min/1.73m2               GFR est non-AA                                    >60                           >60 ml/min/1.73m2               Calcium                                           8.4                           8.3 - 10.4 MG/DL                Bilirubin, total                                  0.2                           0.2 - 1.1 MG/DL                 ALT (SGPT)                                        47                            12 - 65 U/L                     AST (SGOT)                                        25                            15 - 37 U/L                     Alk. phosphatase                                  116                           50 - 136 U/L                    Protein, total                                    6.7                           6.3 - 8.2 g/dL                  Albumin                                           3.0 (L)                       3.2 - 4.6 g/dL                  Globulin                                          3.7 (H)                       2.3 - 3.5 g/dL                  A-G Ratio                                         0.8 (L)                       1.2 - 3.5                  -PROCALCITONIN       Result                                            Value                         Ref Range                       Procalcitonin                                     <0.1                          ng/mL                      -URINE MICROSCOPIC       Result                                            Value                         Ref Range                       WBC                                               0-3                           0 /hpf                          RBC                                               0-3                           0 /hpf                          Epithelial cells                                  0-3                           0 /hpf                          Bacteria                                          0 0 /hpf                          Casts                                             0                             0 /lpf                     -POC LACTIC ACID       Result                                            Value                         Ref Range                       Lactic Acid (POC)                                 2.4 (H)                       0.5 - 1.9 mmol/L          )  Tests in the radiology section of CPT®: ordered and reviewed (Xr Abd Acute W 1 V Chest    Result Date: 1/20/2018  Abdominal Series CLINICAL INDICATION:  Abdominal distention, constipation, history of prostate cancer and head and neck cancer, GERD, hypertension, tracheal obstruction COMPARISON: Chest radiograph and CT 11/2/2017, abdominal radiograph 5/8/2017 TECHNIQUE: A frontal upright view of the chest and supine and upright views of the abdomen were obtained. FINDINGS: The lungs are clear. No infiltrate or effusion evident. The tracheostomy tube remains in place. Surgical clips again project over soft tissues of the neck. The mediastinal and hilar contours are stable, normal. Bones again demonstrate scattered sclerotic osseous metastases, not definitely changed since prior imaging. Bones are not entirely evaluated on this exam. No acute fractures are evident. Flat and upright views of the abdomen show no evidence of obstruction. No evidence of free air. Large volume stool is compatible with constipation. No definite abnormal calcifications are seen. Feeding tube projects over the left upper abdomen. IMPRESSION:  Constipation.  No bowel obstruction or free air.    )      ED Course       Procedures

## 2018-01-21 NOTE — ED PROVIDER NOTES
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 Patient had diarrhea throughout this morning which was distressing to her abdomen and his response to mag citrate taken for constipation diagnosed yesterday. Patient's family member felt like his complaint of shortness of breath was related to anxiety which she has had in the past.  The symptoms are currently resolved. Patient takes Lexapro and Ativan at night for  Anxiety and sleep. He has taken Ativan during the day but it is limited to to much somnolent so an occasional half of the dose is used. This was not taken today. Patient is a 79 y.o. male presenting with diarrhea and shortness of breath. The history is provided by a relative. Diarrhea    This is a new problem. Episode onset: 0630. The problem occurs constantly. The problem has been gradually improving. Associated with: mag citrate last night and 0230 for constipation. Pain location: denies pain. Associated symptoms include diarrhea. Pertinent negatives include no fever and no chest pain. Arthralgias: sob. Exacerbated by: mag citrate. Relieved by: 2 immodium. Shortness of Breath   This is a new problem. Episode frequency: 1. The current episode started 1 to 2 hours ago. The problem has been resolved. Pertinent negatives include no fever, no coryza, no rhinorrhea, no cough, no sputum production and no chest pain.         Past Medical History:   Diagnosis Date    Airway obstruction 4/20/2016    Allergic rhinitis     Anxiety 3/24/2016    BPH (benign prostatic hypertrophy)     Cerumen impaction     Chronic otitis media     Chronic pharyngitis     Condyloma acuminatum     Dyslipidemia 3/24/2016    Dyspnea 4/8/2016    ETD (eustachian tube dysfunction)     Former smoker     GERD (gastroesophageal reflux disease)     at times    History of condyloma acuminatum     on tongue    History of parotid cancer      History of sinus cancer 1989    Hypercholesterolemia 9/14/2016    Hypertension     Hypotension 4/21/2016    Hypothyroidism      secondary to radiation to neck    Ill-defined condition     left face twitching    Impotence of organic origin     Male stress incontinence 12/23/2014    Malignant neoplasm of head, face, and neck ([address] -- prior history.     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[address]     Perforation of tympanic membrane     Personal history of prostate cancer     Prostate cancer ([address]     diagnosed 2/24/11    SOB (shortness of breath)     Stridor 3/24/2016    Tracheal mass -- reproted sub-glotic 3/25/2016    Tracheal obstruction 3/24/2016    Tracheostomy in place [facility]) 6/20/2016    Vocal cord paralysis 4/20/2016       Past Surgical History:   Procedure Laterality Date    HX GI      esophageal dilation    HX HEENT  2009    Removal lymph nodes/saliva glands and radiation/chemo    HX HEENT  2010    left total parotidectomy & bilateral radical neck dissection    HX MYRINGOTOMY Bilateral 2010, 2011, 2012 2010-Dr. Brittny Acevedo  5/2011     Dr. Audra Lmi in [facility]    lymph nodes removed         Family History:   Problem Relation Age of Onset    Hypertension Mother     Cancer Mother      breast    Hypertension Father     Heart Disease Father        Social History     Social History    Marital status: SINGLE     Spouse name: N/A    Number of children: N/A    Years of education: N/A     Occupational History    [employer]      11 years     Social History Main Topics    Smoking status: Former Smoker     Packs/day: 0.25     Years: 8.00     Types: Cigarettes     Quit date: 4/20/1976    Smokeless tobacco: Never Used Comment: Quit in the 1970s    Alcohol use No    Drug use: No    Sexual activity: Not on file     Other Topics Concern    Not on file     Social History Narrative    Lives at home and his sister and son live with him. ALLERGIES: Levofloxacin; Other plant, animal, environmental; and Sulfa (sulfonamide antibiotics)    Review of Systems   Unable to perform ROS: Patient nonverbal   Constitutional: Negative for fever. HENT: Negative for rhinorrhea. Respiratory: Positive for shortness of breath. Negative for cough and sputum production. Cardiovascular: Negative for chest pain. Gastrointestinal: Positive for diarrhea. Musculoskeletal: Arthralgias: sob. Vitals:    01/21/18 1439   BP: 124/71   Pulse: 100   Resp: 18   Temp: 98.3 °F (36.8 °C)   SpO2: 100%            Physical Exam   Constitutional: He appears well-developed and well-nourished. No distress. HENT:   Mouth/Throat: Oropharynx is clear and moist. No oropharyngeal exudate. Eyes: Conjunctivae are normal. Pupils are equal, round, and reactive to light. Neck:   Trach in place. Neck is firm and indurated chronically. I confirmed this with his relative caregiver. Cardiovascular: Normal rate, regular rhythm and normal heart sounds. Pulmonary/Chest: Effort normal and breath sounds normal. No respiratory distress. He has no wheezes. He has no rales. Abdominal: Soft. Bowel sounds are normal. He exhibits no distension. There is no tenderness. There is no rebound and no guarding. Musculoskeletal: Normal range of motion. He exhibits no edema or tenderness. Lymphadenopathy:     He has no cervical adenopathy. Neurological: He is alert. Skin: Skin is warm and dry. Nursing note and vitals reviewed. MDM  Number of Diagnoses or Management Options  Diagnosis management comments: Check chest x-ray. History of thoracentesis in the remote past from pleural effusions.    No cough or cold symptoms or fever to suggest pneumonia. Rehydrated given  Dr. Frida Welsh impression yesterday that he was dehydrated yesterday and after diarrhea today. Check labs for electrolyte abnormality. Rehydrate and discharged home likely.        Amount and/or Complexity of Data Reviewed  Clinical lab tests: ordered and reviewed (Results for orders placed or performed during the hospital encounter of 01/21/18  -CBC W/O DIFF       Result                                            Value                         Ref Range                       WBC                                               7.5                           4.3 - 11.1 K/uL                 RBC                                               4.88                          4.23 - 5.67 M/uL                HGB                                               14.0                          13.6 - 17.2 g/dL                HCT                                               42.8                          41.1 - 50.3 %                   MCV                                               87.7                          79.6 - 97.8 FL                  MCH                                               28.7                          26.1 - 32.9 PG                  MCHC                                              32.7                          31.4 - 35.0 g/dL                RDW                                               16.2 (H)                      11.9 - 14.6 %                   PLATELET                                          276                           150 - 450 K/uL                  MPV                                               9.1 (L)                       10.8 - 14.1 FL             -METABOLIC PANEL, BASIC       Result                                            Value                         Ref Range                       Sodium                                            139                           136 - 145 mmol/L                Potassium                                         3.9 3.5 - 5.1 mmol/L                Chloride                                          100                           98 - 107 mmol/L                 CO2                                               29                            21 - 32 mmol/L                  Anion gap                                         10                            7 - 16 mmol/L                   Glucose                                           94                            65 - 100 mg/dL                  BUN                                               13                            8 - 23 MG/DL                    Creatinine                                        0.66 (L)                      0.8 - 1.5 MG/DL                 GFR est AA                                        >60                           >60 ml/min/1.73m2               GFR est non-AA                                    >60                           >60 ml/min/1.73m2               Calcium                                           8.6                           8.3 - 10.4 MG/DL           )  Tests in the radiology section of CPT®: ordered and reviewed (Xr Chest Port    Result Date: 1/21/2018  Chest portable CLINICAL INDICATION: Acute dyspnea, history of head and neck cancer and prostate cancer COMPARISON: 1/20/2018 TECHNIQUE: single AP portable view chest at 4:00 PM upright FINDINGS:  Tracheostomy tube is stable. There is no evidence of consolidation, pneumothorax, pleural effusion or pulmonary edema. The mediastinal and hilar contours are normal given technique. Multiple small round sclerotic lesions again project over left thoracic bones compatible with metastases and further evaluated on recent CT of 11/2/2017, not significantly changed. IMPRESSION: No acute disease.      )      ED Course       Procedures

## 2018-01-21 NOTE — ED NOTES
I have reviewed discharge instructions with the patient. The patient verbalized understanding. Patient left ED via Discharge Method: wheelchair to Home with wife. Opportunity for questions and clarification provided. Patient given 0 scripts. To continue your aftercare when you leave the hospital, you may receive an automated call from our care team to check in on how you are doing. This is a free service and part of our promise to provide the best care and service to meet your aftercare needs.  If you have questions, or wish to unsubscribe from this service please call [phone]. Thank you for Choosing our [facility].

## 2018-01-21 NOTE — ED TRIAGE NOTES
Pt arrives to the ER via EMS after he was seen yesterday for constipation and was treated and now cannot stop having bowel movements.  Pt is nonverbal, information from EMS.  VSS: 99 oral, 122/84, , 98% o2

## 2018-01-31 NOTE — PROGRESS NOTES
Arrived to the [facility]. Erbitux completed. Patient tolerated without problems  Any issues or concerns during appointment: Patient with elevated BP, has not taken his medication today. Patient denies dizziness or any discomfort.  Family will check his BP at home and give his medication  Patient aware of next infusion appointment on 2/14/18 at 1400  Discharged via [facility] To with family

## 2018-02-11 NOTE — Clinical Note
Keep follow-up place with your oncologist and ENT specialist.  Return immediately for worsening symptoms, concerns or questions. Utilize humidified oxygen therapy as instructed. Regularly suction tracheostomy to prevent for mucous plugging.

## 2018-02-11 NOTE — ED TRIAGE NOTES
Sister at bedside states pt was placed on Remeron on Wednesday, sister had some difficulty waking pt today. Pt states blurred vision. Sister states that pt has had some falls recently, equilibrium has been off. This was mentioned to oncology nurse and MRI to be ordered at next visit. Oncologist told family it could be because of his new meds but family states he had been falling since before starting the Remeron. Family also states hearing has worsened since starting oncology treatment.

## 2018-02-11 NOTE — ED TRIAGE NOTES
Pt arrives from home. EMS called out for breathing problems. Pt is nonverbal. Pt keeps pointing to throat and family stated pt kept saying it was tight around tracheostomy. EMS states pt is more alert than on their arrival. Family tells EMS that lips are generally swollen, but swelling goes down when pt is given daily ativan dose. Pt met dystonic reaction protocol - given 25 mg benadryl and 1 mg ativan en route. 12 lead clear. /68. . . End tidal 35. RR 28. O2 100% RA. Pt has hx of tongue cancer.

## 2018-02-11 NOTE — ED PROVIDER NOTES
HPI Comments: Presents with complaint of increased confusion and somnolence. Also having increased amount of swelling and feeling like there is something swollen around his trachea. Patient has a history of throat cancer status post trach and PEG. Family reports he is much more alert and less confused now. Patient states it still feels like something is in his throat. Sister reports suctioning him and cleaning cannula. Little secretions. He has not had his Decadron today. Sister reports this usually helps his facial swelling. Patient is a 79 y.o. male presenting with mouth swelling. The history is provided by a relative. The history is limited by the condition of the patient (pt nonverbal with trach). Mouth Swelling    The current episode started today. The problem occurs continuously. The problem has been unchanged. The problem is moderate. Associated symptoms include sore throat. Pertinent negatives include no fever and no mouth sores. He has been less responsive. Past Medical History:   Diagnosis Date    Airway obstruction 4/20/2016    Allergic rhinitis     Anxiety 3/24/2016    BPH (benign prostatic hypertrophy)     Cerumen impaction     Chronic otitis media     Chronic pharyngitis     Condyloma acuminatum     Dyslipidemia 3/24/2016    Dyspnea 4/8/2016    ETD (eustachian tube dysfunction)     Former smoker     GERD (gastroesophageal reflux disease)     at times    History of condyloma acuminatum     on tongue    History of parotid cancer      History of sinus cancer 1989    Hypercholesterolemia 9/14/2016    Hypertension     Hypotension 4/21/2016    Hypothyroidism      secondary to radiation to neck    Ill-defined condition     left face twitching    Impotence of organic origin     Male stress incontinence 12/23/2014    Malignant neoplasm of head, face, and neck ([address] -- prior history.     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    cancer in \"salivary gland\"    [facility]     Mixed hearing loss     Nasopharyngeal cancer ([facility])     w/XRT    Neoplasm of uncertain behavior of tongue     Otitis media     Parotid gland adenocarcinoma ([facility])     Perforation of tympanic membrane     Personal history of prostate cancer     Prostate cancer ([facility]     diagnosed 2/24/11    SOB (shortness of breath)     Stridor 3/24/2016    Tracheal mass -- reproted sub-glotic 3/25/2016    Tracheal obstruction 3/24/2016    Tracheostomy in place [facility]) 6/20/2016    Vocal cord paralysis 4/20/2016       Past Surgical History:   Procedure Laterality Date    HX GI      esophageal dilation    HX HEENT  2009    Removal lymph nodes/saliva glands and radiation/chemo    HX HEENT  2010    left total parotidectomy & bilateral radical neck dissection    HX MYRINGOTOMY Bilateral 2010, 2011, 2012 2010-Dr. Brittny Acevedo  5/2011     Dr. Audra Lim in [facility]    lymph nodes removed         Family History:   Problem Relation Age of Onset    Hypertension Mother     Cancer Mother      breast    Hypertension Father     Heart Disease Father        Social History     Social History    Marital status: SINGLE     Spouse name: N/A    Number of children: N/A    Years of education: N/A     Occupational History    [employer]      11 years     Social History Main Topics    Smoking status: Former Smoker     Packs/day: 0.25     Years: 8.00     Types: Cigarettes     Quit date: 4/20/1976    Smokeless tobacco: Never Used      Comment: Quit in the 1970s    Alcohol use No    Drug use: No    Sexual activity: Not on file     Other Topics Concern    Not on file     Social History Narrative    Lives at home and his sister and son live with him. ALLERGIES: Ativan [lorazepam]; Levofloxacin;  Other plant, animal, environmental; and Sulfa (sulfonamide antibiotics)    Review of Systems   Unable to perform ROS: Patient nonverbal   Constitutional: Negative for chills and fever. HENT: Positive for sore throat. Negative for mouth sores. Vitals:    02/11/18 1431   BP: 167/87   Pulse: (!) 108   Resp: 20   Temp: 98.5 °F (36.9 °C)   SpO2: 97%            Physical Exam   Constitutional: He appears well-developed and well-nourished. No distress. HENT:   Head: Normocephalic and atraumatic. Upper and lower lip edema, tongue edema, no drooling   Neck:   Trach placed   Cardiovascular: Normal rate and regular rhythm. Pulmonary/Chest: Effort normal and breath sounds normal. No respiratory distress. He has no wheezes. He has no rales. Abdominal: Soft. He exhibits no distension. There is no tenderness. There is no rebound and no guarding. Musculoskeletal: Normal range of motion. He exhibits no edema. Neurological: He is alert. No cranial nerve deficit. Skin: Skin is warm and dry. No rash noted. He is not diaphoretic. No erythema. Psychiatric: He has a normal mood and affect. His behavior is normal.   Nursing note and vitals reviewed. MDM  Number of Diagnoses or Management Options  Nasal cavity mass:   Transient alteration of awareness:   Diagnosis management comments: Pt nl mental status upon arrival.  No stridor or abn lung sounds. Sats nl. New findings of inflammation and mass vs edema nasally. D/w Dr. Ayana Mcginnis. Will see in office. [id number] [address] with going home. D/w pt and family CT findings and also ok with him going home to f/u with onc on Monday.          Amount and/or Complexity of Data Reviewed  Clinical lab tests: ordered and reviewed  Review and summarize past medical records: yes  Discuss the patient with other providers: yes  Independent visualization of images, tracings, or specimens: yes    Risk of Complications, Morbidity, and/or Mortality  Presenting problems: high  Diagnostic procedures: moderate  Management options: moderate    Patient Progress  Patient progress: stable        ED Course Procedures

## 2018-02-12 PROBLEM — R77.8 ELEVATED TROPONIN: Status: ACTIVE | Noted: 2018-01-01

## 2018-02-12 PROBLEM — R09.02 HYPOXIA: Status: ACTIVE | Noted: 2018-01-01

## 2018-02-12 PROBLEM — R55 SYNCOPE: Status: ACTIVE | Noted: 2018-01-01

## 2018-02-12 NOTE — ED NOTES
I have reviewed discharge instructions with the patient. The patient verbalized understanding. Patient left ED via Discharge Method: wheelchair to Home with family    Opportunity for questions and clarification provided. Patient given 2 scripts. To continue your aftercare when you leave the hospital, you may receive an automated call from our care team to check in on how you are doing. This is a free service and part of our promise to provide the best care and service to meet your aftercare needs.  If you have questions, or wish to unsubscribe from this service please call [phone]. Thank you for Choosing our [facility] Department.

## 2018-02-12 NOTE — ED PROVIDER NOTES
HPI Comments: 70-year-old male patient with a history of tongue cancer and tracheostomy presents to the emergency department via EMS after a witnessed syncopal episode secondary to respiratory distress. Family bedside states patient was recently discharged from the emergency department and on his way home. .  When patient attempted to ambulate to his residence, he became very short of breath and lost consciousness for approximately 2-3 minutes. Bystander state patient's lips appeared cyanotic, his pulse was weekend and he was completely unresponsive for at least 2 minutes. Family states inner canula was removed and he began breathing easier. He woke shortly thereafter. EMS responders recommended patient be evaluated further in the emergency department. Patient is nonverbal secondary to surgical changes in his neck and throat secondary to cancer. He has also undergone radiation. Visit earlier today was secondary to increased swelling in the lips and face which is improved per family's report. He received a dose of his steroids prior to discharge and instructions for close outpatient follow-up with oncology. Patient is a 79 y.o. male presenting with respiratory distress syndrome. The history is provided by the patient. Respiratory Distress   This is a new problem. The problem occurs rarely. The current episode started 1 to 2 hours ago. The problem has been resolved. Associated symptoms include sputum production and syncope. Pertinent negatives include no fever, no headaches, no coryza, no rhinorrhea, no sore throat, no swollen glands, no ear pain, no neck pain, no cough, no hemoptysis, no wheezing, no PND, no orthopnea, no chest pain, no vomiting, no abdominal pain, no rash, no leg pain, no leg swelling and no claudication. Treatments tried: removal of inner cannula, suctioning, supplement oxygen. The treatment provided significant relief. He has had prior hospitalizations. He has had prior ED visits.  He has had prior ICU admissions. Past Medical History:   Diagnosis Date    Airway obstruction 4/20/2016    Allergic rhinitis     Anxiety 3/24/2016    BPH (benign prostatic hypertrophy)     Cerumen impaction     Chronic otitis media     Chronic pharyngitis     Condyloma acuminatum     Dyslipidemia 3/24/2016    Dyspnea 4/8/2016    ETD (eustachian tube dysfunction)     Former smoker     GERD (gastroesophageal reflux disease)     at times    History of condyloma acuminatum     on tongue    History of parotid cancer      History of sinus cancer 1989    Hypercholesterolemia 9/14/2016    Hypertension     Hypotension 4/21/2016    Hypothyroidism      secondary to radiation to neck    Ill-defined condition     left face twitching    Impotence of organic origin     Male stress incontinence 12/23/2014    Malignant neoplasm of head, face, and neck ([address] -- prior history.     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HCC)     Perforation of tympanic membrane     Personal history of prostate cancer     Prostate cancer ([address]     diagnosed 2/24/11    SOB (shortness of breath)     Stridor 3/24/2016    Tracheal mass -- reproted sub-glotic 3/25/2016    Tracheal obstruction 3/24/2016    Tracheostomy in place [facility]) 6/20/2016    Vocal cord paralysis 4/20/2016       Past Surgical History:   Procedure Laterality Date    HX GI      esophageal dilation    HX HEENT  2009    Removal lymph nodes/saliva glands and radiation/chemo    HX HEENT  2010    left total parotidectomy & bilateral radical neck dissection    HX MYRINGOTOMY Bilateral 2010, 2011, 2012 2010-Dr. Angelia Vaz  5/2011     Dr. Delmer Patton in [place]. [address] [facility] [place] UNLISTED  1989    lymph nodes removed         Family History:   Problem Relation Age of Onset    Hypertension Mother     Cancer Mother      breast    Hypertension Father     Heart Disease Father        Social History     Social History    Marital status: SINGLE     Spouse name: N/A    Number of children: N/A    Years of education: N/A     Occupational History    [employer]      11 years     Social History Main Topics    Smoking status: Former Smoker     Packs/day: 0.25     Years: 8.00     Types: Cigarettes     Quit date: 4/20/1976    Smokeless tobacco: Never Used      Comment: Quit in the 1970s    Alcohol use No    Drug use: No    Sexual activity: Not on file     Other Topics Concern    Not on file     Social History Narrative    Lives at home and his sister and son live with him. ALLERGIES: Ativan [lorazepam]; Levofloxacin; Other plant, animal, environmental; and Sulfa (sulfonamide antibiotics)    Review of Systems   Constitutional: Negative for chills, diaphoresis and fever. HENT: Negative for congestion, ear pain, rhinorrhea, sneezing and sore throat. Eyes: Negative for visual disturbance. Respiratory: Positive for sputum production. Negative for cough, hemoptysis, chest tightness, shortness of breath and wheezing. Cardiovascular: Positive for syncope. Negative for chest pain, orthopnea, claudication, leg swelling and PND. Gastrointestinal: Negative for abdominal pain, blood in stool, diarrhea, nausea and vomiting. Endocrine: Negative for polyuria. Genitourinary: Negative for difficulty urinating, dysuria, flank pain, hematuria and urgency. Musculoskeletal: Negative for back pain, myalgias, neck pain and neck stiffness. Skin: Negative for color change and rash. Neurological: Negative for dizziness, syncope, speech difficulty, weakness, light-headedness, numbness and headaches. Psychiatric/Behavioral: Negative for behavioral problems.    All other systems reviewed and are negative. Vitals:    02/11/18 2121 02/11/18 2143 02/11/18 2216   BP: (!) 156/100     Pulse: (!) 119     Resp: 18     Temp: 98 °F (36.7 °C)     SpO2: 100% 100% 100%   Weight: 58.1 kg (128 lb)     Height: 5' 6\" (1.676 m)              Physical Exam   Constitutional: He is oriented to person, place, and time. He appears well-developed and well-nourished. No distress. Alert and oriented to person, place and time. No acute distress. Speaks in clear, fluent sentences. HENT:   Head: Normocephalic and atraumatic. Right Ear: External ear normal.   Left Ear: External ear normal.   Nose: Nose normal.   Mouth/Throat: Oropharynx is clear and moist.   Eyes: Conjunctivae and EOM are normal. Pupils are equal, round, and reactive to light. Neck: Normal range of motion. Neck supple. No JVD present. No tracheal deviation present. Cardiovascular: Regular rhythm, S1 normal, S2 normal, normal heart sounds and intact distal pulses. Tachycardia present. Exam reveals no gallop, no distant heart sounds and no friction rub. No murmur heard. Pulmonary/Chest: Effort normal. No accessory muscle usage or stridor. No tachypnea and no bradypnea. No respiratory distress. He has decreased breath sounds. He has no wheezes. He has no rhonchi. He has no rales. He exhibits no tenderness. There is no respiratory distress at this time. Lungs are coarse throughout without focal findings. Abdominal: Soft. Normal appearance. He exhibits no distension and no mass. There is no hepatosplenomegaly, splenomegaly or hepatomegaly. There is no tenderness. There is no rigidity, no rebound, no guarding, no CVA tenderness, no tenderness at McBurney's point and negative Martinez's sign. PEG tube in place   Musculoskeletal: Normal range of motion. He exhibits no edema, tenderness or deformity. Neurological: He is alert and oriented to person, place, and time. No cranial nerve deficit. Skin: Skin is warm and dry. No rash noted.  He is not diaphoretic. Psychiatric: He has a normal mood and affect. His behavior is normal.   Nursing note and vitals reviewed. MDM  Number of Diagnoses or Management Options  Elevated troponin I level: new and requires workup  Tracheostomy obstruction [facility]): new and requires workup  Diagnosis management comments: eKG obtained on arrival shows a sinus tachycardia with a rate of 115. There are occasional PVCs present. No evidence of acute ischemia. Patient feeling much better after removal of inner cannula and suctioning by respiratory therapist. Patient will have humidified O2 placed to help with dried secretions. Family states he has not been using his humidified oxygen at home for several months. They have noted thickened secretions at home as well. Suspect this may have caused patient's mucous plug formation this evening. Troponin is elevated with comparison to previous troponin evaluation this afternoon. Patient's initial troponin was in the upper limits of normal but is now in the abnormal range. There is no evidence of acute ischemia on patient's EKG and I suspect his troponin elevation is secondary to tachycardia, however will obtain CT PE to rule out clot given cancer hx. CT imaging shows no evidence of pulmonary embolism. Isolated troponin elevation noted, review the patient's chart reveals previous elevations in troponin levels as well. We will repeat this lab test to ensure that  It does not continue to elevate. No evidence of ischemia on patient's EKG. Repeat trop increased from 0.20 to 0.31  Spoke with on call cardiologist concerning troponin elevation, request CKMB level and third troponin with Echo in the morning. Does not feel patient requires admission at this time. 1:56 AM  Pt remains vitally stable on RA, no SOB/CP reported. Spoke with hospitalist team who feel patient can remain in ED for repeat testing as requested by cardiology.    Discussed plan for repeat troponin and echocardiography with patient and family at bedside.        Amount and/or Complexity of Data Reviewed  Clinical lab tests: ordered and reviewed  Tests in the radiology section of CPT®: ordered and reviewed  Tests in the medicine section of CPT®: ordered and reviewed  Review and summarize past medical records: yes  Discuss the patient with other providers: yes  Independent visualization of images, tracings, or specimens: yes    Risk of Complications, Morbidity, and/or Mortality  Presenting problems: moderate  Diagnostic procedures: low  Management options: moderate    Patient Progress  Patient progress: stable        ED Course       Procedures

## 2018-02-12 NOTE — ED NOTES
Report from Fidencio Swain, [facility]. Care assumed at this time.  This RN also just spoke with Echo, who states the Echo techs do not come in until 0730 and that she will have them look into when pt can have his echo

## 2018-02-12 NOTE — CONSULTS
[facility] Cardiology Consult                Date of  Admission: 2/11/2018  9:18 PM     Primary Care Physician: Dr. Jenny Hanson  Primary Cardiologist: MICHELINE  Referring Physician: ED  Consulting Physician: Dr. Aster Dixon    CC/Reason for consult:elevated troponin      Lotus Gabriel is a 79 y.o. male with prior h/o HTN, HLP prostate cancer, and recurrent cancer of tongue s/p surgery/radiation seed implants (prior left submandibular gland cancer in 2009) and s/p tracheostomy with PEG. Last echo at [facility] 4/17 showed preserved LV function mild TR. Patient presented to ED at [facility] with witnessed syncopal episode secondary to respiratory distress. Patient was recently discharged yesterday from ED after increased swelling of lips and face. He received steroids and d/c home. While walking to his residence he become SOB and ?LOC. Bystander states \"lips blue\". Family was with patient during the syncopal episode and he was suctioned with resolution of his sx. EMS arrived and transported to ED for further evaluation. In ED, he was suctioned again as well and felt better. Likely mucous plug per ED. In ED, labs showed elevated troponin (.20 to .46), WBC 12.3,  CT chest negative for PE but multiple sclerotic foci throughout the thoracic spine consistent with osteoblastic metastatic disease. EKG with ST and NSST changes but no acute ischemia, and echo showed preserved LV function with no WMA. Patient denies any chest pain and his SOB has resolved. Diagnosis    Hypothyroidism    Male stress incontinence    Condyloma acuminatum    Impotence of organic origin    GERD (gastroesophageal reflux disease)    Malignant neoplasm of prostate ([address]    Malignant neoplasm of head, face, and neck ([address] -- prior history.    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HCC)    Dyslipidemia    Anxiety    Stridor    Tracheal obstruction    Tracheal mass -- reproted sub-glotic    Hypertension    Dyspnea    Airway obstruction    Vocal cord paralysis    Hypotension    Tracheostomy in place ([address]    Hypercholesterolemia    Ill-defined condition    Chronic otitis media    Perforation of tympanic membrane    Chronic pharyngitis    ETD (eustachian tube dysfunction)    Mixed hearing loss    Malignant tumor of parotid gland (HCC)    Essential hypertension with goal blood pressure less than 130/85    Pleural effusion    Mediastinal lymphadenopathy       Past Medical History:   Diagnosis Date    Airway obstruction 4/20/2016    Allergic rhinitis     Anxiety 3/24/2016    BPH (benign prostatic hypertrophy)     Cerumen impaction     Chronic otitis media     Chronic pharyngitis     Condyloma acuminatum     Dyslipidemia 3/24/2016    Dyspnea 4/8/2016    ETD (eustachian tube dysfunction)     Former smoker     GERD (gastroesophageal reflux disease)     at times    History of condyloma acuminatum     on tongue    History of parotid cancer      History of sinus cancer 1989    Hypercholesterolemia 9/14/2016    Hypertension     Hypotension 4/21/2016    Hypothyroidism      secondary to radiation to neck    Ill-defined condition     left face twitching    Impotence of organic origin     Male stress incontinence 12/23/2014    Malignant neoplasm of head, face, and neck ([address] -- prior history.     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HCC)     Perforation of tympanic membrane     Personal history of prostate cancer     Prostate cancer ([address]     diagnosed 2/24/11    SOB (shortness of breath)     Stridor 3/24/2016    Tracheal mass -- reproted sub-glotic 3/25/2016    Tracheal obstruction 3/24/2016    Tracheostomy in place [facility]) 6/20/2016    Vocal cord paralysis 4/20/2016      Past Surgical History:   Procedure Laterality Date    HX GI      esophageal dilation    HX HEENT  2009    Removal lymph nodes/saliva glands and radiation/chemo    HX HEENT  2010    left total parotidectomy & bilateral radical neck dissection    HX MYRINGOTOMY Bilateral 2010, 2011, 2012 2010-Dr. Chris Patel  5/2011     Dr. Yuri Glass in [place]    lymph nodes removed     Allergies   Allergen Reactions    Ativan [Lorazepam] Other (comments)     Tremors    Levofloxacin Other (comments)     Muscle cramps/pain    Other Plant, Animal, Environmental Unknown (comments)     Sulfate      Sulfa (Sulfonamide Antibiotics) Shortness of Breath and Other (comments)      Family History   Problem Relation Age of Onset    Hypertension Mother     Cancer Mother      breast    Hypertension Father     Heart Disease Father         No current facility-administered medications for this encounter. Current Outpatient Prescriptions   Medication Sig    mirtazapine (REMERON) 15 mg tablet Take 1 Tab by mouth nightly.  methylphenidate HCl (RITALIN) 5 mg tablet Take 1 Tab (5 mg total) by mouth two (2) times a day. DO NOT TAKE after 2PM Max Daily Amount: 10 mg    Omeprazole delayed release (PRILOSEC D/R) 20 mg tablet Take 20 mg by mouth daily.  fentaNYL (DURAGESIC) 50 mcg/hr PATCH 1 Patch by TransDERmal route every seventy-two (72) hours. Max Daily Amount: 1 Patch.  HYDROcodone-acetaminophen (NORCO)  mg tablet Take 1 Tab by mouth every eight (8) hours as needed for Pain. Max Daily Amount: 3 Tabs.  sertraline (ZOLOFT) 50 mg tablet Take 1 Tab by mouth daily.  dexamethasone (DECADRON) 4 mg tablet Take 1 Tab by mouth two (2) times daily (with meals). Take twice daily as needed while lips swollen.  doxycycline (ADOXA) 100 mg tablet Take 1 Tab by mouth daily.  dexamethasone (DECADRON) 1 mg tablet Take 1 Tab by mouth Daily (before breakfast).     ondansetron hcl (ZOFRAN) 8 mg tablet Take 1 Tab by mouth every eight (8) hours as needed for Nausea.  levothyroxine (SYNTHROID) 100 mcg tablet Take 137 mcg by mouth Daily (before breakfast).  clonazePAM (KLONOPIN) 0.5 mg tablet Take 0.5 mg by mouth three (3) times daily as needed. Indications: rarely uses       Review of Systems   Constitution: Positive for weakness. Negative for diaphoresis and malaise/fatigue. HENT: Negative for congestion. Cardiovascular: Positive for syncope. Negative for chest pain, claudication, cyanosis, dyspnea on exertion, irregular heartbeat, leg swelling, near-syncope, orthopnea, palpitations and paroxysmal nocturnal dyspnea. Respiratory: Positive for shortness of breath. Negative for cough and wheezing. Endocrine: Negative for cold intolerance and heat intolerance. Hematologic/Lymphatic: Does not bruise/bleed easily. Skin: Negative for nail changes. Neurological: Negative for dizziness and headaches.         Physical Exam  Vitals:    02/12/18 0814 02/12/18 0827 02/12/18 0832 02/12/18 0846   BP: (!) 140/94  (!) 159/93 146/86   Pulse:       Resp:       Temp:       SpO2: 98% 98% 99% 98%   Weight:       Height:           Physical Exam:  General:  No Acute Distress, follows commands but no verbal communication d/t trach  HEENT: pupils equal and round, no abnormalities noted  Neck: supple, no JVD, no carotid bruits  Heart: S1S2 with RRR without murmurs or gallops  Lungs: Clear throughout auscultation bilaterally without adventitious sounds  Abd: soft, nontender, nondistended, with good bowel sounds  Ext: warm, no edema, calves supple/nontender, pulses 2+ bilaterally  Skin: warm and dry  Psychiatric: Normal mood and affect  Neurologic: Alert and oriented X 3    Cardiographics    Telemetry: ST 100s  ECG: ST vent rate 115 with NSST changes  Echocardiogram: read by Dr. Bisi Clifton preserved LV function with no WMA    Labs:   Recent Labs      02/11/18 2129  02/11/18   1442   NA 137  139   K  4.1  4.3   BUN  20  19   CREA  0.98  0.73*   GLU  276*  92   WBC  12.3*  8.1   HGB  14.9  13.9   HCT  44.8  41.9   PLT  362  318        Assessment/Plan:     Assessment:      Principal Problem:    Elevated troponin (2/12/2018)- demand ischemia; secondary to hypoxia from mucous plug. Echo today with preserved LV function and no WMA. Dr. Roland Em discussed addign medical therapy and wife in agreement. Patient denies any active angina sx. Will add asa 81 mg daily and lopressor 25 mg bid. Will see Dr. Rayne Joiner on 3/6/18 at [phone] am in [place] office    Active Problems:    Malignant neoplasm of head, face, and neck [address] -- prior history. ()      Hypoxia (2/12/2018)- resolved      Syncope (2/12/2018)- secondary to mucous plug/hypoxia. Thank you very much for this referral. We appreciate the opportunity to participate in this patient's care. We will follow along with above stated plan.     Noemy Flores NP  Consulting MD: Dr. Roland Em

## 2018-02-12 NOTE — ED NOTES
I have reviewed discharge instructions with the patient and caregiver. The patient and caregiver verbalized understanding. Patient left ED via Discharge Method: ambulatory to Home with self transport. The patient is wheeled to car via nurse and appears in no acute distress. The patient has been provided discharge instructions and follow up information. The patient and sister do not have any questions at this time. Opportunity for questions and clarification provided. Patient given 0 scripts. To continue your aftercare when you leave the hospital, you may receive an automated call from our care team to check in on how you are doing. This is a free service and part of our promise to provide the best care and service to meet your aftercare needs.  If you have questions, or wish to unsubscribe from this service please call [phone]. Thank you for Choosing our [facility].

## 2018-02-12 NOTE — ED TRIAGE NOTES
EMS states \"Family states they were getting patient out of the car from leaving the hospital and he had a syncopal episode. States they took him in and suctioned and replaced his trach and he was doing a little better but states he was still altered for him.   Patient was just discharged from the ER\"

## 2018-02-12 NOTE — DISCHARGE INSTRUCTIONS
Coping With Your Emotions When You Have Cancer: Care Instructions  Your Care Instructions    Finding out you have cancer can cause a flood of emotions. You may feel angry, sad, or powerless. Emotions can be overwhelming to you and your loved ones. This time in your life may feel dark and hopeless. However, many people survive and even thrive with cancer. Some types of cancer can be treated and cured. There are many treatments to control pain and improve your quality of life. Even if you cannot be cured, you do not have to suffer. It is important to know that it is normal to have all of these emotions, or none of them. Everyone reacts differently. And your feelings may change often, without warning. It is common to go through any or all of these feelings:  · Anger, fear, or worry. · Not believing that you have cancer. · Feeling out of control and not able to care for yourself. · Sadness, guilt, or loneliness. · No hope for the future. Follow-up care is a key part of your treatment and safety. Be sure to make and go to all appointments, and call your doctor if you are having problems. It's also a good idea to know your test results and keep a list of the medicines you take. How can you care for yourself at home? · Cancer can be very unpredictable. Learning to live with uncertainty is part of living with cancer. · You may have some false ideas about how you \"should\" feel when you have cancer. Although some people with cancer suffer from depression, not all do. · If you are depressed, talk to your doctor and get treatment. It will help you to feel better and focus on making good health decisions. Symptoms of depression include:  ¨ You feel helpless or hopeless. ¨ You lose interest in being with family or friends. ¨ You lose interest in hobbies or activities you once enjoyed. ¨ You do not feel hungry. ¨ You cry a lot, or for long periods of time.   ¨ You have trouble sleeping, or you sleep too much or too little. ¨ You think about killing yourself, or you make plans or take action to kill yourself. · Share your feelings with someone you can trust. It is okay to feel angry and frustrated. You will feel better if you can share these feelings with someone. Do not pretend to be cheerful if you are not. · Know which family members or friends you can turn to for support. Find a good listener. You do not always want advice. · Find a support group for people who have cancer. A support group can be a safe and comfortable place to talk about your illness. · Let yourself grieve. But when symptoms get in the way of your ability to carry on with daily activities, talk to your doctor. · Talk to your doctor if you are thinking about using any over-the-counter or herbal supplements. Some of them may not be safe if used with certain other medicines. · Keep the numbers for these national suicide hotlines: 1-169-039-TALK (0-995.361.7618) and 7-791-RNYXYXR (2-768.987.8446). If you or someone you know talks about suicide or feeling hopeless, get help right away. When should you call for help? Call 911 anytime you think you may need emergency care. For example, call if:  ? · You feel like hurting yourself or someone else. ? · Someone you know has depression and is about to attempt or is attempting suicide. ? Watch closely for changes in your health, and be sure to contact your doctor if:  ? · You feel very sad and think you may be depressed. Where can you learn more? Go to http://carol-joanie.info/. Enter J365 in the search box to learn more about \"Coping With Your Emotions When You Have Cancer: Care Instructions. \"  Current as of: May 12, 2017  Content Version: 11.4  © 7033-1655 Healthwise, [employer]. Care instructions adapted under license by [employer] (which disclaims liability or warranty for this information).  If you have questions about a medical condition or this instruction, always ask your healthcare professional. [place] [address] any warranty or liability for your use of this information.

## 2018-02-12 NOTE — ED NOTES
Patient being housed in the department until morning to obtain an echocardiogram at cardiology's request.  Patient remained asymptomatic throughout his stay. It appeared that the altered elevation in troponin may have been related to the apneic episode that occurred at home. There was reports of cyanosis and decreased level of consciousness. Due to his comorbidities intervention is unlikely at this time. Cardiology is coming to see the patient.

## 2018-02-14 NOTE — PROGRESS NOTES
Arrived to the [facility] via [facility] with his sister. erbitux completed. Patient tolerated well. Any issues or concerns during appointment: no.  Patient aware of next infusion appointment on 2/28 at 1315. Discharged to home via [facility].

## 2018-02-22 NOTE — IP AVS SNAPSHOT
[facility] 
 
 
 [address] 
[phone] Patient: Toy Naranjo MRN: [id number] AS About your hospitalization You were admitted on:  2018 You last received care in the:  [facility] 8 MED SURG You were discharged on:  2018 Why you were hospitalized Your primary diagnosis was:  Community Acquired Pneumonia Of Right Upper Lobe Of Lung (Hcc) Your diagnoses also included:  Laryngeal Cancer (Hcc), Hypothyroidism, Gerd (Gastroesophageal Reflux Disease), Tracheal Obstruction, Tracheostomy Dependence (Hcc), Constipation, Hemoptysis Follow-up Information Follow up With Details Comments Contact Info Karen Olmos MD On 3/12/2018 1:45 PM [facility] [address] [address] 
[phone] Belinda Madsen MD On 3/12/2018 10:20 AM [facility] [address] [facility] [address] 
[phone] Manny Crowe MD On 3/14/2018 1:00 PM Lab. 1:30 PM Office. [address] [facility] [address] [facility] [address] 
[phone] Health   Health  to follow after discharge. [phone] [address] [place] [address] 
[phone] Your Scheduled Appointments 2018 10:20 AM EDT Office Visit with Belinda Madsen MD  
[address] ENT 8001 [facility]) [address]  
[phone] 2018  1:00 PM EDT  
LAB with [address]  
1467 [facility] OUTREACH INSURANCE [address]) Tanya Alfonso [address] [place] [address]  
[phone] 2018  1:30 PM EDT Follow Up with Manny Crowe MD  
[facility] Hematology and Oncology [facility]) STEVEN/ Yariel Da Silva [address] [facility] [address]  
[phone] 2018  2:00 PM EDT Chemo with Flores Chappell [address] ([address] Josue [employer] [phone] [facility] [address]  
[phone] [address] Discharge Orders None A check og indicates which time of day the medication should be taken. My Medications START taking these medications Instructions Each Dose to Equal  
 Morning Noon Evening Bedtime  
 gabapentin 100 mg capsule Commonly known as:  NEURONTIN Your next dose is: This evening 1 Cap by Per G Tube route three (3) times daily. 100 mg  
    
  
   
   
  
   
  
  
 levoFLOXacin 750 mg tablet Commonly known as:  Parul Brook Your next dose is: This evening Take 1 Tab by mouth every twenty-four (24) hours for 14 days. 750 mg  
    
   
   
  
   
  
 polyethylene glycol 17 gram packet Commonly known as:  Dhruv Roller Start taking on:  3/8/2018 Your next dose is:  Tomorrow Morning Take 1 Packet by mouth daily. 17 g  
    
  
   
   
   
  
 predniSONE 10 mg tablet Commonly known as:  Antoinette Grover Your last dose was: Tomorrow Morning Take 3 tablets daily for 3 days then, take 2 tablets daily for 3 days then, take 1 tablet daily for 3 days then, take 1/2 tablet daily for 3 days then stop. CONTINUE taking these medications Instructions Each Dose to Equal  
 Morning Noon Evening Bedtime  
 aspirin 81 mg chewable tablet Your next dose is:  Tomorrow Morning Take 1 Tab by mouth daily. 81 mg  
    
  
   
   
   
  
 clonazePAM 0.5 mg tablet Commonly known as:  Marce Moeller Your last dose was:  3/7 at 8:21 am  
   
 Take 0.5 mg by mouth three (3) times daily as needed. Indications: rarely uses 0.5 mg  
    
   
   
   
  
 fentaNYL 50 mcg/hr PATCH Commonly known as:  Raiza Carbo Your last dose was:  3/2/18 1 Patch by TransDERmal route every seventy-two (72) hours. Max Daily Amount: 1 Patch. 1 Patch HYDROcodone-acetaminophen  mg tablet Commonly known as:  Janet  Your last dose was:  3/7 at 1:15 am  
   
 Take 1 Tab by mouth every eight (8) hours as needed for Pain. Max Daily Amount: 3 Tabs. 1 Tab  
    
   
   
   
  
 levothyroxine 100 mcg tablet Commonly known as:  SYNTHROID Your next dose is:  Tomorrow Morning Take 137 mcg by mouth Daily (before breakfast). 137 mcg  
    
  
   
   
   
  
 metoprolol tartrate 25 mg tablet Commonly known as:  LOPRESSOR Your next dose is: This evening Take 1 Tab by mouth two (2) times a day. 25 mg  
    
  
   
   
  
   
  
 mirtazapine 15 mg tablet Commonly known as:  Doc Al Your next dose is: Take tonight Take 1 Tab by mouth nightly. 15 mg Omeprazole delayed release 20 mg tablet Commonly known as:  PRILOSEC D/R Your next dose is:  Tomorrow Morning Take 20 mg by mouth daily. 20 mg  
    
  
   
   
   
  
 ondansetron hcl 8 mg tablet Commonly known as:  Normie Brisk Take 1 Tab by mouth every eight (8) hours as needed for Nausea. 8 mg  
    
   
   
   
  
 sertraline 50 mg tablet Commonly known as:  ZOLOFT Your next dose is:  Tomorrow Morning Take 1 Tab by mouth daily. 50 mg  
    
  
   
   
   
  
  
STOP taking these medications   
 amoxicillin-clavulanate 600-42.9 mg/5 mL suspension Commonly known as:  AUGMENTIN ES-600  
   
  
 dexamethasone 4 mg tablet Commonly known as:  DECADRON  
   
  
 doxycycline 100 mg tablet Commonly known as:  ADOXA Where to Get Your Medications Information on where to get these meds will be given to you by the nurse or doctor. ! Ask your nurse or doctor about these medications  
  gabapentin 100 mg capsule  
 levoFLOXacin 750 mg tablet polyethylene glycol 17 gram packet  
 predniSONE 10 mg tablet Discharge Instructions Followup/Outpt Studies: 
--Will follow up appointment with [facility] Pulmonary if needed. --Continue Levaquin for 14 more days to complete 21 day course. --Follow up with oncology in 1 week. --Follow up with Dr. Chip Deal, ENT, this week. --Will continue trach collar, home health nurse and PT and aides to assist with his care at home. Pneumonia: Care Instructions Your Care Instructions Pneumonia is an infection of the lungs. Most cases are caused by infections from bacteria or viruses. Pneumonia may be mild or very severe. If it is caused by bacteria, you will be treated with antibiotics. It may take a few weeks to a few months to recover fully from pneumonia, depending on how sick you were and whether your overall health is good. Follow-up care is a key part of your treatment and safety. Be sure to make and go to all appointments, and call your doctor if you are having problems. It's also a good idea to know your test results and keep a list of the medicines you take. How can you care for yourself at home? · Take your antibiotics exactly as directed. Do not stop taking the medicine just because you are feeling better. You need to take the full course of antibiotics. · Take your medicines exactly as prescribed. Call your doctor if you think you are having a problem with your medicine. · Get plenty of rest and sleep. You may feel weak and tired for a while, but your energy level will improve with time. · To prevent dehydration, drink plenty of fluids, enough so that your urine is light yellow or clear like water. Choose water and other caffeine-free clear liquids until you feel better. If you have kidney, heart, or liver disease and have to limit fluids, talk with your doctor before you increase the amount of fluids you drink. · Take care of your cough so you can rest. A cough that brings up mucus from your lungs is common with pneumonia. It is one way your body gets rid of the infection. But if coughing keeps you from resting or causes severe fatigue and chest-wall pain, talk to your doctor. He or she may suggest that you take a medicine to reduce the cough. · Use a vaporizer or humidifier to add moisture to your bedroom. Follow the directions for cleaning the machine. · Do not smoke or allow others to smoke around you. Smoke will make your cough last longer. If you need help quitting, talk to your doctor about stop-smoking programs and medicines. These can increase your chances of quitting for good. · Take an over-the-counter pain medicine, such as acetaminophen (Tylenol), ibuprofen (Advil, Motrin), or naproxen (Aleve). Read and follow all instructions on the label. · Do not take two or more pain medicines at the same time unless the doctor told you to. Many pain medicines have acetaminophen, which is Tylenol. Too much acetaminophen (Tylenol) can be harmful. · If you were given a spirometer to measure how well your lungs are working, use it as instructed. This can help your doctor tell how your recovery is going. · To prevent pneumonia in the future, talk to your doctor about getting a flu vaccine (once a year) and a pneumococcal vaccine (one time only for most people). When should you call for help? Call 911 anytime you think you may need emergency care. For example, call if: 
? · You have severe trouble breathing. ?Call your doctor now or seek immediate medical care if: 
? · You cough up dark brown or bloody mucus (sputum). ? · You have new or worse trouble breathing. ? · You are dizzy or lightheaded, or you feel like you may faint. ? Watch closely for changes in your health, and be sure to contact your doctor if: 
? · You have a new or higher fever. ? · You are coughing more deeply or more often. ? · You are not getting better after 2 days (48 hours). ? · You do not get better as expected. Where can you learn more? Go to http://carol-joanie.info/. Enter [id number] in the search box to learn more about \"Pneumonia: Care Instructions. \" Current as of: May 12, 2017 Content Version: 11.4 © 9338-9535 [employer]. Care instructions adapted under license by [employer] (which disclaims liability or warranty for this information). If you have questions about a medical condition or this instruction, always ask your healthcare professional. [place] [address] any warranty or liability for your use of this information. DISCHARGE SUMMARY from Nurse PATIENT INSTRUCTIONS: 
 
After general anesthesia or intravenous sedation, for 24 hours or while taking prescription Narcotics: · Limit your activities · Do not drive and operate hazardous machinery · Do not make important personal or business decisions · Do  not drink alcoholic beverages · If you have not urinated within 8 hours after discharge, please contact your surgeon on call. Report the following to your surgeon: 
· Excessive pain, swelling, redness or odor of or around the surgical area · Temperature over 100.5 · Nausea and vomiting lasting longer than 4 hours or if unable to take medications · Any signs of decreased circulation or nerve impairment to extremity: change in color, persistent  numbness, tingling, coldness or increase pain · Any questions What to do at Home: *  Please give a list of your current medications to your Primary Care Provider. *  Please update this list whenever your medications are discontinued, doses are 
    changed, or new medications (including over-the-counter products) are added. *  Please carry medication information at all times in case of emergency situations. These are general instructions for a healthy lifestyle: No smoking/ No tobacco products/ Avoid exposure to second hand smoke Surgeon General's Warning:  Quitting smoking now greatly reduces serious risk to your health. Obesity, smoking, and sedentary lifestyle greatly increases your risk for illness A healthy diet, regular physical exercise & weight monitoring are important for maintaining a healthy lifestyle You may be retaining fluid if you have a history of heart failure or if you experience any of the following symptoms:  Weight gain of 3 pounds or more overnight or 5 pounds in a week, increased swelling in our hands or feet or shortness of breath while lying flat in bed. Please call your doctor as soon as you notice any of these symptoms; do not wait until your next office visit. Recognize signs and symptoms of STROKE: 
 
F-face looks uneven A-arms unable to move or move unevenly S-speech slurred or non-existent T-time-call 911 as soon as signs and symptoms begin-DO NOT go Back to bed or wait to see if you get better-TIME IS BRAIN. Warning Signs of HEART ATTACK Call 911 if you have these symptoms: 
? Chest discomfort. Most heart attacks involve discomfort in the center of the chest that lasts more than a few minutes, or that goes away and comes back. It can feel like uncomfortable pressure, squeezing, fullness, or pain. ? Discomfort in other areas of the upper body. Symptoms can include pain or discomfort in one or both arms, the back, neck, jaw, or stomach. ? Shortness of breath with or without chest discomfort. ? Other signs may include breaking out in a cold sweat, nausea, or lightheadedness. Don't wait more than five minutes to call [address]! Fast action can save your life. Calling 911 is almost always the fastest way to get lifesaving treatment. Emergency Medical Services staff can begin treatment when they arrive  up to an hour sooner than if someone gets to the hospital by car. The discharge information has been reviewed with the patient. The patient verbalized understanding. Discharge medications reviewed with the patient and appropriate educational materials and side effects teaching were provided. ___________________________________________________________________________________________________________________________________ [employer] Announcement We are excited to announce that we are making your provider's discharge notes available to you in [employer]. You will see these notes when they are completed and signed by the physician that discharged you from your recent hospital stay. If you have any questions or concerns about any information you see in [employer], please call the Health Information Department where you were seen or reach out to your Primary Care Provider for more information about your plan of care. Introducing [facility]! [facility] introduces [employer] patient portal. Now you can access parts of your medical record, email your doctor's office, and request medication refills online. 1. In your internet browser, go to [email or web address] 2. Click on the First Time User? Click Here link in the Sign In box. You will see the New Member Sign Up page. 3. Enter your [employer] Access Code exactly as it appears below. You will not need to use this code after youve completed the sign-up process. If you do not sign up before the expiration date, you must request a new code. · [employer] Access Code: [email or web address] Expires: 3/26/2018  2:12 PM 
 
4. Enter the last four digits of your Social Security Number (xxxx) and Date of Birth (mm/dd/yyyy) as indicated and click Submit. You will be taken to the next sign-up page. 5. Create a [employer] ID. This will be your MyChart login ID and cannot be changed, so think of one that is secure and easy to remember. 6. Create a New.nett password. You can change your password at any time. 7. Enter your Password Reset Question and Answer. This can be used at a later time if you forget your password. 8. Enter your e-mail address. You will receive e-mail notification when new information is available in [address] 9. Click Sign Up. You can now view and download portions of your medical record. 10. Click the Download Summary menu link to download a portable copy of your medical information. If you have questions, please visit the Frequently Asked Questions section of the [employer] website. Remember, DGIT is NOT to be used for urgent needs. For medical emergencies, dial 911. Now available from your iPhone and Android! Providers Seen During Your Hospitalization Provider Specialty Primary office phone Adonay Markham MD Emergency Medicine [phone] Corrine Martin MD Pulmonary Disease [phone] Your Primary Care Physician (PCP) Primary Care Physician Office Phone Office Fax [facility] [phone] You are allergic to the following Allergen Reactions Ativan (Lorazepam) Other (comments) Tremors Levofloxacin Other (comments) Muscle cramps/pain Other Plant, Animal, Environmental Unknown (comments) Sulfate Sulfa (Sulfonamide Antibiotics) Shortness of Breath Other (comments) Recent Documentation Height Weight BMI Smoking Status 1.676 m 57.2 kg 20.34 kg/m2 Former Smoker Emergency Contacts Name Discharge Info Relation Home Work Mobile [address] [facility] CAREGIVER [3] Sister [23] [phone] [facility] CAREGIVER [3] Daughter [21] [phone] Martha Sanchez [3] [phone] Patient Belongings  The following personal items are in your possession at time of discharge: 
  Dental Appliances: None  Visual Aid: Glasses      Home Medications: None Jewelry: None  Clothing: Footwear, Dress    Other Valuables: None Please provide this summary of care documentation to your next provider. Signatures-by signing, you are acknowledging that this After Visit Summary has been reviewed with you and you have received a copy. Patient Signature:  ____________________________________________________________ Date:  ____________________________________________________________  
  
[facility] Provider Signature:  ____________________________________________________________ Date:  ____________________________________________________________

## 2018-02-23 PROBLEM — C32.9 LARYNGEAL CANCER (HCC): Status: ACTIVE | Noted: 2018-01-01

## 2018-02-23 PROBLEM — Z93.0 TRACHEOSTOMY DEPENDENCE (HCC): Status: ACTIVE | Noted: 2018-01-01

## 2018-02-23 PROBLEM — J18.9 CAP (COMMUNITY ACQUIRED PNEUMONIA): Status: ACTIVE | Noted: 2018-01-01

## 2018-02-23 NOTE — ED TRIAGE NOTES
Pt arrives via EMS for c/o increased altered mental status. Pt with home trach. EMS states pt continues to try and pull trach out. States room air sat 99%. Pt tachycardic at 134.

## 2018-02-23 NOTE — PROGRESS NOTES
Problem: Falls - Risk of  Goal: *Absence of Falls  Document Mario Fall Risk and appropriate interventions in the flowsheet.    Outcome: Progressing Towards Goal  Fall Risk Interventions:            Medication Interventions: Bed/chair exit alarm         History of Falls Interventions: Bed/chair exit alarm

## 2018-02-23 NOTE — PROGRESS NOTES
Pt alert and oriented, resting in bed, family at bedside for support, PEG in place, patent, and dry,  Tracheostomy in place, dry and clean, breathing even and unlabored, pt on 3 liters of Oxygen, safety measures in place, dual skin assessment completed, no skin issues found except scattered scabs in different stages of healing bilaterally in lower legs, call light within reach, IV patent and infusing ABT'S.

## 2018-02-23 NOTE — CONSULTS
[facility] Consult Note    Referring Physician:  Dr Domo Abernathy Date:  2/23/2018    Admit Date:  2/22/2018    Chief Complaint:  Alan Larry PEG tube\"    Subjective:     History of Present Illness:  Patient is a 79 y.o. AAM who is seen in consultation at the request of Dr. Giulia Zambrano for PEG tube assessment. 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 He presented to the hospital for lethargy and agitation. GI was consulted to assess his PEG. He's had no problems with the PEG as it is flushing, not painful, and no issues with tube feeds. I called the nurse on 8th floor to discuss but she was unavailable. He was seen by [facility] GI this fall to assess the tube for which it was working well. No replacement recommended at that time. PMH:  Past Medical History:   Diagnosis Date    Airway obstruction 4/20/2016    Allergic rhinitis     Anxiety 3/24/2016    BPH (benign prostatic hypertrophy)     Cerumen impaction     Chronic otitis media     Chronic pharyngitis     Condyloma acuminatum     Dyslipidemia 3/24/2016    Dyspnea 4/8/2016    ETD (eustachian tube dysfunction)     Former smoker     GERD (gastroesophageal reflux disease)     at times    History of condyloma acuminatum     on tongue    History of parotid cancer      History of sinus cancer 1989    Hypercholesterolemia 9/14/2016    Hypertension     Hypotension 4/21/2016    Hypothyroidism      secondary to radiation to neck    Ill-defined condition     left face twitching    Impotence of organic origin     Male stress incontinence 12/23/2014    Malignant neoplasm of head, face, and neck ([address] -- prior history.     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[facility]     Perforation of tympanic membrane     Personal history of prostate cancer     Prostate cancer ([facility]     diagnosed 2/24/11    SOB (shortness of breath)     Stridor 3/24/2016    Tracheal mass -- reproted sub-glotic 3/25/2016    Tracheal obstruction 3/24/2016    Tracheostomy in place [facility]) 6/20/2016    Vocal cord paralysis 4/20/2016       PSH:  Past Surgical History:   Procedure Laterality Date    HX GI      esophageal dilation    HX HEENT  2009    Removal lymph nodes/saliva glands and radiation/chemo    HX HEENT  2010    left total parotidectomy & bilateral radical neck dissection    HX MYRINGOTOMY Bilateral 2010, 2011, 2012 2010-Dr. Carroll Pavon  5/2011     Dr. Meg Valentine in [place]    lymph nodes removed       Allergies: Allergies   Allergen Reactions    Ativan [Lorazepam] Other (comments)     Tremors    Levofloxacin Other (comments)     Muscle cramps/pain    Other Plant, Animal, Environmental Unknown (comments)     Sulfate      Sulfa (Sulfonamide Antibiotics) Shortness of Breath and Other (comments)       Home Medications:  Prior to Admission medications    Medication Sig Start Date End Date Taking? Authorizing Provider   amoxicillin-clavulanate (AUGMENTIN ES-600) 600-42.9 mg/5 mL suspension Take 21.5 mL by mouth two (2) times a day for 7 days. 2/20/18 2/27/18 Yes Areli Escoto MD   aspirin 81 mg chewable tablet Take 1 Tab by mouth daily. 2/12/18  Yes Shelly Darling NP   metoprolol tartrate (LOPRESSOR) 25 mg tablet Take 1 Tab by mouth two (2) times a day. 2/12/18  Yes Shelly Darling NP   mirtazapine (REMERON) 15 mg tablet Take 1 Tab by mouth nightly. 2/7/18  Yes Jake Perez NP   Omeprazole delayed release (PRILOSEC D/R) 20 mg tablet Take 20 mg by mouth daily. Yes Historical Provider   fentaNYL (DURAGESIC) 50 mcg/hr PATCH 1 Patch by TransDERmal route every seventy-two (72) hours. Max Daily Amount: 1 Patch. 1/31/18  Yes Norleen Fleischer, MD   HYDROcodone-acetaminophen [facility])  mg tablet Take 1 Tab by mouth every eight (8) hours as needed for Pain. Max Daily Amount: 3 Tabs. 1/31/18  Yes Norleen Fleischer, MD   sertraline (ZOLOFT) 50 mg tablet Take 1 Tab by mouth daily. 1/31/18  Yes Karen Prado NP   dexamethasone (DECADRON) 4 mg tablet Take 1 Tab by mouth two (2) times daily (with meals). Take twice daily as needed while lips swollen. 12/19/17  Yes Karen Prado NP   doxycycline (ADOXA) 100 mg tablet Take 1 Tab by mouth daily. 11/30/17  Yes Norleen Fleischer, MD   ondansetron hcl (ZOFRAN) 8 mg tablet Take 1 Tab by mouth every eight (8) hours as needed for Nausea. 11/15/17  Yes Carmen Macias MD   levothyroxine (SYNTHROID) 100 mcg tablet Take 137 mcg by mouth Daily (before breakfast). Yes Historical Provider   clonazePAM (KLONOPIN) 0.5 mg tablet Take 0.5 mg by mouth three (3) times daily as needed.  Indications: rarely uses   Yes Historical Provider       Hospital Medications:  Current Facility-Administered Medications   Medication Dose Route Frequency    aspirin chewable tablet 81 mg  81 mg Oral DAILY    clonazePAM (KlonoPIN) tablet 0.5 mg  0.5 mg Oral BID    HYDROcodone-acetaminophen (NORCO)  mg tablet 2 Tab  2 Tab Oral Q6H PRN    levothyroxine (SYNTHROID) tablet 137 mcg  137 mcg Oral ACB    metoprolol tartrate (LOPRESSOR) tablet 25 mg  25 mg Oral BID    mirtazapine (REMERON) tablet 15 mg  15 mg Oral QHS    sertraline (ZOLOFT) tablet 50 mg  50 mg Oral DAILY    sodium chloride (NS) flush 5-10 mL  5-10 mL IntraVENous Q8H    sodium chloride (NS) flush 5-10 mL  5-10 mL IntraVENous PRN    azithromycin (ZITHROMAX) tablet 500 mg  500 mg Oral Q24H    cefTRIAXone (ROCEPHIN) 1 g in 0.9% sodium chloride (MBP/ADV) 50 mL  1 g IntraVENous Q24H    enoxaparin (LOVENOX) injection 40 mg  40 mg SubCUTAneous Q24H    budesonide (PULMICORT) 500 mcg/2 ml nebulizer suspension  500 mcg Nebulization BID RT    methylPREDNISolone (PF) (SOLU-MEDROL) injection 40 mg  40 mg IntraVENous Q6H    dextrose 5% and 0.9% NaCl infusion  100 mL/hr IntraVENous CONTINUOUS       Social History:  Social History   Substance Use Topics    Smoking status: Former Smoker     Packs/day: 0.25     Years: 8.00     Types: Cigarettes     Quit date: 4/20/1976    Smokeless tobacco: Never Used      Comment: Quit in the 1970s    Alcohol use No         Family History:  Family History   Problem Relation Age of Onset    Hypertension Mother     Cancer Mother      breast    Hypertension Father     Heart Disease Father        Review of Systems:  Unable to obtain as pt nonverbal at the moment. Diet:  NPO    Objective:     Physical Exam:  Vitals:  Visit Vitals    /70    Pulse 79    Temp 98.1 °F (36.7 °C)    Resp 20    Ht 5' 6\" (1.676 m)    Wt 57.2 kg (126 lb)    SpO2 98%    BMI 20.34 kg/m2     Gen:  Pt is somnolent but responsive to questions   Skin:  Extremities and face reveal no rashes. HEENT: Sclerae anicteric. Dry MM with swelling of lips. Trach in place. Cardiovascular: Regular rate and rhythm. No murmurs, gallops, or rubs. Respiratory:  Coarse. GI:  Abdomen nondistended, soft, and nontender. Normal active bowel sounds. PEG in place. Appears old though not breaking down. Flushes fine. No surrounding inflammation or pain to palpation. Rectal:  Deferred  Musculoskeletal:  No pitting edema of the lower legs.     Neurological:  Difficult to assess      Laboratory:    Recent Labs      02/22/18   2158   WBC  11.1   HGB  14.7   HCT  45.4   PLT  354   MCV  89.7   NA  142   K  4.0   CL  102   CO2  26   BUN  38*   CREA  1.10   CA  8.2*   GLU  180*   AP  260*   SGOT  95*   ALT  182*   TBILI  0.6       Assessment:       Principal Problem:    CAP (community acquired pneumonia) (2/23/2018)    Active Problems:    Hypothyroidism (3/24/2016)      GERD (gastroesophageal reflux disease) (3/24/2016)      Overview: controlled with medication      Tracheal obstruction (3/24/2016)      Laryngeal cancer ([facility] (2/23/2018)      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 Currently, per the family, the PEG is working fine as they've had no problems with it. It occasionally gets clogged which responds to Coke. It was evaluated several mos ago at [facility]. No urgent need to replace the PEG at this time. Because it was placed at [address] (likely by surgery per family), I am unsure if there are any sutures in place. Normally, we just pull up the PEG and bumper up through the stoma and place a replacement tube. However, if sutures were in place, this would be a big problem. We can't do an EGD as his esophageal stricture was unable to be traversed by [facility] WILLA in the fall. I talked with the family about obtaining the actual OP note for the PEG so more information can be obtained. Once she has this, if found to be appropriate, he follow up with his [facility] GI MD for a simple change as an outpt. If it's sutured, he would need to f/u with the surgeon at [address] who placed it. GI will sign off. Please call with questions.     Flavio Sutton MD

## 2018-02-23 NOTE — PROGRESS NOTES
Care Management Interventions  PCP Verified by CM: Yes  Physical Therapy Consult: No  Occupational Therapy Consult: No  Current Support Network: Other  Confirm Follow Up Transport: Family  Plan discussed with Pt/Family/Caregiver: Yes  Freedom of Choice Offered: Yes  Discharge Location  Discharge Placement: Unable to determine at this time  Pt's sister lives with pt (at bedside). Pt started using a walker about 1 month ago. Sister assists with ADLs. Pt would benefit from [facility] and a HC. SW following.

## 2018-02-23 NOTE — PROGRESS NOTES
Patient is a potential Pneumonia Bundle Payment for Care Improvement (BPCI) patient and will be followed for 90 days post acute care. Hx Laryngeal/ Tongue Cancer, followed by Oncology. S/p Chemo/ radiation. .  Has Trach and PEG. RRAT- 8- current with Oncology Palliative Care, and will have team follow up with patient. Will need PT consult- unable to ambulate as usual for the past weeks. Patient lives with sister. Spoke with sister since patient has trach and cannot speak. Agreeable for Health  to follow in the home. Sister would like home care- SW assisting. We assist with transportation if needed.       Bhavik Velasco, RUSTY- [facility], BSN  Care Transition/ Bundled Payment Navigator  [phone]

## 2018-02-23 NOTE — PROGRESS NOTES
SBAR shift report received from [place], [facility]. Pt is lying in bed, sister present at bedside. Pt appears in no acute distress at this time. Pt has trach, in place, suctioned. Pt is on RA. Pt has PEG in place. Pt bottom lip swollen, sister states \"MD says to be lymphatic\". Pt and sister encouraged to call for assistance when needed, call light in reach. Safety measures in place. Will continue to monitor.

## 2018-02-23 NOTE — ED PROVIDER NOTES
HPI Comments: 80-year-old male with a history of parotid Adenocarcinoma, tongue cancer, and prostate cancer with tracheostomy s/p chemo and radiation presents with sisters and son for excessive sleepiness today and agitation. He had constipation last night and had a bowel movement after an enema. He received his normal nighttime medications last night, and did not wake up as normal today. Family states that they tried to saline suction his trach, but he became combative and frequently pulled out his inner cannula. This resulted in a fair amount of bleeding from the tracheostomy. They called the oncologist and was advised to come to the emergency department. He was seen in the emergency Department February 11 twice in the same day for respiratory distress and mental status changes. This was thought to be due to transient mucous plugging. Also had elevation in the troponin that was thought to be rate related due to tachycardia. He had CT of his head that showed sinusitis, CT of the soft tissue of the neck that showed expected findings from cancer and radiation, as well as a CT chest that was negative for pulmonary embolism. He was followed up by oncologist Feb 14th and fentanyl patch was decreased from 50 to 25 mcg. He was continued on Norco as needed for pain, Klonopin for anxiety, and Remeron for sleep. Ritalin was discontinued as it was thought to have increased anxiety. Patient currently complaining of chest pain, shortness of breath, throat pain, not feeling well. He has had increasing yellow drainage from bilateral eyes today and has not received his normal eyedrops. He has been unable to ambulate as usual for the past 2 weeks. Had decreased urination today. ENT called in Augmentin yesterday for \"possible infection\" around trach, but was not evaluated in office. Patient is a 79 y.o. male presenting with altered mental status.  The history is provided by a relative, the patient and the EMS personnel. Altered mental status    Associated symptoms include confusion and agitation. Past Medical History:   Diagnosis Date    Airway obstruction 4/20/2016    Allergic rhinitis     Anxiety 3/24/2016    BPH (benign prostatic hypertrophy)     Cerumen impaction     Chronic otitis media     Chronic pharyngitis     Condyloma acuminatum     Dyslipidemia 3/24/2016    Dyspnea 4/8/2016    ETD (eustachian tube dysfunction)     Former smoker     GERD (gastroesophageal reflux disease)     at times    History of condyloma acuminatum     on tongue    History of parotid cancer      History of sinus cancer 1989    Hypercholesterolemia 9/14/2016    Hypertension     Hypotension 4/21/2016    Hypothyroidism      secondary to radiation to neck    Ill-defined condition     left face twitching    Impotence of organic origin     Male stress incontinence 12/23/2014    Malignant neoplasm of head, face, and neck ([address] -- prior history.     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HCC)     Perforation of tympanic membrane     Personal history of prostate cancer     Prostate cancer ([address]     diagnosed 2/24/11    SOB (shortness of breath)     Stridor 3/24/2016    Tracheal mass -- reproted sub-glotic 3/25/2016    Tracheal obstruction 3/24/2016    Tracheostomy in place [facility]) 6/20/2016    Vocal cord paralysis 4/20/2016       Past Surgical History:   Procedure Laterality Date    HX GI      esophageal dilation    HX HEENT  2009    Removal lymph nodes/saliva glands and radiation/chemo    HX HEENT  2010    left total parotidectomy & bilateral radical neck dissection    HX MYRINGOTOMY Bilateral 2010, 2011, 2012 2010-Dr. Ellis    HX RADICAL PROSTATECTOMY  5/2011 Dr. Joann Santoyo in [facility]    lymph nodes removed         Family History:   Problem Relation Age of Onset    Hypertension Mother     Cancer Mother      breast    Hypertension Father     Heart Disease Father        Social History     Social History    Marital status: SINGLE     Spouse name: N/A    Number of children: N/A    Years of education: N/A     Occupational History    [employer]      11 years     Social History Main Topics    Smoking status: Former Smoker     Packs/day: 0.25     Years: 8.00     Types: Cigarettes     Quit date: 4/20/1976    Smokeless tobacco: Never Used      Comment: Quit in the 1970s    Alcohol use No    Drug use: No    Sexual activity: Not on file     Other Topics Concern    Not on file     Social History Narrative    Lives at home and his sister and son live with him. ALLERGIES: Ativan [lorazepam]; Levofloxacin; Other plant, animal, environmental; and Sulfa (sulfonamide antibiotics)    Review of Systems   Constitutional: Positive for fatigue. Negative for fever. HENT: Positive for congestion and facial swelling. Eyes: Positive for discharge and redness. Respiratory: Positive for cough and shortness of breath. Cardiovascular: Positive for chest pain. Negative for leg swelling. Gastrointestinal: Positive for constipation. Negative for abdominal pain and vomiting. Genitourinary: Positive for decreased urine volume. Musculoskeletal: Positive for neck pain. Skin: Positive for rash. Neurological: Negative for headaches. Psychiatric/Behavioral: Positive for agitation, confusion and sleep disturbance. Vitals:    02/22/18 2148   BP: 129/81   Pulse: (!) 132   Resp: 28   Temp: 97.7 °F (36.5 °C)   SpO2: 98%   Weight: 57.2 kg (126 lb)   Height: 5' 6\" (1.676 m)            Physical Exam   Constitutional: No distress. Chronically ill-appearing   HENT:   Head: Normocephalic and atraumatic.    Significant lower lip swelling. Chronic postoperative changes   Eyes: EOM are normal. Pupils are equal, round, and reactive to light. Right eye exhibits discharge. Left eye exhibits discharge. Right conjunctiva is injected. Left conjunctiva is injected. Neck: Rigidity present. Cardiovascular: Tachycardia present. No murmur heard. Pulmonary/Chest: Effort normal. Tachypnea noted. He has decreased breath sounds. He has rhonchi. He has rales. Abdominal: Soft. He exhibits no distension. There is no tenderness. PEG with small amount of drainage surrounding insertion site to left upper quadrant   Musculoskeletal: Normal range of motion. He exhibits no edema. Neurological: He exhibits abnormal muscle tone. Somnolent, nonverbal   Skin:        Psychiatric: He is slowed and withdrawn. Cognition and memory are impaired. Nursing note and vitals reviewed. MDM  Number of Diagnoses or Management Options  Community acquired pneumonia of right upper lobe of lung ([facility]): Hypoxia:   Severe sepsis [facility]):   Diagnosis management comments: Parts of this document were created using dragon voice recognition software. The chart has been reviewed but errors may still be present. Patchy airspace disease in right upper lobe with increasing lethargy, AMS,  and 2 recent ER admissions. Will check further labs. Will likely need admission. Do not feel repeat CT head indicated. 11:59 PM  Lactic acid 4.1. 30cc/kg bolus ordered. No recent hospital admission. Not on vent. Given rocephin, zithromax, vanc. Will repeat lactic after bolus. dw pulmonary for admission. Hypoxia on ABG. Trach mask ordered.        Amount and/or Complexity of Data Reviewed  Clinical lab tests: ordered and reviewed (Results for orders placed or performed during the hospital encounter of 02/22/18  -CBC WITH AUTOMATED DIFF       Result                                            Value                         Ref Range                       WBC 11.1                          4.3 - 11.1 K/uL                 RBC                                               5.06                          4.23 - 5.67 M/uL                HGB                                               14.7                          13.6 - 17.2 g/dL                HCT                                               45.4                          41.1 - 50.3 %                   MCV                                               89.7                          79.6 - 97.8 FL                  MCH                                               29.1                          26.1 - 32.9 PG                  MCHC                                              32.4                          31.4 - 35.0 g/dL                RDW                                               14.8 (H)                      11.9 - 14.6 %                   PLATELET                                          354                           150 - 450 K/uL                  MPV                                               10.3 (L)                      10.8 - 14.1 FL                  DF                                                AUTOMATED                                                     NEUTROPHILS                                       75                            43 - 78 %                       LYMPHOCYTES                                       20                            13 - 44 %                       MONOCYTES                                         5                             4.0 - 12.0 %                    EOSINOPHILS                                       0 (L)                         0.5 - 7.8 %                     BASOPHILS                                         0                             0.0 - 2.0 %                     IMMATURE GRANULOCYTES                             0                             0.0 - 5.0 %                     ABS.  NEUTROPHILS                                  8.4 (H) 1.7 - 8.2 K/UL                  ABS. LYMPHOCYTES                                  2.2                           0.5 - 4.6 K/UL                  ABS. MONOCYTES                                    0.5                           0.1 - 1.3 K/UL                  ABS. EOSINOPHILS                                  0.0                           0.0 - 0.8 K/UL                  ABS. BASOPHILS                                    0.0                           0.0 - 0.2 K/UL                  ABS. IMM. GRANS.                                  0.0                           0.0 - 0.5 K/UL             -METABOLIC PANEL, COMPREHENSIVE       Result                                            Value                         Ref Range                       Sodium                                            142                           136 - 145 mmol/L                Potassium                                         4.0                           3.5 - 5.1 mmol/L                Chloride                                          102                           98 - 107 mmol/L                 CO2                                               26                            21 - 32 mmol/L                  Anion gap                                         14                            7 - 16 mmol/L                   Glucose                                           180 (H)                       65 - 100 mg/dL                  BUN                                               38 (H)                        8 - 23 MG/DL                    Creatinine                                        1. 10                          0.8 - 1.5 MG/DL                 GFR est AA                                        >60                           >60 ml/min/1.73m2               GFR est non-AA                                    >60                           >60 ml/min/1.73m2               Calcium                                           8.2 (L)                       8.3 - 10.4 MG/DL                Bilirubin, total                                  0.6                           0.2 - 1.1 MG/DL                 ALT (SGPT)                                        182 (H)                       12 - 65 U/L                     AST (SGOT)                                        95 (H)                        15 - 37 U/L                     Alk. phosphatase                                  260 (H)                       50 - 136 U/L                    Protein, total                                    7.3                           6.3 - 8.2 g/dL                  Albumin                                           3.0 (L)                       3.2 - 4.6 g/dL                  Globulin                                          4.3 (H)                       2.3 - 3.5 g/dL                  A-G Ratio                                         0.7 (L)                       1.2 - 3.5                  -AMMONIA       Result                                            Value                         Ref Range                       Ammonia                                           20                            11 - 32 UMOL/L             -BLOOD GAS, ARTERIAL       Result                                            Value                         Ref Range                       pH                                                7.50 (H)                      7.35 - 7.45                     PCO2                                              34 (L)                        35 - 45 mmHg                    PO2                                               69 (L)                        80 - 105 mmHg                   BICARBONATE                                       26                            22 - 26 mmol/L                  BASE EXCESS                                       3.1 (H)                       0 - 3 mmol/L                    TOTAL HEMOGLOBIN                                  14.7                          11.7 - 15.0 GM/DL O2 SAT                                            94                            92 - 98.5 %                     Arterial O2 Hgb                                   93.2 (L)                      94 - 97 %                       CARBOXYHEMOGLOBIN                                 0.6                           0.5 - 1.5 %                     METHEMOGLOBIN                                     0.4                           0.0 - 1.5 %                     DEOXYHEMOGLOBIN                                   6 (H)                         0.0 - 5.0 %                     SITE                                              RR                                                            ALLENS TEST                                       POSITIVE                                                      MODE                                              RA                                                            FIO2                                              21.0                          %                          -POC LACTIC ACID       Result                                            Value                         Ref Range                       Lactic Acid (POC)                                 4.1 (H)                       0.5 - 1.9 mmol/L           -EKG, 12 LEAD, INITIAL       Result                                            Value                         Ref Range                       Ventricular Rate                                  135                           BPM                             Atrial Rate                                       135                           BPM                             P-R Interval                                      112                           ms                              QRS Duration                                      64                            ms                              Q-T Interval                                      274                           ms QTC Calculation (Bezet)                           411                           ms                              Calculated P Axis                                 82                            degrees                         Calculated R Axis                                 17                            degrees                         Calculated T Axis                                 72                            degrees                         Diagnosis                                                                                                   !! AGE AND GENDER SPECIFIC ECG ANALYSIS !! Sinus tachycardia with Fusion complexes   Biatrial enlargement   Nonspecific ST abnormality   Abnormal ECG   When compared with ECG of 11-FEB-2018 21:29,   Fusion complexes are now Present   Premature ventricular complexes are no longer Present     )  Tests in the radiology section of CPT®: ordered and reviewed (Xr Chest Port    Result Date: 2/22/2018  Portable chest x-ray Clinical indications: Shortness of breath, tracheostomy tube FINDINGS: Single AP view the chest compared to a similar study dated 2/11/2018 shows new patchy airspace opacification in the right upper lung suspicious for pneumonia. There is no pleural effusion or pneumothorax. The cardiac silhouette and mediastinum are stable with tracheostomy tube in place.      IMPRESSION: Patchy airspace opacity in the right upper lung suspicious for pneumonia.    )  Tests in the medicine section of CPT®: ordered and reviewed          ED Course       Procedures    Sepsis Re-Assessment Documentation:       Vital Signs  Level of Consciousness: Alert  Temp: 97.7 °F (36.5 °C)  Temp Source: Axillary  Pulse (Heart Rate): (!) 109  Heart Rate Source: Monitor  Resp Rate: (!) 33  BP: 120/86  MAP (Monitor): 99  MAP (Calculated): 97  BP 1 Location: Right arm  BP 1 Method: Automatic  BP Patient Position: At rest  MEWS Score: 5

## 2018-02-23 NOTE — PROGRESS NOTES
Problem: Nutrition Deficit  Goal: *Optimize nutritional status  Nutrition  Reason for assessment: Referral received from nursing admission for Enteral/Parenteral   Assessment:   Diet order(s): NPO  Food/Nutrition Patient History:  Patient noted to have a h/o laryngeal/tongue cancer. The patient has a PEG tube and is followed by outpatient [facility]. At home the patient will bolus 4 cans of Isosource 1.5 + 1-2 bottles Ensure Plus + 1 dose of Pro-Stat daily, 90 ml flush before and after each feed. Extra pedialyte via PEG between feedings. The patient also uses mineral oil for constipation. Isosource 1.5 is not on formulary at [facility], will use Osmolite 1.2 instead. Per sister the patient is able to tolerate about 1.5 bottles of formula at a time. States he can bolus 2 bottles but prefers not to. Reports that when they bolus the Ensure Enlive they have to dilute it with water otherwise it \"gets stuck. \" Weight history in the EMR cannot be verified as accurate due to unknown weight source (pt stated vs estimated vs measured). WT / BMI WEIGHT   2/22/2018 126 lb   2/14/2018 126 lb 8 oz   2/11/2018 128 lb   2/7/2018 128 lb   1/31/2018 129 lb 3.2 oz   1/26/2018 130 lb   1/24/2018 130 lb   1/23/2018 130 lb   1/20/2018 128 lb   1/15/2018 128 lb 1.6 oz   1/8/2018 129 lb 3.2 oz   1/2/2018 130 lb 12.8 oz   1/2/2018 130 lb 12.8 oz   12/27/2017 134 lb   12/19/2017 133 lb   12/13/2017 132 lb 9.6 oz   12/13/2017 132 lb 9.6 oz   12/6/2017 135 lb 4.8 oz   12/4/2017 133 lb   11/29/2017 133 lb 14.4 oz   11/21/2017 136 lb 12.8 oz   According to the EMR the patient has potentially lost ~10# over the past 3 months. This is a clinically insignificant weight loss of 7.3% over 3 months.   Pertinent Medications: solumedrol, remeron, D5 NS at 100 ml/hr  Pertinent labs: AM Glucose 180 (no h/o diabetes but noted to be on solumedrol)  Anthropometrics:Height: 5' 6\" (167.6 cm),  Weight: 57.2 kg (126 lb), Weight Source: Patient stated, Body mass index is 20.34 kg/(m^2). BMI class of underweight for age >71. Consider obtaining an actual weight over a patient stated weight. Macronutrient needs:  EER:  6107-5186 kcal /day (30-40 kcal/kg pt stated BW) (for weight gain-per sister Yessenia Patten is wanting weight gain-this RD agrees)  EPR:  57-74 grams protein/day (1-1.3 grams/kg IBW)  Max CHO: 313 grams/day (55% of kcal needs)  Intake/Comparative Standards: Current NPO status with no bolus feedings meets 0% of estimated needs    Nutrition Diagnosis: Difficulty swallowing r/t laryngeal/tongue cancer as evidenced by patient requires PEG tube feeding to meet nutritional needs. Intervention:  Meals and snacks: NPO  EN: Recommend starting bolus TF of Osmolite 1.2 of 6 bottles per day with 1 Ensure Enlive bolus. Provide 1.5 bottles of Osmolite 1.2 TID (0800, 1200, 1600-for a total of 6 bottles daily). Followed by a 180 ml water flush. Provide nightly bolus of Ensure Enlive (mix 1 bottle with 100 ml of water) @ 2000 nightly. Followed by a 125 ml water flush to provide 2060 kcal/day (100% of needs), 99 grams protein/day (100% of needs), 269 grams CHO/day (does not exceed max CHO),  and ~ 1935 ml free water/day (100% of needs). Patient to receive extra water during medication administration. Consult RD to start TF if TF is warranted  Nutrition Supplement Therapy: Electrolyte replacement per nutritional support protocols are active on MAR. Labs: BMP daily  IV: Recommend to discontinue IVF once TF is started. Coordination of nutrition care: April, RN  Discharge nutrition plan: continue home TF as recommended by Tito Stern RD, , LD    Mark Wagner.  Marce [address], [address] [place] [address],  [phone]

## 2018-02-23 NOTE — H&P
HISTORY AND PHYSICAL      Tho Golden    2/23/2018    Date of Admission:  2/22/2018    The patient's chart is reviewed and the patient is discussed with the staff. Subjective:     Patient is a 79 y.o.  male presents with cough , weakness, excessive trach secretions . Patient was not able to give any hx , he has trach , he lives at home with hx of Laryngeal cancer , in the lat 24-48 hours , he had more secretions and SOB , he was given Augmentin from ENT but that didn't help     75-year-old male with a history of parotid Adenocarcinoma, tongue cancer, and prostate cancer with tracheostomy s/p chemo and radiation presents with sisters and son for excessive sleepiness today and agitation. He had constipation last night and had a bowel movement after an enema. He received his normal nighttime medications last night, and did not wake up as normal today. Family states that they tried to saline suction his trach, but he became combative and frequently pulled out his inner cannula. This resulted in a fair amount of bleeding from the tracheostomy. They called the oncologist and was advised to come to the emergency department. He was seen in the emergency Department February 11 twice in the same day for respiratory distress and mental status changes. This was thought to be due to transient mucous plugging. Also had elevation in the troponin that was thought to be rate related due to tachycardia. He had CT of his head that showed sinusitis, CT of the soft tissue of the neck that showed expected findings from cancer and radiation, as well as a CT chest that was negative for pulmonary embolism. He was followed up by oncologist Feb 14th and fentanyl patch was decreased from 50 to 25 mcg. He was continued on Norco as needed for pain, Klonopin for anxiety, and Remeron for sleep. Ritalin was discontinued as it was thought to have increased anxiety.   Patient currently complaining of chest pain, shortness of breath, throat pain, not feeling well. He has had increasing yellow drainage from bilateral eyes today and has not received his normal eyedrops. He has been unable to ambulate as usual for the past 2 weeks. Had decreased urination today. ENT called in Augmentin yesterday for \"possible infection\" around trach, but was not evaluated in office. Review of Systems  Review of systems not obtained due to patient factors. Patient Active Problem List   Diagnosis Code    Hypothyroidism E56.8    Male stress incontinence N39.3    Condyloma acuminatum A63.0    Impotence of organic origin N52.9    GERD (gastroesophageal reflux disease) K21.9    Malignant neoplasm of prostate ([address] C61    Malignant neoplasm of head, face, and neck ([address] -- prior history. C76.0   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HCC) C07    Dyslipidemia E78.5    Anxiety F41.9    Stridor R06.1    Tracheal obstruction J39.8    Tracheal mass -- reproted sub-glotic J39.8    Hypertension I10    Dyspnea R06.00    Airway obstruction J98.8    Vocal cord paralysis J38.00    Hypotension I95.9    Tracheostomy in place ([address] Z93.0    Hypercholesterolemia E78.00    Ill-defined condition R69    Chronic otitis media H66.90    Perforation of tympanic membrane H72.90    Chronic pharyngitis J31.2    ETD (eustachian tube dysfunction) H69.80    Mixed hearing loss H90.8    Malignant tumor of parotid gland (HCC) C07    Essential hypertension with goal blood pressure less than 130/85 I10    Pleural effusion J90    Mediastinal lymphadenopathy R59.0    Hypoxia R09.02    Elevated troponin R74.8    Syncope R55    CAP (community acquired pneumonia) J18.9    Laryngeal cancer (HCC) C32.9    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 Taking?    HYDROcodone-acetaminophen (NORCO)  mg tablet   No No   Sig: Take 1 Tab by mouth every eight (8) hours as needed for Pain. Max Daily Amount: 3 Tabs. Omeprazole delayed release (PRILOSEC D/R) 20 mg tablet   Yes No   Sig: Take 20 mg by mouth daily. amoxicillin-clavulanate (AUGMENTIN ES-600) 600-42.9 mg/5 mL suspension   No No   Sig: Take 21.5 mL by mouth two (2) times a day for 7 days. aspirin 81 mg chewable tablet   No No   Sig: Take 1 Tab by mouth daily. clonazePAM (KLONOPIN) 0.5 mg tablet   Yes No   Sig: Take 0.5 mg by mouth three (3) times daily as needed. Indications: rarely uses   dexamethasone (DECADRON) 4 mg tablet   No No   Sig: Take 1 Tab by mouth two (2) times daily (with meals). Take twice daily as needed while lips swollen. doxycycline (ADOXA) 100 mg tablet   No No   Sig: Take 1 Tab by mouth daily. fentaNYL (DURAGESIC) 50 mcg/hr PATCH   No No   Si Patch by TransDERmal route every seventy-two (72) hours. Max Daily Amount: 1 Patch.   levothyroxine (SYNTHROID) 100 mcg tablet   Yes No   Sig: Take 137 mcg by mouth Daily (before breakfast). metoprolol tartrate (LOPRESSOR) 25 mg tablet   No No   Sig: Take 1 Tab by mouth two (2) times a day. mirtazapine (REMERON) 15 mg tablet   No No   Sig: Take 1 Tab by mouth nightly. ondansetron hcl (ZOFRAN) 8 mg tablet   No No   Sig: Take 1 Tab by mouth every eight (8) hours as needed for Nausea. sertraline (ZOLOFT) 50 mg tablet   No No   Sig: Take 1 Tab by mouth daily.       Facility-Administered Medications: None       Past Medical History:   Diagnosis Date    Airway obstruction 2016    Allergic rhinitis     Anxiety 3/24/2016    BPH (benign prostatic hypertrophy)     Cerumen impaction     Chronic otitis media     Chronic pharyngitis     Condyloma acuminatum     Dyslipidemia 3/24/2016    Dyspnea 2016    ETD (eustachian tube dysfunction)     Former smoker     GERD (gastroesophageal reflux disease)     at times    History of condyloma acuminatum     on tongue    History of parotid cancer      History of sinus cancer 1989    Hypercholesterolemia 9/14/2016    Hypertension     Hypotension 4/21/2016    Hypothyroidism      secondary to radiation to neck    Ill-defined condition     left face twitching    Impotence of organic origin     Male stress incontinence 12/23/2014    Malignant neoplasm of head, face, and neck ([address] -- prior history.     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[address]     Perforation of tympanic membrane     Personal history of prostate cancer     Prostate cancer ([address]     diagnosed 2/24/11    SOB (shortness of breath)     Stridor 3/24/2016    Tracheal mass -- reproted sub-glotic 3/25/2016    Tracheal obstruction 3/24/2016    Tracheostomy in place [facility]) 6/20/2016    Vocal cord paralysis 4/20/2016     Past Surgical History:   Procedure Laterality Date    HX GI      esophageal dilation    HX HEENT  2009    Removal lymph nodes/saliva glands and radiation/chemo    HX HEENT  2010    left total parotidectomy & bilateral radical neck dissection    HX MYRINGOTOMY Bilateral 2010, 2011, 2012 2010-Dr. Joao Jefferson  5/2011     Dr. Milagros Loja in [facility]    lymph nodes removed     Social History     Social History    Marital status: SINGLE     Spouse name: N/A    Number of children: N/A    Years of education: N/A     Occupational History    [employer]      11 years     Social History Main Topics    Smoking status: Former Smoker     Packs/day: 0.25     Years: 8.00     Types: Cigarettes     Quit date: 4/20/1976    Smokeless tobacco: Never Used      Comment: Quit in the 1970s    Alcohol use No    Drug use: No    Sexual activity: Not on file     Other Topics Concern    Not on file Social History Narrative    Lives at home and his sister and son live with him. Family History   Problem Relation Age of Onset    Hypertension Mother     Cancer Mother      breast    Hypertension Father     Heart Disease Father      Allergies   Allergen Reactions    Ativan [Lorazepam] Other (comments)     Tremors    Levofloxacin Other (comments)     Muscle cramps/pain    Other Plant, Animal, Environmental Unknown (comments)     Sulfate      Sulfa (Sulfonamide Antibiotics) Shortness of Breath and Other (comments)       Current Facility-Administered Medications   Medication Dose Route Frequency    vancomycin (VANCOCIN) 1250 mg in  ml infusion  1,250 mg IntraVENous ONCE    budesonide (PULMICORT) 500 mcg/2 ml nebulizer suspension  500 mcg Nebulization BID RT    methylPREDNISolone (PF) (SOLU-MEDROL) injection 40 mg  40 mg IntraVENous Q6H    azithromycin (ZITHROMAX) 500 mg in 0.9% sodium chloride (MBP/ADV) 250 mL  500 mg IntraVENous ONCE    sodium chloride 0.9 % bolus infusion 700 mL  700 mL IntraVENous ONCE     Current Outpatient Prescriptions   Medication Sig    amoxicillin-clavulanate (AUGMENTIN ES-600) 600-42.9 mg/5 mL suspension Take 21.5 mL by mouth two (2) times a day for 7 days.  aspirin 81 mg chewable tablet Take 1 Tab by mouth daily.  metoprolol tartrate (LOPRESSOR) 25 mg tablet Take 1 Tab by mouth two (2) times a day.  mirtazapine (REMERON) 15 mg tablet Take 1 Tab by mouth nightly.  Omeprazole delayed release (PRILOSEC D/R) 20 mg tablet Take 20 mg by mouth daily.  fentaNYL (DURAGESIC) 50 mcg/hr PATCH 1 Patch by TransDERmal route every seventy-two (72) hours. Max Daily Amount: 1 Patch.  HYDROcodone-acetaminophen (NORCO)  mg tablet Take 1 Tab by mouth every eight (8) hours as needed for Pain. Max Daily Amount: 3 Tabs.  sertraline (ZOLOFT) 50 mg tablet Take 1 Tab by mouth daily.     dexamethasone (DECADRON) 4 mg tablet Take 1 Tab by mouth two (2) times daily (with meals). Take twice daily as needed while lips swollen.  doxycycline (ADOXA) 100 mg tablet Take 1 Tab by mouth daily.  ondansetron hcl (ZOFRAN) 8 mg tablet Take 1 Tab by mouth every eight (8) hours as needed for Nausea.  levothyroxine (SYNTHROID) 100 mcg tablet Take 137 mcg by mouth Daily (before breakfast).  clonazePAM (KLONOPIN) 0.5 mg tablet Take 0.5 mg by mouth three (3) times daily as needed. Indications: rarely uses           Objective:     Vitals:    02/22/18 2205 02/22/18 2221 02/22/18 2241 02/22/18 2301   BP: (!) 133/94 110/77 120/86    Pulse: (!) 135 (!) 125 (!) 111 (!) 109   Resp: (!) 3 (!) 87 (!) 33 (!) 33   Temp:       SpO2: 99% 99% 99% 96%   Weight:       Height:           PHYSICAL EXAM     Constitutional:  the patient is well developed and in no acute distress  EENMT:  Sclera clear, pupils equal, oral mucosa moist  Respiratory: with trach , no distress, scattered wheezing   Cardiovascular:  RRR without M,G,R  Gastrointestinal: soft and non-tender; with positive bowel sounds. Musculoskeletal: warm without cyanosis. There is no lower leg edema. Skin:  no jaundice or rashes, no wounds   Neurologic: no gross neuro deficits     Psychiatric:  alert and oriented x 2    CXR:      Recent Labs      02/22/18 2158   WBC  11.1   HGB  14.7   HCT  45.4   PLT  354     Recent Labs      02/22/18   2308  02/22/18   2158   NA   --   142   K   --   4.0   CL   --   102   GLU   --   180*   CO2   --   26   BUN   --   38*   CREA   --   1.10   CA   --   8.2*   TROIQ  0.14*   --    ALB   --   3.0*   TBILI   --   0.6   ALT   --   182*   SGOT   --   95*     Recent Labs      02/22/18   2330   PH  7.50*   PCO2  34*   PO2  69*   HCO3  26     No results for input(s): LCAD, LAC in the last 72 hours.     Assessment:  (Medical Decision Making)     Hospital Problems  Date Reviewed: 2/15/2018          Codes Class Noted POA    * (Principal)CAP (community acquired pneumonia) ICD-10-CM: J18.9  ICD-9-CM: 581  2/23/2018 Unknown        Laryngeal cancer ([facility] ICD-10-CM: C32.9  ICD-9-CM: 161.9  2/23/2018 Unknown        Tracheostomy dependence ([facility] ICD-10-CM: Z93.0  ICD-9-CM: V44.0  2/23/2018 Unknown        Hypothyroidism (Chronic) ICD-10-CM: E03.9  ICD-9-CM: 244.9  3/24/2016 Yes        GERD (gastroesophageal reflux disease) (Chronic) ICD-10-CM: K21.9  ICD-9-CM: 530.81  3/24/2016 Yes    Overview Signed 3/21/2016 11:50 AM by Merari Ortiz     controlled with medication             Tracheal obstruction ICD-10-CM: J39.8  ICD-9-CM: 519.19  3/24/2016 Yes              Plan:  (Medical Decision Making)     --Will admit for further medical management  --Supplemental O2   --Respiratory nebulizer treatments  --culture  --steroid therapy  --antibiotic therapy    More than 50% of the time documented was spent in face-to-face contact with the patient and in the care of the patient on the floor/unit where the patient is located.     Douglas Ramon MD

## 2018-02-23 NOTE — ED NOTES
TRANSFER - OUT REPORT:    Verbal report given to alexis massey(name) on [place]  being transferred to 830(unit) for routine progression of care       Report consisted of patients Situation, Background, Assessment and   Recommendations(SBAR). Information from the following report(s) SBAR, ED Summary, [facility] and Recent Results was reviewed with the receiving nurse. Lines:   Peripheral IV 02/22/18 Right Antecubital (Active)       Peripheral IV 02/22/18 Left Antecubital (Active)        Opportunity for questions and clarification was provided.

## 2018-02-23 NOTE — PROGRESS NOTES
TRANSFER - IN REPORT:    Verbal report received from Parker sibley Lily Johansen  being received from ED for routine progression of care      Report consisted of patients Situation, Background, Assessment and   Recommendations(SBAR). Information from the following report(s) Kardex was reviewed with the receiving nurse. Opportunity for questions and clarification was provided. Assessment completed upon patients arrival to unit and care assumed.

## 2018-02-24 NOTE — PROGRESS NOTES
Pt is in bed alert with some confusion unable to communicate due to trach. He has a c/o pain 7/10. Norco 10 was given pt tolerated well. Corrina Floss is in place on room air. O2 sat is 97%. abd is soft bowel sounds are active he has peg in place patent and is receiving bolus feeds. He tolerates well. He has bruising to jorge clavicular area. No new bruising noted. Safety measures in place will continue to monitor.

## 2018-02-24 NOTE — PROGRESS NOTES
Problem: Falls - Risk of  Goal: *Absence of Falls  Document Mario Fall Risk and appropriate interventions in the flowsheet.    Outcome: Progressing Towards Goal  Fall Risk Interventions:       Mentation Interventions: Bed/chair exit alarm    Medication Interventions: Bed/chair exit alarm    Elimination Interventions: Call light in reach    History of Falls Interventions: Bed/chair exit alarm

## 2018-02-24 NOTE — PROGRESS NOTES
Patient is alert, sitting up in bed, family @ bedside, communicates by writing words on paper, HOB elevated, on RA, RR even and unlabored, patient IV fluids infusing, denies pain and discomfort denies needs, call light within reach.

## 2018-02-24 NOTE — PROGRESS NOTES
Pt is in room alert and oriented. rr are even and unlabored. Lung sounds are diminished. Trach in place. He has a c/o leg pain MD notified new orders for Neurontin 100mg TID. Pt has peg in place patent and receiving bolus feeds q 4 while awake. abd is soft bowel sounds are active. He has been suctioned and tach cleaned. Safety measures in place will continue to monitor.

## 2018-02-24 NOTE — PROGRESS NOTES
Patient has rested quietly throughout night, calm and cooperative, IV fluids infusing, urine obtained this a.m. Patient denies needs @ this time, call light within reach.

## 2018-02-24 NOTE — PROGRESS NOTES
Timothy Bhakta  Admission Date: 2/22/2018             Daily Progress Note: 2/24/2018    The patient's chart is reviewed and the patient is discussed with the staff.     Subjective:     On TC  Has neuropathy pain  Family is at bed side    Current Facility-Administered Medications   Medication Dose Route Frequency    aspirin chewable tablet 81 mg  81 mg Oral DAILY    clonazePAM (KlonoPIN) tablet 0.5 mg  0.5 mg Oral BID    HYDROcodone-acetaminophen (NORCO)  mg tablet 2 Tab  2 Tab Oral Q6H PRN    levothyroxine (SYNTHROID) tablet 137 mcg  137 mcg Oral ACB    metoprolol tartrate (LOPRESSOR) tablet 25 mg  25 mg Oral BID    mirtazapine (REMERON) tablet 15 mg  15 mg Oral QHS    sertraline (ZOLOFT) tablet 50 mg  50 mg Oral DAILY    sodium chloride (NS) flush 5-10 mL  5-10 mL IntraVENous Q8H    sodium chloride (NS) flush 5-10 mL  5-10 mL IntraVENous PRN    azithromycin (ZITHROMAX) tablet 500 mg  500 mg Oral Q24H    cefTRIAXone (ROCEPHIN) 1 g in 0.9% sodium chloride (MBP/ADV) 50 mL  1 g IntraVENous Q24H    enoxaparin (LOVENOX) injection 40 mg  40 mg SubCUTAneous Q24H    budesonide (PULMICORT) 500 mcg/2 ml nebulizer suspension  500 mcg Nebulization BID RT    methylPREDNISolone (PF) (SOLU-MEDROL) injection 40 mg  40 mg IntraVENous Q6H    dextrose 5% and 0.9% NaCl infusion  100 mL/hr IntraVENous CONTINUOUS       Review of Systems      Constitutional: negative for fever, chills, sweats  Cardiovascular: negative for chest pain, palpitations, syncope, edema  Gastrointestinal:  negative for dysphagia, reflux, vomiting, diarrhea, abdominal pain, or melena  Neurologic:  negative for focal weakness, numbness, headache    Objective:     Vitals:    02/24/18 0324 02/24/18 0726 02/24/18 0845 02/24/18 1130   BP: 101/65 (!) 182/97  97/62   Pulse: 72 82  83   Resp: 18 18 18   Temp: 98.3 °F (36.8 °C)   98.8 °F (37.1 °C)   SpO2: 96% 93% 97% 97%   Weight:       Height:         Intake and Output:   02/22 1901 - 02/24 0700  In: 1500 [I.V.:1500]  Out: -        Physical Exam:   Constitution:  the patient is well developed and in no acute distress, on TC  EENMT:  Sclera clear, pupils equal, oral mucosa moist  Respiratory: rhonchi  Cardiovascular:  RRR without M,G,R  Gastrointestinal: soft and non-tender; with positive bowel sounds. + PEG  Musculoskeletal: warm without cyanosis. There is no lower leg edema. Skin:  no jaundice or rashes, no wounds   Neurologic: no gross neuro deficits     Psychiatric:  alert and oriented x 3    CXR:             LAB  No results for input(s): GLUCPOC in the last 72 hours. No lab exists for component: [place]   Recent Labs      02/22/18   2158   WBC  11.1   HGB  14.7   HCT  45.4   PLT  354     Recent Labs      02/22/18   2308  02/22/18   2158   NA   --   142   K   --   4.0   CL   --   102   CO2   --   26   GLU   --   180*   BUN   --   38*   CREA   --   1.10   CA   --   8.2*   TROIQ  0.14*   --    ALB   --   3.0*   TBILI   --   0.6   ALT   --   182*   SGOT   --   95*     Recent Labs      02/22/18   2330   PH  7.50*   PCO2  34*   PO2  69*   HCO3  26     No results for input(s): LCAD, LAC in the last 72 hours.          2/24/2018 12:45 PM - Lul, Lab In Disconnectquest       Component Results      Component Value Ref Range & Units Status     Special Requests: suctioned from trach  Preliminary     GRAM STAIN FEW GRAM POSITIVE COCCI   Preliminary     GRAM STAIN FEW GRAM NEGATIVE RODS   Preliminary     GRAM STAIN MODERATE GRAM NEGATIVE COCCI   Preliminary     GRAM STAIN 10 TO 50 WBC'S PER OIF  Preliminary     GRAM STAIN   Preliminary     0 TO 2 EPITHELIAL CELLS PER OIF     Culture result:    Preliminary     HEAVY GRAM NEGATIVE RODS SUBCULTURE IN PROGRESS (A)     Culture result:    Preliminary     MODERATE NORMAL RESPIRATORY MATT       Result History              Assessment:  (Medical Decision Making)     Hospital Problems  Date Reviewed: 2/15/2018          Codes Class Noted POA    * (Principal)Community acquired pneumonia of right upper lobe of lung ([facility] ICD-10-CM: J18.1  ICD-9-CM: 881  2/23/2018 Yes        Laryngeal cancer ([facility] ICD-10-CM: C32.9  ICD-9-CM: 161.9  2/23/2018 Yes        Tracheostomy dependence ([facility] ICD-10-CM: Z93.0  ICD-9-CM: V44.0  2/23/2018 Yes        Hypothyroidism (Chronic) ICD-10-CM: E03.9  ICD-9-CM: 244.9  3/24/2016 Yes        GERD (gastroesophageal reflux disease) (Chronic) ICD-10-CM: K21.9  ICD-9-CM: 530.81  3/24/2016 Yes    Overview Signed 3/21/2016 11:50 AM by Francy Gutiérrez     controlled with medication             Tracheal obstruction ICD-10-CM: J39.8  ICD-9-CM: 519.19  3/24/2016 Yes              Plan:  (Medical Decision Making)     --cont. ABX D #2  --cont. IVF  --add Neurontin  --updated his family    More than 50% of the time documented was spent in face-to-face contact with the patient and in the care of the patient on the floor/unit where the patient is located.     Tami Ferro MD

## 2018-02-25 NOTE — PROGRESS NOTES
Patient is resting quietly in bed, suctioned this a.m. Little output thin secretions, has been resting quietly in bed throughout night, calm and cooperative, IV fluids infusing, denies pain and discomfort, denies needs, call light within reach.

## 2018-02-25 NOTE — PROGRESS NOTES
Problem: Mobility Impaired (Adult and Pediatric)  Goal: *Therapy Goal (Edit Goal, Insert Text)  STG:  (1.)Mr. Lombardi will move from supine to sit and sit to supine , scoot up and down and roll side to side with CONTACT GUARD ASSIST within 4 treatment day(s). (2.)Mr. Lombardi will transfer from bed to chair and chair to bed with CONTACT GUARD ASSIST using the least restrictive device within 4 treatment day(s). (3.)Mr. Lombardi will ambulate with CONTACT GUARD ASSIST for 200 feet with the least restrictive device within 4 treatment day(s). LTG:  (1.)Mr. Lombardi will move from supine to sit and sit to supine , scoot up and down and roll side to side in bed with STAND BY ASSIST within 7 treatment day(s). (2.)Mr. Lombardi will transfer from bed to chair and chair to bed with STAND BY ASSIST using the least restrictive device within 7 treatment day(s). (3.)Mr. Lombardi will ambulate with STAND BY ASSIST for 500 feet with the least restrictive device within 7 treatment day(s). ________________________________________________________________________________________________       PHYSICAL THERAPY: Initial Assessment, Treatment Day: Day of Assessment 2/24/2018  INPATIENT: Hospital Day: 3  [place] / Product Type: Medicare /      NAME/AGE/GENDER: Harish Collazo is a 79 y.o. male   PRIMARY DIAGNOSIS: CAP (community acquired pneumonia) Community acquired pneumonia of right upper lobe of lung ([facility] Community acquired pneumonia of right upper lobe of lung ([facility]        ICD-10: Treatment Diagnosis:   · Difficulty in walking, Not elsewhere classified (R26.2)   Precaution/Allergies:  Ativan [lorazepam]; Levofloxacin; Other plant, animal, environmental; and Sulfa (sulfonamide antibiotics)      ASSESSMENT:     Mr. Fitz Oliva presents with decreased transfers, ambulation and mobility with above diagnosis.     He demonstrates transfers of MIN A x 1 out of bed to sit and elects not to stand at this time but dangles edge of bed for several minutes. He sits with fair dynamic seated balance with forward trunk lean and weakness in the trunk and abdomen. Skilled PT is indicated for patient safety and mobility progression during current acute care episode. This section established at most recent assessment   PROBLEM LIST (Impairments causing functional limitations):  1. Decreased Strength  2. Decreased ADL/Functional Activities  3. Decreased Transfer Abilities  4. Decreased Ambulation Ability/Technique  5. Decreased Balance  6. Decreased Activity Tolerance  7. Decreased Pacing Skills  8. Decreased Flexibility/Joint Mobility  9. Decreased [place] with Home Exercise Program   INTERVENTIONS PLANNED: (Benefits and precautions of physical therapy have been discussed with the patient.)  1. Balance Exercise  2. Bed Mobility  3. Family Education  4. Gait Training  5. Home Exercise Program (HEP)  6. Range of Motion (ROM)  7. Therapeutic Activites  8. Therapeutic Exercise/Strengthening  9. Transfer Training     TREATMENT PLAN: Frequency/Duration: 3-5 times a week for duration of hospital stay  Rehabilitation Potential For Stated Goals: Good     RECOMMENDED REHABILITATION/EQUIPMENT: (at time of discharge pending progress): Due to the probability of continued deficits (see above) this patient will likely need continued skilled physical therapy after discharge. Equipment:    Walkers, Type: Rolling Walker              HISTORY:   History of Present Injury/Illness (Reason for Referral): PER MD H&P   Patient is a 79 y.o.   male presents with cough , weakness, excessive trach secretions .     Patient was not able to give any hx , he has trach , he lives at home with hx of Laryngeal cancer , in the lat 24-48 hours , he had more secretions and SOB , he was given Augmentin from ENT but that didn't help      66-year-old male with a history of parotid Adenocarcinoma, tongue cancer, and prostate cancer with tracheostomy s/p chemo and radiation presents with sisters and son for excessive sleepiness today and agitation.  He had constipation last night and had a bowel movement after an enema.  He received his normal nighttime medications last night, and did not wake up as normal today.  Family states that they tried to saline suction his trach, but he became combative and frequently pulled out his inner cannula.  This resulted in a fair amount of bleeding from the tracheostomy.  They called the oncologist and was advised to come to the emergency department. [facility] was seen in the emergency Department February 11 twice in the same day for respiratory distress and mental status changes.  This was thought to be due to transient mucous plugging.  Also had elevation in the troponin that was thought to be rate related due to tachycardia.  He had CT of his head that showed sinusitis, CT of the soft tissue of the neck that showed expected findings from cancer and radiation, as well as a CT chest that was negative for pulmonary embolism.  He was followed up by oncologist Feb 14th and fentanyl patch was decreased from 50 to 25 mcg.  He was continued on Norco as needed for pain, Klonopin for anxiety, and Remeron for sleep.  Ritalin was discontinued as it was thought to have increased anxiety.  Patient currently complaining of chest pain, shortness of breath, throat pain, not feeling well. [facility] has had increasing yellow drainage from bilateral eyes today and has not received his normal eyedrops.  He has been unable to ambulate as usual for the past 2 weeks.  Had decreased urination today. ENT called in Augmentin yesterday for \"possible infection\" around trach, but was not evaluated in office.     Past Medical History/Comorbidities:   Mr. Zhou Tellez  has a past medical history of Airway obstruction (4/20/2016); Allergic rhinitis;  Anxiety (3/24/2016); BPH (benign prostatic hypertrophy); Cerumen impaction; Chronic otitis media; Chronic pharyngitis; Condyloma acuminatum; Dyslipidemia (3/24/2016); Dyspnea (4/8/2016); ETD (eustachian tube dysfunction); Former smoker; GERD (gastroesophageal reflux disease); History of condyloma acuminatum; History of parotid cancer ( ); History of sinus cancer (1989); Hypercholesterolemia (9/14/2016); Hypertension; Hypotension (4/21/2016); Hypothyroidism ( ); Ill-defined condition; Impotence of organic origin; Male stress incontinence (12/23/2014); Malignant neoplasm of head, face, and neck ([address] -- prior history.; Malignant neoplasm of parotid gland ([address] (2009); Malignant neoplasm of prostate ([address] Mixed hearing loss; Nasopharyngeal cancer ([address] Neoplasm of uncertain behavior of tongue; Otitis media; Parotid gland adenocarcinoma ([address] Perforation of tympanic membrane; Personal history of prostate cancer; Prostate cancer ([address] SOB (shortness of breath); Stridor (3/24/2016); 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 Mr. Janette Valles  has a past surgical history that includes hx radical prostatectomy (5/2011); hx gi; hx heent (2009); hx heent (2010); pr sinus surgery proc unlisted (1989); and hx myringotomy (Bilateral, 2010, 2011, 2012). Social History/Living Environment:   Home Environment: Trailer/mobile home  # Steps to Enter: 9  One/Two Story Residence: Two story, live on 1st floor  # of Interior Steps: [address]: Right  Lift Chair Available: No  Living Alone: No  Support Systems: Orthodox / dayan community, Family member(s)  Patient Expects to be Discharged to[de-identified] Trailer/mobile home  Current DME Used/Available at [facility]: [employer], [place], straight, [place], quad  Prior Level of Function/Work/Activity:  Patient with declining functional mobility concurrent with progression of chemotherapy and radiation.      Dominant Side:         RIGHT  Personal Factors:          Sex:  male Age:  79 y.o. Number of Personal Factors/Comorbidities that affect the Plan of Care: 1-2: MODERATE COMPLEXITY   EXAMINATION:   Most Recent Physical Functioning:   Gross Assessment:  AROM: Generally decreased, functional  Strength: Generally decreased, functional  Coordination: Generally decreased, functional  Tone: Normal  Sensation: Intact               Posture:  Posture (WDL): Exceptions to WDL  Posture Assessment: Cervical, Forward head, Rounded shoulders  Balance:  Sitting: Impaired  Sitting - Static: Fair (occasional)  Sitting - Dynamic: Fair (occasional)  Standing: Impaired  Standing - Static: Not tested  Standing - Dynamic : Poor Bed Mobility:  Rolling: Minimum assistance  Supine to Sit: Minimum assistance  Sit to Supine: Minimum assistance  Scooting: Minimum assistance  Wheelchair Mobility:     Transfers:  Sit to Stand:  (unable/unwilling to sit to stand today)  Gait:            Body Structures Involved:  1. Bones  2. Joints  3. Muscles  4. Ligaments Body Functions Affected:  1. Neuromusculoskeletal  2. Movement Related  3. Skin Related  4. Metobolic/Endocrine Activities and Participation Affected:  1. General Tasks and Demands  2. Communication  3. Mobility  4. Self Care  5. Domestic Life  6. Community, Social and [place]   Number of elements that affect the Plan of Care: 3: MODERATE COMPLEXITY   CLINICAL PRESENTATION:   Presentation: Stable and uncomplicated: LOW COMPLEXITY   CLINICAL DECISION MAKING:   [facility] AM-PAC 6 Clicks   Basic Mobility Inpatient Short Form  How much difficulty does the patient currently have. .. Unable A Lot A Little None   1. Turning over in bed (including adjusting bedclothes, sheets and blankets)? [] 1   [] 2   [x] 3   [] 4   2. Sitting down on and standing up from a chair with arms ( e.g., wheelchair, bedside commode, etc.)   [] 1   [] 2   [x] 3   [] 4   3. Moving from lying on back to sitting on the side of the bed?    [] 1   [] 2   [x] 3   [] 4   How much help from another person does the patient currently need. .. Total A Lot A Little None   4. Moving to and from a bed to a chair (including a wheelchair)? [x] 1   [] 2   [] 3   [] 4   5. Need to walk in hospital room? [x] 1   [] 2   [] 3   [] 4   6. Climbing 3-5 steps with a railing? [x] 1   [] 2   [] 3   [] 4   © 2007, Trustees of [address] [place] [address] [address] [address], under license to [employer]. All rights reserved      Score:  Initial: 12 Most Recent: X (Date: -- )    Interpretation of Tool:  Represents activities that are increasingly more difficult (i.e. Bed mobility, Transfers, Gait). Score 24 23 22-20 19-15 14-10 9-7 6     Modifier CH CI CJ CK CL CM CN      ? Mobility - Walking and Moving Around:     - CURRENT STATUS: CL - 60%-79% impaired, limited or restricted    - GOAL STATUS: CK - 40%-59% impaired, limited or restricted    - D/C STATUS:  ---------------To be determined---------------  [place] / Product Type: Medicare /      Medical Necessity:     · Patient demonstrates good rehab potential due to higher previous functional level. Reason for Services/Other Comments:  · Patient continues to require skilled intervention due to medical complications, patient unable to attend/participate in therapy as expected and debility with decreased mobility s/p chemotherapy and radiation treatments.    .   Use of outcome tool(s) and clinical judgement create a POC that gives a: Clear prediction of patient's progress: LOW COMPLEXITY            TREATMENT:   (In addition to Assessment/Re-Assessment sessions the following treatments were rendered)   Pre-treatment Symptoms/Complaints:  0/10   Pain: Initial:   Pain Intensity 1: 0 (no specific complaints of pain at this time.   )  Post Session:  0/10      Assessment/Reassessment only, no treatment provided today    Braces/Orthotics/Lines/Etc:   · IV  · trach collar  Treatment/Session Assessment:    · Response to Treatment: Initial evaluation seated edge of bed today with dangling position. · Interdisciplinary Collaboration:   o Physical Therapist  o Registered Nurse  · After treatment position/precautions:   o Supine in bed  o Bed alarm/tab alert on  o Bed/Chair-wheels locked  o Bed in low position  o Call light within reach  o RN notified  o Family at bedside  o Side rails x 3   · Compliance with Program/Exercises: Will assess as treatment progresses. · Recommendations/Intent for next treatment session: \"Next visit will focus on advancements to more challenging activities, reduction in assistance provided and transfers, ambulation and mobility. \".   Total Treatment Duration:  PT Patient Time In/Time Out  Time In: 1905  Time Out: [facility], [address] [facility]

## 2018-02-25 NOTE — PROGRESS NOTES
Patient is alert, sitting up in bed, family @ bedside, patient trach in place, RR even and unlabored, diminished lung sounds bilat, upper coarse, admits to leg pain request pain medication when it is due to be given,  patient denies needs, call light within reach.

## 2018-02-25 NOTE — PROGRESS NOTES
Problem: Falls - Risk of  Goal: *Absence of Falls  Document Mario Fall Risk and appropriate interventions in the flowsheet.    Outcome: Progressing Towards Goal  Fall Risk Interventions:  Mobility Interventions: Bed/chair exit alarm    Mentation Interventions: Bed/chair exit alarm    Medication Interventions: Bed/chair exit alarm    Elimination Interventions: Call light in reach    History of Falls Interventions: Bed/chair exit alarm

## 2018-02-25 NOTE — PROGRESS NOTES
Pt is in room alert and oriented. rr are even and unlabored. Lung sounds are course. Trach in place. O2 sat is 94%. No distress noted. Family at bedside. No increased bleeding or bruising noted. No new skin issues noted. Safety measures in place will continue to monitor.

## 2018-02-25 NOTE — PROGRESS NOTES
Temo Begun  Admission Date: 2/22/2018             Daily Progress Note: 2/25/2018    The patient's chart is reviewed and the patient is discussed with the staff.     Subjective:     On TC  Family is at bed side    Current Facility-Administered Medications   Medication Dose Route Frequency    gabapentin (NEURONTIN) capsule 100 mg  100 mg Per G Tube TID    aspirin chewable tablet 81 mg  81 mg Oral DAILY    clonazePAM (KlonoPIN) tablet 0.5 mg  0.5 mg Oral BID    HYDROcodone-acetaminophen (NORCO)  mg tablet 2 Tab  2 Tab Oral Q6H PRN    levothyroxine (SYNTHROID) tablet 137 mcg  137 mcg Oral ACB    metoprolol tartrate (LOPRESSOR) tablet 25 mg  25 mg Oral BID    mirtazapine (REMERON) tablet 15 mg  15 mg Oral QHS    sertraline (ZOLOFT) tablet 50 mg  50 mg Oral DAILY    sodium chloride (NS) flush 5-10 mL  5-10 mL IntraVENous Q8H    sodium chloride (NS) flush 5-10 mL  5-10 mL IntraVENous PRN    azithromycin (ZITHROMAX) tablet 500 mg  500 mg Oral Q24H    cefTRIAXone (ROCEPHIN) 1 g in 0.9% sodium chloride (MBP/ADV) 50 mL  1 g IntraVENous Q24H    enoxaparin (LOVENOX) injection 40 mg  40 mg SubCUTAneous Q24H    budesonide (PULMICORT) 500 mcg/2 ml nebulizer suspension  500 mcg Nebulization BID RT    methylPREDNISolone (PF) (SOLU-MEDROL) injection 40 mg  40 mg IntraVENous Q6H    dextrose 5% and 0.9% NaCl infusion  100 mL/hr IntraVENous CONTINUOUS       Review of Systems      Constitutional: negative for fever, chills, sweats  Cardiovascular: negative for chest pain, palpitations, syncope, edema  Gastrointestinal:  negative for dysphagia, reflux, vomiting, diarrhea, abdominal pain, or melena  Neurologic:  negative for focal weakness, numbness, headache    Objective:     Vitals:    02/24/18 2328 02/25/18 0349 02/25/18 0741 02/25/18 0820   BP: 105/65 121/71 157/88    Pulse: 73 65 77    Resp: 18 18 18    Temp: 97.5 °F (36.4 °C) 97.9 °F (36.6 °C) 97.5 °F (36.4 °C)    SpO2: 100% 100% 96% 98% Weight:       Height:         Intake and Output:   02/23 1901 - 02/25 0700  In: 2400 [I.V.:2400]  Out: -        Physical Exam:   Constitution:  the patient is well developed and in no acute distress, on TC  EENMT:  Sclera clear, pupils equal, oral mucosa moist  Respiratory: rhonchi  Cardiovascular:  RRR without M,G,R  Gastrointestinal: soft and non-tender; with positive bowel sounds. + PEG  Musculoskeletal: warm without cyanosis. There is no lower leg edema. Skin:  no jaundice or rashes, no wounds   Neurologic: no gross neuro deficits     Psychiatric:  alert and oriented x 3    CXR:             LAB  No results for input(s): GLUCPOC in the last 72 hours. No lab exists for component: [place]   Recent Labs      02/22/18   2158   WBC  11.1   HGB  14.7   HCT  45.4   PLT  354     Recent Labs      02/22/18   2308  02/22/18   2158   NA   --   142   K   --   4.0   CL   --   102   CO2   --   26   GLU   --   180*   BUN   --   38*   CREA   --   1.10   CA   --   8.2*   TROIQ  0.14*   --    ALB   --   3.0*   TBILI   --   0.6   ALT   --   182*   SGOT   --   95*     Recent Labs      02/22/18   2330   PH  7.50*   PCO2  34*   PO2  69*   HCO3  26     No results for input(s): LCAD, LAC in the last 72 hours.          2/24/2018 12:45 PM - Lul, Lab In [facility]       Component Results      Component Value Ref Range & Units Status     Special Requests: suctioned from trach  Preliminary     GRAM STAIN FEW GRAM POSITIVE COCCI   Preliminary     GRAM STAIN FEW GRAM NEGATIVE RODS   Preliminary     GRAM STAIN MODERATE GRAM NEGATIVE COCCI   Preliminary     GRAM STAIN 10 TO 50 WBC'S PER OIF  Preliminary     GRAM STAIN   Preliminary     0 TO 2 EPITHELIAL CELLS PER OIF     Culture result:    Preliminary     HEAVY GRAM NEGATIVE RODS SUBCULTURE IN PROGRESS (A)     Culture result:    Preliminary     MODERATE NORMAL RESPIRATORY MATT       Result History              Assessment:  (Medical Decision Making)     Hospital Problems  Date Reviewed: 2/15/2018          Codes Class Noted POA    * (Principal)Community acquired pneumonia of right upper lobe of lung ([facility] ICD-10-CM: J18.1  ICD-9-CM: 946  2/23/2018 Yes        Laryngeal cancer ([facility] ICD-10-CM: C32.9  ICD-9-CM: 161.9  2/23/2018 Yes        Tracheostomy dependence ([facility] ICD-10-CM: Z93.0  ICD-9-CM: V44.0  2/23/2018 Yes        Hypothyroidism (Chronic) ICD-10-CM: E03.9  ICD-9-CM: 244.9  3/24/2016 Yes        GERD (gastroesophageal reflux disease) (Chronic) ICD-10-CM: K21.9  ICD-9-CM: 530.81  3/24/2016 Yes    Overview Signed 3/21/2016 11:50 AM by Saige Ruth     controlled with medication             Tracheal obstruction ICD-10-CM: J39.8  ICD-9-CM: 519.19  3/24/2016 Yes              Plan:  (Medical Decision Making)     --cont. Rocephin/Zithromax D #3  --stop IVF  --cont. Neurontin  --wean steroids  --am labs and CXR    More than 50% of the time documented was spent in face-to-face contact with the patient and in the care of the patient on the floor/unit where the patient is located.     Liu Woodward MD

## 2018-02-25 NOTE — PROGRESS NOTES
Pt is in room alert and oriented. Marguarite Laundry in place, he can make his needs known through hand gestures and making notes. He is on room air. O2 sat is 94%. He has c/o pain 5/10. Peg in place patent, abd is soft bowel sounds are active. He gets bolus feeds q 4hours. He has no new skin issues noted. Family at bedside. Safety measures in place will continue to monitor.

## 2018-02-26 NOTE — PROGRESS NOTES
Problem: Mobility Impaired (Adult and Pediatric)  Goal: *Therapy Goal (Edit Goal, Insert Text)  STG:  (1.)Mr. Lombardi will move from supine to sit and sit to supine , scoot up and down and roll side to side with CONTACT GUARD ASSIST within 4 treatment day(s). (2.)Mr. Lombardi will transfer from bed to chair and chair to bed with CONTACT GUARD ASSIST using the least restrictive device within 4 treatment day(s). (3.)Mr. Lombardi will ambulate with CONTACT GUARD ASSIST for 200 feet with the least restrictive device within 4 treatment day(s). LTG:  (1.)Mr. Lombardi will move from supine to sit and sit to supine , scoot up and down and roll side to side in bed with STAND BY ASSIST within 7 treatment day(s). (2.)Mr. Lombardi will transfer from bed to chair and chair to bed with STAND BY ASSIST using the least restrictive device within 7 treatment day(s). (3.)Mr. Lombardi will ambulate with STAND BY ASSIST for 500 feet with the least restrictive device within 7 treatment day(s). ________________________________________________________________________________________________       PHYSICAL THERAPY: Daily Note, Treatment Day: 1st, PM 2/26/2018  INPATIENT: Hospital Day: 5  [place] / Product Type: Medicare /      NAME/AGE/GENDER: Maureen Langston is a 79 y.o. male   PRIMARY DIAGNOSIS: CAP (community acquired pneumonia) Community acquired pneumonia of right upper lobe of lung ([facility] Community acquired pneumonia of right upper lobe of lung ([facility]        ICD-10: Treatment Diagnosis:   · Difficulty in walking, Not elsewhere classified (R26.2)   Precaution/Allergies:  Ativan [lorazepam]; Levofloxacin; Other plant, animal, environmental; and Sulfa (sulfonamide antibiotics)      ASSESSMENT:     Mr. Erwin Alvarado presents supine on arrival, drowsy but awakens easily. Family present in room. Pt on trach collar, non verbal this date but nods head in agreement for treatment.  Pt seated to EOB with MIN A. Demo fair static sitting balance at EOB. Pt required MOD to MAX A to scoot to EOB, total A x 2 for low pivot transfer bed to chair. Pt very weak B LEs, B hip flex 1+/5, knee ext 2+/5, DF 3/5. Pt performed seated there ex B LEs, seems motivated for activity and increased strengthening. Pt making progress from evaluation; attempted to assist for transfer bed to chair; however, unable to move self off bed. PT to cont to follow for acute care needs. This section established at most recent assessment   PROBLEM LIST (Impairments causing functional limitations):  1. Decreased Strength  2. Decreased ADL/Functional Activities  3. Decreased Transfer Abilities  4. Decreased Ambulation Ability/Technique  5. Decreased Balance  6. Decreased Activity Tolerance  7. Decreased Pacing Skills  8. Decreased Flexibility/Joint Mobility  9. Decreased Neville with Home Exercise Program   INTERVENTIONS PLANNED: (Benefits and precautions of physical therapy have been discussed with the patient.)  1. Balance Exercise  2. Bed Mobility  3. Family Education  4. Gait Training  5. Home Exercise Program (HEP)  6. Range of Motion (ROM)  7. Therapeutic Activites  8. Therapeutic Exercise/Strengthening  9. Transfer Training     TREATMENT PLAN: Frequency/Duration: 3-5 times a week for duration of hospital stay  Rehabilitation Potential For Stated Goals: Good     RECOMMENDED REHABILITATION/EQUIPMENT: (at time of discharge pending progress): Due to the probability of continued deficits (see above) this patient will likely need continued skilled physical therapy after discharge. Equipment:    Walkers, Type: Rolling Walker              HISTORY:   History of Present Injury/Illness (Reason for Referral): PER MD H&P   Patient is a 79 y.o.   male presents with cough , weakness, excessive trach secretions .     Patient was not able to give any hx , he has trach , he lives at home with hx of Laryngeal cancer , in the lat 24-48 hours , he had more secretions and SOB , he was given Augmentin from ENT but that didn't help      60-year-old male with a history of parotid Adenocarcinoma, tongue cancer, and prostate cancer with tracheostomy s/p chemo and radiation presents with sisters and son for excessive sleepiness today and agitation.  He had constipation last night and had a bowel movement after an enema.  He received his normal nighttime medications last night, and did not wake up as normal today.  Family states that they tried to saline suction his trach, but he became combative and frequently pulled out his inner cannula.  This resulted in a fair amount of bleeding from the tracheostomy.  They called the oncologist and was advised to come to the emergency department. [facility] was seen in the emergency Department February 11 twice in the same day for respiratory distress and mental status changes.  This was thought to be due to transient mucous plugging.  Also had elevation in the troponin that was thought to be rate related due to tachycardia.  He had CT of his head that showed sinusitis, CT of the soft tissue of the neck that showed expected findings from cancer and radiation, as well as a CT chest that was negative for pulmonary embolism.  He was followed up by oncologist Feb 14th and fentanyl patch was decreased from 50 to 25 mcg.  He was continued on Norco as needed for pain, Klonopin for anxiety, and Remeron for sleep.  Ritalin was discontinued as it was thought to have increased anxiety.  Patient currently complaining of chest pain, shortness of breath, throat pain, not feeling well. [facility] has had increasing yellow drainage from bilateral eyes today and has not received his normal eyedrops.  He has been unable to ambulate as usual for the past 2 weeks.  Had decreased urination today.  ENT called in Augmentin yesterday for \"possible infection\" around trach, but was not evaluated in office.     Past Medical History/Comorbidities: Mr. Pasco Cooks  has a past medical history of Airway obstruction (4/20/2016); Allergic rhinitis; Anxiety (3/24/2016); BPH (benign prostatic hypertrophy); Cerumen impaction; Chronic otitis media; Chronic pharyngitis; Condyloma acuminatum; Dyslipidemia (3/24/2016); Dyspnea (4/8/2016); ETD (eustachian tube dysfunction); Former smoker; GERD (gastroesophageal reflux disease); History of condyloma acuminatum; History of parotid cancer ( ); History of sinus cancer (1989); Hypercholesterolemia (9/14/2016); Hypertension; Hypotension (4/21/2016); Hypothyroidism ( ); Ill-defined condition; Impotence of organic origin; Male stress incontinence (12/23/2014); Malignant neoplasm of head, face, and neck ([address] -- prior history.; Malignant neoplasm of parotid gland ([address] (2009); Malignant neoplasm of prostate ([address] Mixed hearing loss; Nasopharyngeal cancer ([address] Neoplasm of uncertain behavior of tongue; Otitis media; Parotid gland adenocarcinoma ([address] Perforation of tympanic membrane; Personal history of prostate cancer; Prostate cancer ([address] SOB (shortness of breath); Stridor (3/24/2016); Tracheal mass -- reproted sub-glotic (3/25/2016); 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 Mr. Pasco Cooks  has a past surgical history that includes hx radical prostatectomy (5/2011); hx gi; hx heent (2009); hx heent (2010); pr sinus surgery proc unlisted (1989); and hx myringotomy (Bilateral, 2010, 2011, 2012).   Social History/Living Environment:   Home Environment: Trailer/mobile home  # Steps to Enter: 9  One/Two Story Residence: Two story, live on 1st floor  # of Interior Steps: [address]: Right  Lift Chair Available: No  Living Alone: No  Support Systems: [employer] / [employer] community, Family member(s)  Patient Expects to be Discharged to[de-identified] [place]/mobile home  Current DME Used/Available at [facility]: [address], [address], [place], [place], [address] [facility], [place], quad  Prior Level of Function/Work/Activity:  Patient with declining functional mobility concurrent with progression of chemotherapy and radiation. Dominant Side:         RIGHT  Personal Factors:          Sex:  male        Age:  79 y.o. Number of Personal Factors/Comorbidities that affect the Plan of Care: 1-2: MODERATE COMPLEXITY   EXAMINATION:   Most Recent Physical Functioning:   Gross Assessment:                  Posture:  Posture Assessment: Forward head, Rounded shoulders  Balance:  Sitting: Impaired  Sitting - Static: Fair (occasional); Good (unsupported)  Sitting - Dynamic: Poor (constant support)  Standing: Impaired  Standing - Static: Constant support;Poor  Standing - Dynamic : Poor Bed Mobility:  Rolling: Contact guard assistance  Supine to Sit: Minimum assistance  Sit to Supine:  (left up in chair)  Scooting: Moderate assistance;Maximum assistance (to EOB)  Wheelchair Mobility:     Transfers:  Sit to Stand: Total assistance;Assist x2  Stand to Sit: Total assistance;Assist x2  Bed to Chair: Total assistance;Assist x2  Gait:            Body Structures Involved:  1. Bones  2. Joints  3. Muscles  4. Ligaments Body Functions Affected:  1. Neuromusculoskeletal  2. Movement Related  3. Skin Related  4. Metobolic/Endocrine Activities and Participation Affected:  1. General Tasks and Demands  2. Communication  3. Mobility  4. Self Care  5. Domestic Life  6. Community, Social and [place]   Number of elements that affect the Plan of Care: 3: MODERATE COMPLEXITY   CLINICAL PRESENTATION:   Presentation: Stable and uncomplicated: LOW COMPLEXITY   CLINICAL DECISION MAKIN hospitals [address] [id number] [id number] 6 Clicks   Basic Mobility Inpatient Short Form  How much difficulty does the patient currently have. .. Unable A Lot A Little None   1. Turning over in bed (including adjusting bedclothes, sheets and blankets)? [] 1   [] 2   [x] 3   [] 4   2.   Sitting down on and standing up from a chair with arms ( e.g., wheelchair, bedside commode, etc.)   [] 1   [] 2   [x] 3   [] 4   3. Moving from lying on back to sitting on the side of the bed? [] 1   [] 2   [x] 3   [] 4   How much help from another person does the patient currently need. .. Total A Lot A Little None   4. Moving to and from a bed to a chair (including a wheelchair)? [x] 1   [] 2   [] 3   [] 4   5. Need to walk in hospital room? [x] 1   [] 2   [] 3   [] 4   6. Climbing 3-5 steps with a railing? [x] 1   [] 2   [] 3   [] 4   © 2007, Trustees of [employer], under license to [employer]. All rights reserved      Score:  Initial: 12 Most Recent: X (Date: -- )    Interpretation of Tool:  Represents activities that are increasingly more difficult (i.e. Bed mobility, Transfers, Gait). Score 24 23 22-20 19-15 14-10 9-7 6     Modifier CH CI CJ CK CL CM CN      ? Mobility - Walking and Moving Around:     - CURRENT STATUS: CL - 60%-79% impaired, limited or restricted    - GOAL STATUS: CK - 40%-59% impaired, limited or restricted    - D/C STATUS:  ---------------To be determined---------------  [place] / [place] / Product Type: Medicare /      Medical Necessity:     · Patient demonstrates good rehab potential due to higher previous functional level. Reason for Services/Other Comments:  · Patient continues to require skilled intervention due to medical complications, patient unable to attend/participate in therapy as expected and debility with decreased mobility s/p chemotherapy and radiation treatments.    .   Use of outcome tool(s) and clinical judgement create a POC that gives a: Clear prediction of patient's progress: LOW COMPLEXITY            TREATMENT:   (In addition to Assessment/Re-Assessment sessions the following treatments were rendered)   Pre-treatment Symptoms/Complaints:  Nods in agreement to PT   Pain: Initial:   Pain Intensity 1: 0 (per RN, has had medication)  Post Session:  0/10      Therapeutic Activity: ( 13 min): Therapeutic activities including Bed transfers, Chair transfers, sit to stand transfers, weight shifting, static sitting balance to improve mobility, strength, balance and coordination. Required maximal tactile and verbal cues   to promote static and dynamic balance in standing and promote coordination of bilateral, lower extremity(s). Date:  2/26/18 Date:   Date:     Activity/Exercise Parameters Parameters Parameters   Quad sets X 10 B     AP X 10 B     LAQ X 8 B                               Braces/Orthotics/Lines/Etc:   · IV  · trach collar  Treatment/Session Assessment:    · Response to Treatment:  Total assist x 2 for transfers      · Interdisciplinary Collaboration:   o Physical Therapist  o Registered Nurse  · After treatment position/precautions:   o Up in chair  o Bed/Chair-wheels locked  o Bed in low position  o Call light within reach  o RN notified  o Family at bedside   · Compliance with Program/Exercises: Will assess as treatment progresses. · Recommendations/Intent for next treatment session: \"Next visit will focus on advancements to more challenging activities, reduction in assistance provided and transfers, ambulation and mobility. \".   Total Treatment Duration:  PT Patient Time In/Time Out  Time In: 1341  Time Out: [address], PT

## 2018-02-26 NOTE — PROGRESS NOTES
Patient is sitting up in bed, RR even and unlabored, trach in place, clean dry and intact, humidity to trach, patient HOB elevated, denies pain and discomfort, denies needs, call light within reach, family @ bedside.

## 2018-02-26 NOTE — PROGRESS NOTES
Pt given 0800 bolus feed, 1.5 bottle of Osmolite with 180 water flush. Pt tolerated well.  HOB >30 degrees

## 2018-02-26 NOTE — PROGRESS NOTES
Patient has been resting quietly throughout night, suctioned periodically upon request with little output, patient without c/o pain or discomfort, denies needs, call light within reach.

## 2018-02-26 NOTE — PROGRESS NOTES
Maureen Langston  Admission Date: 2/22/2018             Daily Progress Note: 2/26/2018    The patient's chart is reviewed and the patient is discussed with the staff. 70yo AAM with h/o laryngeal cancer, permanent trach, admitted for change in secretions and SOB. CXR revealing right sided PNA. Culture back with pseudomonas. Subjective:     Patient's sputum culture resulted with pseudomonas this morning. Continues on TC O2 at 28%. No new changes. Current Facility-Administered Medications   Medication Dose Route Frequency    piperacillin-tazobactam (ZOSYN) 4.5 g in 0.9% sodium chloride (MBP/ADV) 100 mL  4.5 g IntraVENous Q8H    methylPREDNISolone (PF) (SOLU-MEDROL) injection 40 mg  40 mg IntraVENous Q12H    gabapentin (NEURONTIN) capsule 100 mg  100 mg Per G Tube TID    aspirin chewable tablet 81 mg  81 mg Oral DAILY    clonazePAM (KlonoPIN) tablet 0.5 mg  0.5 mg Oral BID    HYDROcodone-acetaminophen (NORCO)  mg tablet 2 Tab  2 Tab Oral Q6H PRN    levothyroxine (SYNTHROID) tablet 137 mcg  137 mcg Oral ACB    metoprolol tartrate (LOPRESSOR) tablet 25 mg  25 mg Oral BID    mirtazapine (REMERON) tablet 15 mg  15 mg Oral QHS    sertraline (ZOLOFT) tablet 50 mg  50 mg Oral DAILY    sodium chloride (NS) flush 5-10 mL  5-10 mL IntraVENous Q8H    sodium chloride (NS) flush 5-10 mL  5-10 mL IntraVENous PRN    enoxaparin (LOVENOX) injection 40 mg  40 mg SubCUTAneous Q24H    budesonide (PULMICORT) 500 mcg/2 ml nebulizer suspension  500 mcg Nebulization BID RT       Review of Systems  Unobtainable due to patient status.     Objective:     Vitals:    02/26/18 0754 02/26/18 0815 02/26/18 0856 02/26/18 1130   BP: (!) 177/100 155/82  131/80   Pulse: 81   80   Resp: 18   16   Temp: 98.3 °F (36.8 °C)   98.2 °F (36.8 °C)   SpO2: 100%  100% 100%   Weight:       Height:         Intake and Output:   02/24 1901 - 02/26 0700  In: 1200 [I.V.:1200]  Out: -        Physical Exam:   Constitution: the patient is thin and frail appearing. EENMT:  Sclera clear, pupils equal, oral mucosa moist  Respiratory: Trach in place. R>L rhonchi. Cardiovascular:  RRR without M,G,R  Gastrointestinal: soft and non-tender; with positive bowel sounds. Musculoskeletal: warm without cyanosis. There is no lower leg edema. Skin:  no jaundice or rashes, no wounds   Neurologic: Some left sided weakness in face. Psychiatric:  alert nods to questions. Unable to speak. CHEST XRAY:   2/26/18  IMPRESSION: Right upper lobe atelectasis or pneumonia worse in the interval.      LAB  No lab exists for component: GLPOC   Recent Labs      02/26/18   0845   WBC  7.2   HGB  11.5*   HCT  35.5*   PLT  262     Recent Labs      02/26/18   0845   NA  142   K  3.9   CL  105   CO2  29   GLU  112*   BUN  18   CREA  0.41*   MG  1.7*   CA  8.3   PHOS  1.9*     No results for input(s): PH, PCO2, PO2, HCO3 in the last 72 hours. MICRO  Blood - NGTD  Sputum - Heavy pseudomonas    Assessment:  (Medical Decision Making)     Hospital Problems  Date Reviewed: 2/15/2018          Codes Class Noted POA    * (Principal)Community acquired pneumonia of right upper lobe of lung ([facility] ICD-10-CM: J18.1  ICD-9-CM: 776  2/23/2018 Yes        Laryngeal cancer ([facility] ICD-10-CM: C32.9  ICD-9-CM: 161.9  2/23/2018 Yes        Tracheostomy dependence ([facility] ICD-10-CM: Z93.0  ICD-9-CM: V44.0  2/23/2018 Yes        Hypothyroidism (Chronic) ICD-10-CM: E03.9  ICD-9-CM: 244.9  3/24/2016 Yes        GERD (gastroesophageal reflux disease) (Chronic) ICD-10-CM: K21.9  ICD-9-CM: 530.81  3/24/2016 Yes    Overview Signed 3/21/2016 11:50 AM by Dilcia Flores     controlled with medication             Tracheal obstruction ICD-10-CM: J39.8  ICD-9-CM: 519.19  3/24/2016 Yes            Patient with PNA in setting of chronic trach for laryngeal cancer. Now back as pseudomonas. Reviewed with him that typically treat for 7-14 days if not longer with this organism.  Sensitive to FQ unfortunately he is allergic to these (though the allergy listed is muscle cramps). Plan:  (Medical Decision Making)   -will change cef/azithro to zosyn for now.   -discuss with pharmacy. May be worth a trial of FQ as this is the only oral option available to him. Otherwise he may need extended hospital stay or PICC line for IV abx.   -PT/OT.        Ravi Anthony MD

## 2018-02-27 PROBLEM — K21.9 GERD (GASTROESOPHAGEAL REFLUX DISEASE): Chronic | Status: ACTIVE | Noted: 2018-01-01

## 2018-02-27 PROBLEM — E03.9 HYPOTHYROIDISM: Chronic | Status: ACTIVE | Noted: 2018-01-01

## 2018-02-27 PROBLEM — J39.8 TRACHEAL OBSTRUCTION: Status: ACTIVE | Noted: 2018-01-01

## 2018-02-27 PROBLEM — K59.00 CONSTIPATION: Chronic | Status: ACTIVE | Noted: 2018-01-01

## 2018-02-27 NOTE — PROGRESS NOTES
Patient is alert and oriented X4, sitting up in bed, sister @ bedside, patient trach collar to humidity, IV antibiotics infusing, patient denies pain and discomfort, denies needs @ this time, call light within reach.

## 2018-02-27 NOTE — PROGRESS NOTES
Problem: Mobility Impaired (Adult and Pediatric)  Goal: *Therapy Goal (Edit Goal, Insert Text)  STG:  (1.)Mr. Lombardi will move from supine to sit and sit to supine , scoot up and down and roll side to side with CONTACT GUARD ASSIST within 4 treatment day(s). (2.)Mr. Lombardi will transfer from bed to chair and chair to bed with CONTACT GUARD ASSIST using the least restrictive device within 4 treatment day(s). (3.)Mr. Lombardi will ambulate with CONTACT GUARD ASSIST for 200 feet with the least restrictive device within 4 treatment day(s). LTG:  (1.)Mr. Lombardi will move from supine to sit and sit to supine , scoot up and down and roll side to side in bed with STAND BY ASSIST within 7 treatment day(s). (2.)Mr. Lombardi will transfer from bed to chair and chair to bed with STAND BY ASSIST using the least restrictive device within 7 treatment day(s). (3.)Mr. Lombardi will ambulate with STAND BY ASSIST for 500 feet with the least restrictive device within 7 treatment day(s). ________________________________________________________________________________________________       PHYSICAL THERAPY: Daily Note, Treatment Day: 2nd, PM 2/27/2018  INPATIENT: Hospital Day: 6  [place] / Product Type: Medicare /      NAME/AGE/GENDER: Nazario Corey is a 79 y.o. male   PRIMARY DIAGNOSIS: CAP (community acquired pneumonia) Community acquired pneumonia of right upper lobe of lung ([facility] Community acquired pneumonia of right upper lobe of lung ([facility]        ICD-10: Treatment Diagnosis:   · Difficulty in walking, Not elsewhere classified (R26.2)   Precaution/Allergies:  Ativan [lorazepam]; Levofloxacin; Other plant, animal, environmental; and Sulfa (sulfonamide antibiotics)      ASSESSMENT:     Mr. Pam Bearden presents today supine in bed with his sister at the bedside providing support and care. He was agreeable to therapy.   He has a trach but can communicate with yes/no questions and gestures. He participated in BLE AROM exercises in supine and sitting. 2 x 10 reps. Supine to sit was minimal assist with fair static sitting balance on the edge of the bed. Sit to stand with use of bed elevation was moderate assist using the r/walker for BUE support. He was able to maintain standing at the bedside ~45 seconds to 1 minute before needing to sit. He stood 3 times with moderate assist with the bed eleveated and on his last stand he attempted to step in place 3-4 steps. His BLE strength remains too weak and his steps are unsteady and slightly uncontrolled. He returned to supine gesturing that he needed to be suctioned. PT offered to get him up in a bedside chair after being suctioned for prolonged sitting time. This section established at most recent assessment   PROBLEM LIST (Impairments causing functional limitations):  1. Decreased Strength  2. Decreased ADL/Functional Activities  3. Decreased Transfer Abilities  4. Decreased Ambulation Ability/Technique  5. Decreased Balance  6. Decreased Activity Tolerance  7. Decreased Pacing Skills  8. Decreased Flexibility/Joint Mobility  9. Decreased [place] with Home Exercise Program   INTERVENTIONS PLANNED: (Benefits and precautions of physical therapy have been discussed with the patient.)  1. Balance Exercise  2. Bed Mobility  3. Family Education  4. Gait Training  5. Home Exercise Program (HEP)  6. Range of Motion (ROM)  7. Therapeutic Activites  8. Therapeutic Exercise/Strengthening  9. Transfer Training     TREATMENT PLAN: Frequency/Duration: 3-5 times a week for duration of hospital stay  Rehabilitation Potential For Stated Goals: Good     RECOMMENDED REHABILITATION/EQUIPMENT: (at time of discharge pending progress): Due to the probability of continued deficits (see above) this patient will likely need continued skilled physical therapy after discharge.   Equipment:    Walkers, Type: Rolling Walker              HISTORY: History of Present Injury/Illness (Reason for Referral): PER MD H&P   Patient is a 79 y.o.   male presents with cough , weakness, excessive trach secretions .     Patient was not able to give any hx , he has trach , he lives at home with hx of Laryngeal cancer , in the lat 24-48 hours , he had more secretions and SOB , he was given Augmentin from ENT but that didn't help      26-year-old male with a history of parotid Adenocarcinoma, tongue cancer, and prostate cancer with tracheostomy s/p chemo and radiation presents with sisters and son for excessive sleepiness today and agitation.  He had constipation last night and had a bowel movement after an enema.  He received his normal nighttime medications last night, and did not wake up as normal today.  Family states that they tried to saline suction his trach, but he became combative and frequently pulled out his inner cannula.  This resulted in a fair amount of bleeding from the tracheostomy.  They called the oncologist and was advised to come to the emergency department. Carlos Alberto Solorzano was seen in the emergency Department February 11 twice in the same day for respiratory distress and mental status changes.  This was thought to be due to transient mucous plugging.  Also had elevation in the troponin that was thought to be rate related due to tachycardia.  He had CT of his head that showed sinusitis, CT of the soft tissue of the neck that showed expected findings from cancer and radiation, as well as a CT chest that was negative for pulmonary embolism.  He was followed up by oncologist Feb 14th and fentanyl patch was decreased from 50 to 25 mcg.  He was continued on Norco as needed for pain, Klonopin for anxiety, and Remeron for sleep.  Ritalin was discontinued as it was thought to have increased anxiety.  Patient currently complaining of chest pain, shortness of breath, throat pain, not feeling well. Carlos Alberto Solorzano has had increasing yellow drainage from bilateral eyes today and has not received his normal eyedrops.  He has been unable to ambulate as usual for the past 2 weeks.  Had decreased urination today. ENT called in Augmentin yesterday for \"possible infection\" around trach, but was not evaluated in office.     Past Medical History/Comorbidities:   Mr. Lasha Dutta  has a past medical history of Airway obstruction (4/20/2016); Allergic rhinitis; Anxiety (3/24/2016); BPH (benign prostatic hypertrophy); Cerumen impaction; Chronic otitis media; Chronic pharyngitis; Condyloma acuminatum; Dyslipidemia (3/24/2016); Dyspnea (4/8/2016); ETD (eustachian tube dysfunction); Former smoker; GERD (gastroesophageal reflux disease); History of condyloma acuminatum; History of parotid cancer ( ); History of sinus cancer (1989); Hypercholesterolemia (9/14/2016); Hypertension; Hypotension (4/21/2016); Hypothyroidism ( ); Ill-defined condition; Impotence of organic origin; Male stress incontinence (12/23/2014); Malignant neoplasm of head, face, and neck ([address] -- prior history.; Malignant neoplasm of parotid gland ([address] (2009); Malignant neoplasm of prostate ([address] Mixed hearing loss; Nasopharyngeal cancer ([address] Neoplasm of uncertain behavior of tongue; Otitis media; Parotid gland adenocarcinoma ([address] Perforation of tympanic membrane; Personal history of prostate cancer; Prostate cancer ([address] SOB (shortness of breath); Stridor (3/24/2016); Tracheal mass -- reproted sub-glotic (3/25/2016); Tracheal obstruction (3/24/2016); Tracheostomy in place [facility]) (6/20/2016); and Vocal cord paralysis (4/20/2016). Mr. Lasha Dutta  has a past surgical history that includes hx radical prostatectomy (5/2011); hx gi; hx heent (2009); hx heent (2010); pr sinus surgery proc unlisted (1989); and hx myringotomy (Bilateral, 2010, 2011, 2012).   Social History/Living Environment:   Home Environment: Trailer/mobile home  # Steps to Enter: 9  One/Two Story Residence: Two story, live on 1st floor  # of Interior Steps: [address]: Right  Lift Chair Available: No  Living Alone: No  Support Systems: Mandaeism / dayan community, Family member(s)  Patient Expects to be Discharged to[de-identified] Trailer/mobile home  Current DME Used/Available at [facility]: Nebulizer, Sherrill Bonner, straight, Sherrill Bonner, quad  Prior Level of Function/Work/Activity:  Patient with declining functional mobility concurrent with progression of chemotherapy and radiation. Dominant Side:         RIGHT  Personal Factors:          Sex:  male        Age:  79 y.o. Number of Personal Factors/Comorbidities that affect the Plan of Care: 1-2: MODERATE COMPLEXITY   EXAMINATION:   Most Recent Physical Functioning:   Gross Assessment:                  Posture:     Balance:  Sitting: Impaired  Sitting - Static: Fair (occasional)  Sitting - Dynamic: Fair (occasional)  Standing: Impaired  Standing - Static: Fair;Constant support  Standing - Dynamic : Poor Bed Mobility:  Rolling: Contact guard assistance  Supine to Sit: Minimum assistance  Sit to Supine: Minimum assistance  Scooting: Minimum assistance  Wheelchair Mobility:     Transfers:  Sit to Stand: Moderate assistance (with bed elevation)  Stand to Sit: Minimum assistance  Gait:            Body Structures Involved:  1. Bones  2. Joints  3. Muscles  4. Ligaments Body Functions Affected:  1. Neuromusculoskeletal  2. Movement Related  3. Skin Related  4. Metobolic/Endocrine Activities and Participation Affected:  1. General Tasks and Demands  2. Communication  3. Mobility  4. Self Care  5. Domestic Life  6. Community, Social and [place]   Number of elements that affect the Plan of Care: 3: MODERATE COMPLEXITY   CLINICAL PRESENTATION:   Presentation: Stable and uncomplicated: LOW COMPLEXITY   CLINICAL DECISION MAKIN [facility] [address] [id number] AM-PAC 6 Clicks   Basic Mobility Inpatient Short Form  How much difficulty does the patient currently have. .. Unable A Lot A Little None   1.   Turning over in bed (including adjusting bedclothes, sheets and blankets)? [] 1   [] 2   [x] 3   [] 4   2. Sitting down on and standing up from a chair with arms ( e.g., wheelchair, bedside commode, etc.)   [] 1   [] 2   [x] 3   [] 4   3. Moving from lying on back to sitting on the side of the bed? [] 1   [] 2   [x] 3   [] 4   How much help from another person does the patient currently need. .. Total A Lot A Little None   4. Moving to and from a bed to a chair (including a wheelchair)? [x] 1   [] 2   [] 3   [] 4   5. Need to walk in hospital room? [x] 1   [] 2   [] 3   [] 4   6. Climbing 3-5 steps with a railing? [x] 1   [] 2   [] 3   [] 4   © 2007, Trustees of [address] [place], [place] [id number], under license to [employer]. All rights reserved      Score:  Initial: 12 Most Recent: X (Date: -- )    Interpretation of Tool:  Represents activities that are increasingly more difficult (i.e. Bed mobility, Transfers, Gait). Score 24 23 22-20 19-15 14-10 9-7 6     Modifier CH CI CJ CK CL CM CN      ? Mobility - Walking and Moving Around:     - CURRENT STATUS: CL - 60%-79% impaired, limited or restricted    - GOAL STATUS: CK - 40%-59% impaired, limited or restricted    - D/C STATUS:  ---------------To be determined---------------  [place] / [place] / Product Type: Medicare /      Medical Necessity:     · Patient demonstrates good rehab potential due to higher previous functional level. Reason for Services/Other Comments:  · Patient continues to require skilled intervention due to medical complications, patient unable to attend/participate in therapy as expected and debility with decreased mobility s/p chemotherapy and radiation treatments.    .   Use of outcome tool(s) and clinical judgement create a POC that gives a: Clear prediction of patient's progress: LOW COMPLEXITY            TREATMENT:   (In addition to Assessment/Re-Assessment sessions the following treatments were rendered)   Pre-treatment Symptoms/Complaints:  Gestured that he needs to be suctioned. Pain: Initial:   Pain Intensity 1: 0  Post Session:  0/10      Therapeutic Activity: (   25 min): Therapeutic activities including Bed transfers, Chair transfers, sit to stand,standing tolerance transfers, weight shifting, static sitting balance to improve mobility, strength, balance and coordination. Required maximal tactile and verbal cues   to promote static and dynamic balance in standing and promote coordination of bilateral, lower extremity(s). Date:  2/26/18 Date:  2/27/18 Date:     Activity/Exercise Parameters Parameters Parameters   Quad sets X 10 B     AP X 10 B 2 x 10    LAQ X 8 B 2 x 10    Heel slides in supine  2 x 10    Hip abd/adduction in supine  2 x 10    AA SLR  10 B    Sit to stand   3 times with use of bed elevation      Braces/Orthotics/Lines/Etc:   · IV  · trach collar  Treatment/Session Assessment:    · Response to Treatment:  Total assist x 2 for transfers      · Interdisciplinary Collaboration:   o Physical Therapist  o Registered Nurse  · After treatment position/precautions:   o Supine in bed  o Bed/Chair-wheels locked  o Bed in low position  o Call light within reach  o RN notified  o Family at bedside   · Compliance with Program/Exercises: Will assess as treatment progresses. · Recommendations/Intent for next treatment session: \"Next visit will focus on advancements to more challenging activities, reduction in assistance provided and transfers, ambulation and mobility. \".   Total Treatment Duration:  PT Patient Time In/Time Out  Time In: 1257  Time Out: Yuli Bustillo

## 2018-02-27 NOTE — PROGRESS NOTES
Patient has rested quietly throughout night, without c/o pain or discomfort, IV antibiotics infusing, call light within reach.

## 2018-02-27 NOTE — PROGRESS NOTES
Assessment completed.  Patient is alert and oriented X4, sleeping, sister @ bedside, patient trach collar to humidity,patient denies pain and discomfort, denies needs @ this time, call light within reach

## 2018-02-27 NOTE — PROGRESS NOTES
Patient X3 times tonight upon request, lung sounds clear, little mucus from suctioning with pink tinge

## 2018-02-27 NOTE — PROGRESS NOTES
May Height  Admission Date: 2/22/2018             Daily Progress Note: 2/27/2018    The patient's chart is reviewed and the patient is discussed with the staff. 68yo AAM with h/o laryngeal cancer, permanent trach, admitted for change in secretions and SOB. CXR revealing right sided PNA. Culture back with pseudomonas. He has chronic trach for laryngeal cancer. Reviewed with him that typically treat for 7-14 days if not longer with this organism. Sensitive to FQ unfortunately he is allergic to these (though the allergy listed is muscle cramps). Subjective:     Resting in bed, remains on trach collar and complaining of constipation. Significant tan colored tracheal secretions. Current Facility-Administered Medications   Medication Dose Route Frequency    piperacillin-tazobactam (ZOSYN) 4.5 g in 0.9% sodium chloride (MBP/ADV) 100 mL  4.5 g IntraVENous Q8H    methylPREDNISolone (PF) (SOLU-MEDROL) injection 40 mg  40 mg IntraVENous Q12H    gabapentin (NEURONTIN) capsule 100 mg  100 mg Per G Tube TID    aspirin chewable tablet 81 mg  81 mg Oral DAILY    clonazePAM (KlonoPIN) tablet 0.5 mg  0.5 mg Oral BID    HYDROcodone-acetaminophen (NORCO)  mg tablet 2 Tab  2 Tab Oral Q6H PRN    levothyroxine (SYNTHROID) tablet 137 mcg  137 mcg Oral ACB    metoprolol tartrate (LOPRESSOR) tablet 25 mg  25 mg Oral BID    mirtazapine (REMERON) tablet 15 mg  15 mg Oral QHS    sertraline (ZOLOFT) tablet 50 mg  50 mg Oral DAILY    sodium chloride (NS) flush 5-10 mL  5-10 mL IntraVENous Q8H    sodium chloride (NS) flush 5-10 mL  5-10 mL IntraVENous PRN    enoxaparin (LOVENOX) injection 40 mg  40 mg SubCUTAneous Q24H    budesonide (PULMICORT) 500 mcg/2 ml nebulizer suspension  500 mcg Nebulization BID RT       Review of Systems  Unobtainable due to patient status.     Objective:     Vitals:    02/26/18 2324 02/27/18 0441 02/27/18 0750 02/27/18 0851   BP: (!) 175/97 145/82 143/86 Pulse: 92 74 72    Resp: 20 20 16    Temp: 98.2 °F (36.8 °C) 98.3 °F (36.8 °C) 97.6 °F (36.4 °C)    SpO2: 100% 98% 99% 98%   Weight:       Height:         Intake and Output:   02/25 1901 - 02/27 0700  In: 240 [P.O.:240]  Out: -        Physical Exam:   Constitution:  the patient is thin and frail appearing, TC 28%. EENMT:  Sclera clear, pupils equal, oral mucosa moist, trach  Respiratory: Trach in place. R>L rhonchi, significant tracheal secretions tan/pink. Cardiovascular:  RRR without M,G,R  Gastrointestinal: soft, flat with positive bowel sounds, PEG and receives bolus TFs, constipated. Musculoskeletal: warm without cyanosis. There is no lower leg edema. Skin:  no jaundice or rashes, no wounds   Neurologic: Some left sided weakness in face. Psychiatric:  alert nods to questions. Unable to speak. CXR:  None today     CHEST XRAY 2/26/18:  Right upper lobe atelectasis or pneumonia worse in the interval.      LAB  No lab exists for component: GLPOC   Recent Labs      02/26/18   0845   WBC  7.2   HGB  11.5*   HCT  35.5*   PLT  262     Recent Labs      02/26/18   0845   NA  142   K  3.9   CL  105   CO2  29   GLU  112*   BUN  18   CREA  0.41*   MG  1.7*   CA  8.3   PHOS  1.9*     No results for input(s): PH, PCO2, PO2, HCO3 in the last 72 hours.     MICRO  Blood - NGTD  Sputum - Heavy pseudomonas    Assessment:  (Medical Decision Making)     Hospital Problems  Date Reviewed: 2/27/2018          Codes Class Noted POA    Constipation (Chronic) ICD-10-CM: K59.00  ICD-9-CM: 564.00  2/27/2018 Unknown    Add laxatives    Hypothyroidism (Chronic) ICD-10-CM: E03.9  ICD-9-CM: 244.9  2/23/2018 Yes    chronic    GERD (gastroesophageal reflux disease) (Chronic) ICD-10-CM: K21.9  ICD-9-CM: 530.81  2/23/2018 Yes     controlled with medication         controlled    Tracheal obstruction ICD-10-CM: J39.8  ICD-9-CM: 519.19  2/23/2018 Yes    Tolerating TC    * (Principal)Community acquired pneumonia of right upper lobe of lung [facility]) ICD-10-CM: J18.1  ICD-9-CM: 473  2/23/2018 Yes    Continue antibiotics--pseudomonas    Laryngeal cancer ([facility] ICD-10-CM: C32.9  ICD-9-CM: 161.9  2/23/2018 Yes    Chemo 2 weeks ago    Tracheostomy dependence ([facility] [address] ICD-10-CM: Z93.0  ICD-9-CM: V44.0  2/23/2018 Yes    unchanged            Plan:  (Medical Decision Making)     --Pulmicort  --Solu Medrol 40mg q12h  --Zosyn day 2   --May need extended hospital stay or PICC line for IV abx if cannot tolerate flouroquinolones.    --PT/OT  --Laxatives and stool softeners    Ines Werner NP      Lungs:  Mild B rhonchi. Heart:  RRR with no Murmur/Rubs/Gallops    Additional Comments:    Patient with chronic trach for head/neck cancer, admitted with RUL pna. Cultures growing pseudomonas. Sensitive to levaquin. Allergy listed as muscle cramps. He is willing to try levaquin again and monitor tolerance as this is the only oral option to treat this, and he likely needs 2-3 weeks of therapy. Zosyn changed to levaquin, monitor symptoms. Plan at least 14-21 days. I have spoken with and examined the patient. I agree with the above assessment and plan as documented.     Latricia Hamman, MD

## 2018-02-27 NOTE — PROGRESS NOTES
Pt given two 5 mg Norco PO crushed via PEG for abd pain.  Pt sister states she believes his stomach is upset from the new antibiotic he is receiving, will mention to MD.

## 2018-02-28 NOTE — PROGRESS NOTES
Rika [facility] Pulmonology without any response. Patient was able to tolerate PRN Percocet to sustain increased bowel movements and to subdue stomach cramps. Bowel movements lessened but at this time patient is still complaining of cramps. Vital signs stable. Call light within reach. Will continue to monitor.

## 2018-02-28 NOTE — PROGRESS NOTES
PT treatment attempted but the patient indicated that he did not want therapy at this time. His sister asked if we can check back in a while to help get him up into the recliner, so PT will recheck a little later.     Janet Gowers, ASHLEYT

## 2018-02-28 NOTE — PROGRESS NOTES
[facility] Patient sitting in bed with sister at bedside. Patient gave hand gesture indicating he needed to be suctioned. 15 F used for suction. Sister helped. Noted small amounts of brown tinged sputum upon suctioning. Patient tolerated well. SAT level remained at 95%. PEG tube in place, noted small amounts of black substance. GI aware of this. Patient able to follow verbal commands and uses hand signals to indicate needs. No distress at this time. Will continue to monitor.

## 2018-02-28 NOTE — PROGRESS NOTES
Pt is lying in bed, sister present at bedside. Pt appears in no acute distress at this time. Pt has trach, in place. Pt is on 5.5L O2 for hydration. Pt has PEG in place. Pt bottom lip swollen. Pt and sister encouraged to call for assistance when needed, call light in reach. Safety measures in place. Will continue to monitor.

## 2018-02-28 NOTE — PROGRESS NOTES
Nahid Lambert  Admission Date: 2/22/2018             Daily Progress Note: 2/28/2018    The patient's chart is reviewed and the patient is discussed with the staff. 70yo AAM with h/o laryngeal cancer, permanent trach, admitted for change in secretions and SOB. CXR revealing right sided PNA. Culture back with pseudomonas. He has chronic trach for laryngeal cancer. Reviewed with him that typically treat for 7-14 days if not longer with this organism. Sensitive to FQ unfortunately he is allergic to these (though the allergy listed is muscle cramps). Subjective:     Resting in bed, remains on trach collar and denies shortness of breath. Had multiple BMs last night and stomach feeling better. Still tan colored tracheal secretions but less. Thinks he is tolerating Levaquin.       Current Facility-Administered Medications   Medication Dose Route Frequency    levoFLOXacin (LEVAQUIN) 750 mg in D5W IVPB  750 mg IntraVENous Q24H    polyethylene glycol (MIRALAX) packet 17 g  17 g Oral DAILY    docusate (COLACE) 50 mg/5 mL oral liquid 100 mg  100 mg Oral BID    methylPREDNISolone (PF) (SOLU-MEDROL) injection 40 mg  40 mg IntraVENous Q12H    gabapentin (NEURONTIN) capsule 100 mg  100 mg Per G Tube TID    aspirin chewable tablet 81 mg  81 mg Oral DAILY    clonazePAM (KlonoPIN) tablet 0.5 mg  0.5 mg Oral BID    HYDROcodone-acetaminophen (NORCO)  mg tablet 2 Tab  2 Tab Oral Q6H PRN    levothyroxine (SYNTHROID) tablet 137 mcg  137 mcg Oral ACB    metoprolol tartrate (LOPRESSOR) tablet 25 mg  25 mg Oral BID    mirtazapine (REMERON) tablet 15 mg  15 mg Oral QHS    sertraline (ZOLOFT) tablet 50 mg  50 mg Oral DAILY    sodium chloride (NS) flush 5-10 mL  5-10 mL IntraVENous Q8H    sodium chloride (NS) flush 5-10 mL  5-10 mL IntraVENous PRN    enoxaparin (LOVENOX) injection 40 mg  40 mg SubCUTAneous Q24H    budesonide (PULMICORT) 500 mcg/2 ml nebulizer suspension  500 mcg Nebulization BID RT       Review of Systems  Unobtainable due to patient status. Objective:     Vitals:    02/28/18 0342 02/28/18 0726 02/28/18 0735 02/28/18 0800   BP: (!) 156/98  (!) 167/111 154/86   Pulse: 78  80    Resp: 20  20    Temp: 97.7 °F (36.5 °C)  98.4 °F (36.9 °C)    SpO2: 99% 98% 98%    Weight:       Height:         Intake and Output:           Physical Exam:   Constitution:  the patient is thin and frail appearing, TC 28%. EENMT:  Sclera clear, pupils equal, oral mucosa moist, trach  Respiratory: Trach in place. R>L rhonchi, less tracheal secretions tan   Cardiovascular:  RRR without M,G,R  Gastrointestinal: soft, flat with positive bowel sounds, PEG and receives bolus TFs, having BMs. Musculoskeletal: warm without cyanosis. There is no lower leg edema. Skin:  no jaundice or rashes, no wounds   Neurologic: Some left sided weakness in face. Psychiatric:  alert nods to questions. Unable to speak. CXR:  None today     CXR 2/26/18:  Right upper lobe atelectasis or pneumonia worse in the interval.      LAB  No lab exists for component: GLPOC   Recent Labs      02/26/18   0845   WBC  7.2   HGB  11.5*   HCT  35.5*   PLT  262     Recent Labs      02/26/18   0845   NA  142   K  3.9   CL  105   CO2  29   GLU  112*   BUN  18   CREA  0.41*   MG  1.7*   CA  8.3   PHOS  1.9*     No results for input(s): PH, PCO2, PO2, HCO3 in the last 72 hours.     MICRO  Blood - NGTD  Sputum - Heavy pseudomonas    Assessment:  (Medical Decision Making)     Hospital Problems  Date Reviewed: 2/27/2018          Codes Class Noted POA    Constipation (Chronic) ICD-10-CM: K59.00  ICD-9-CM: 564.00  2/27/2018 Unknown    Had BMs    Hypothyroidism (Chronic) ICD-10-CM: E03.9  ICD-9-CM: 244.9  2/23/2018 Yes    chronic    GERD (gastroesophageal reflux disease) (Chronic) ICD-10-CM: K21.9  ICD-9-CM: 530.81  2/23/2018 Yes     controlled with medication         controlled    Tracheal obstruction ICD-10-CM: J39.8  ICD-9-CM: 519.19 2/23/2018 Yes    Tolerating TC    * (Principal)[facility] ICD-10-CM: J18.1  ICD-9-CM: 914  2/23/2018 Yes    Tolerating Levaquin --pseudomonas    [facility] ([facility] ICD-10-CM: C32.9  ICD-9-CM: 161.9  2/23/2018 Yes    Chemo 2 weeks ago    Tracheostomy dependence ([facility] ICD-10-CM: Z93.0  ICD-9-CM: V44.0  2/23/2018 Yes    unchanged            Plan:  (Medical Decision Making)     --Pulmicort  --Solu Medrol 40mg i85a--cpaorr to PO  --Levaquin day 2--will likely need 14-21 day course  --Respiratory culture 2/22:  HEAVY PSEUDOMONAS AERUGINOSA   --May need extended hospital stay or PICC line for IV abx if cannot tolerate flouroquinolones.    --PT/OT assisting  --Possible discharge in 1-2 days if still tolerating Jean Nick, VITALIY      Lungs:  Mild R sided rhonchi. Trach in place. Heart:  RRR with no Murmur/Rubs/Gallops    Additional Comments: So far tolerating levaquin for pseudomonas. If continues to tolerate would discharge with at least 14-21 days of therapy and outpatient follow up. Remove levaquin allergy at discharge if tolerates. I have spoken with and examined the patient. I agree with the above assessment and plan as documented.     Trupti Akbar MD

## 2018-03-01 NOTE — PROGRESS NOTES
Pt alert and responds spontaneously. Diminished left radial pulse palpated. Elevated respiratory rate by 24. Blood pressure elevated by 131/78. Wheezes heard on anterior upper and lower lobes. Pt complains about abdominal pain 3 out of 10. Patient's family declines bad bath.  [facility] [place] BJU

## 2018-03-01 NOTE — PROGRESS NOTES
Morning bedside rounding report received from Brenden Zheng RN. Patient resting in bed with closed eyes, sleeping. Demonstrating no signs of dyspnea or distress with trach collar at 30%. Will continue to monitor.

## 2018-03-01 NOTE — PROGRESS NOTES
Pt lying in bed resting at this time. Respirations even and unlabored. No s/sx of pain or distress. Report to be given to oncoming nurse.

## 2018-03-01 NOTE — PROGRESS NOTES
Nazario Corey  Admission Date: 2/22/2018             Daily Progress Note: 3/1/2018    The patient's chart is reviewed and the patient is discussed with the staff. The patient's chart is reviewed and the patient is discussed with the staff.     70yo AAM with h/o laryngeal cancer, permanent trach, admitted for change in secretions and SOB. CXR revealing right sided PNA. Culture back with pseudomonas. He has chronic trach for laryngeal cancer. Reviewed with him that typically treat for 7-14 days if not longer with this organism. Sensitive to FQ unfortunately he is allergic to these (though the allergy listed is muscle cramps).     Subjective:       Complains of pain     Current Facility-Administered Medications   Medication Dose Route Frequency    levoFLOXacin (LEVAQUIN) tablet 750 mg  750 mg Oral Q24H    [START ON 3/2/2018] pantoprazole (PROTONIX) tablet 40 mg  40 mg Oral ACB    morphine 2 mg  2 mg IntraVENous Q4H PRN    polyethylene glycol (MIRALAX) packet 17 g  17 g Oral DAILY    docusate (COLACE) 50 mg/5 mL oral liquid 100 mg  100 mg Oral BID    methylPREDNISolone (PF) (SOLU-MEDROL) injection 40 mg  40 mg IntraVENous Q12H    gabapentin (NEURONTIN) capsule 100 mg  100 mg Per G Tube TID    aspirin chewable tablet 81 mg  81 mg Oral DAILY    clonazePAM (KlonoPIN) tablet 0.5 mg  0.5 mg Oral BID    HYDROcodone-acetaminophen (NORCO)  mg tablet 2 Tab  2 Tab Oral Q6H PRN    levothyroxine (SYNTHROID) tablet 137 mcg  137 mcg Oral ACB    metoprolol tartrate (LOPRESSOR) tablet 25 mg  25 mg Oral BID    mirtazapine (REMERON) tablet 15 mg  15 mg Oral QHS    sertraline (ZOLOFT) tablet 50 mg  50 mg Oral DAILY    sodium chloride (NS) flush 5-10 mL  5-10 mL IntraVENous Q8H    sodium chloride (NS) flush 5-10 mL  5-10 mL IntraVENous PRN    enoxaparin (LOVENOX) injection 40 mg  40 mg SubCUTAneous Q24H    budesonide (PULMICORT) 500 mcg/2 ml nebulizer suspension  500 mcg Nebulization BID RT Review of Systems    Constitutional: negative for fever, chills, sweats  Cardiovascular: negative for chest pain, palpitations, syncope, edema  Gastrointestinal:  negative for dysphagia, reflux, vomiting, diarrhea, abdominal pain, or melena  Neurologic:  negative for focal weakness, numbness, headache    Objective:     Vitals:    03/01/18 0300 03/01/18 0738 03/01/18 0918 03/01/18 1049   BP: 139/84 131/78  111/77   Pulse: 79 75  82   Resp: 18 16  24   Temp: 98.4 °F (36.9 °C) 97.4 °F (36.3 °C)  98.2 °F (36.8 °C)   SpO2: 96% 96% 98% 100%   Weight:       Height:         Intake and Output:   02/27 1901 - 03/01 0700  In: 757 [P.O.:80; I.V.:10]  Out: -        Physical Exam:   Constitution:  the patient is well developed and in no acute distress, TC 28 %  EENMT:  Sclera clear, pupils equal, oral mucosa moist  Respiratory: coarse rhonchi , trach in place   Cardiovascular:  RRR without M,G,R  Gastrointestinal: soft and non-tender; with positive bowel sounds. Musculoskeletal: warm without cyanosis. There is no lower leg edema.   Skin:  no jaundice or rashes, no wounds   Neurologic: no gross neuro deficits     Psychiatric:  Nods, unable to speak     CXR:       Assessment:  (Medical Decision Making)     Hospital Problems  Date Reviewed: 2/27/2018          Codes Class Noted POA    Constipation (Chronic)had multiple bowel movements ICD-10-CM: K59.00  ICD-9-CM: 564.00  2/27/2018 No            GERD (gastroesophageal reflux disease) (Chronic) ICD-10-CM: K21.9  ICD-9-CM: 530.81  2/23/2018 Yes    Overview Signed 3/21/2016 11:50 AM by Holli Ny     controlled with medication             Tracheal obstruction ICD-10-CM: J39.8  ICD-9-CM: 519.19  2/23/2018 Yes        * (Principal)Community acquired pneumonia of right upper lobe of lung ([facility] ICD-10-CM: J18.1  ICD-9-CM: 967  2/23/2018 Yes        Laryngeal cancer ([facility] ICD-10-CM: C32.9  ICD-9-CM: 161.9  2/23/2018 Yes        [facility] ([facility] ICD-10-CM: Z93.0  ICD-9-CM: V44.0  2/23/2018 Yes              Plan:  (Medical Decision Making)     -- Levaquin D 3/14-21 - will change to PO to see if he can tolerate   -PT    More than 50% of the time documented was spent in face-to-face contact with the patient and in the care of the patient on the floor/unit where the patient is located.     Griffin Philippe MD

## 2018-03-01 NOTE — PROGRESS NOTES
Bedside report received from RUSTY Ivan. Assessment completed. Pt is lying in bed with trach in place at this time. Pt A&O x 4. Respirations even and unlabored. No s/sx of acute pain or distress. Bed in low, locked position. Call light within reach. Pt instructed to call for assistance. Will monitor.

## 2018-03-02 NOTE — PROGRESS NOTES
Pt alert and oriented x2. Pt was able to write down to communicate. Pt complains about pain and writes, \"Everywhere pain\" Pt states 10/10 for pain. Diminished peripheral pulses. Diminished lung sounds heard on posterior right upper and lower lobes.  Ishan Ruiz, SN BJU

## 2018-03-02 NOTE — PROGRESS NOTES
[facility] completed. Pt is lying in bed with trach intact at this time. Pt A&O x 4. Respirations even and unlabored. No s/sx of acute pain or distress. Bed in low, locked position. Call light within reach. Pt instructed to call for assistance. Will monitor.

## 2018-03-02 NOTE — PROGRESS NOTES
Tien Sinclair  Admission Date: 2/22/2018             Daily Progress Note: 3/2/2018    The patient's chart is reviewed and the patient is discussed with the staff. The patient's chart is reviewed and the patient is discussed with the staff.     70yo AAM with h/o laryngeal cancer, permanent trach, admitted for change in secretions and SOB. CXR revealing right sided PNA. Culture back with pseudomonas. He has chronic trach for laryngeal cancer. Reviewed with him that typically treat for 7-14 days if not longer with this organism. Sensitive to FQ unfortunately he is allergic to these (though the allergy listed is muscle cramps).     Subjective:       Still complains of pain ,his sister says he is on fentanyl patch at home  Tolerated PO levaquin yesterday     Current Facility-Administered Medications   Medication Dose Route Frequency    fentaNYL (DURAGESIC) 50 mcg/hr patch 1 Patch  1 Patch TransDERmal Q72H    levoFLOXacin (LEVAQUIN) tablet 750 mg  750 mg Oral Q24H    pantoprazole (PROTONIX) tablet 40 mg  40 mg Oral ACB    morphine 2 mg  2 mg IntraVENous Q4H PRN    polyethylene glycol (MIRALAX) packet 17 g  17 g Oral DAILY    docusate (COLACE) 50 mg/5 mL oral liquid 100 mg  100 mg Oral BID    methylPREDNISolone (PF) (SOLU-MEDROL) injection 40 mg  40 mg IntraVENous Q12H    gabapentin (NEURONTIN) capsule 100 mg  100 mg Per G Tube TID    aspirin chewable tablet 81 mg  81 mg Oral DAILY    clonazePAM (KlonoPIN) tablet 0.5 mg  0.5 mg Oral BID    HYDROcodone-acetaminophen (NORCO)  mg tablet 2 Tab  2 Tab Oral Q6H PRN    levothyroxine (SYNTHROID) tablet 137 mcg  137 mcg Oral ACB    metoprolol tartrate (LOPRESSOR) tablet 25 mg  25 mg Oral BID    mirtazapine (REMERON) tablet 15 mg  15 mg Oral QHS    sertraline (ZOLOFT) tablet 50 mg  50 mg Oral DAILY    sodium chloride (NS) flush 5-10 mL  5-10 mL IntraVENous Q8H    sodium chloride (NS) flush 5-10 mL  5-10 mL IntraVENous PRN    enoxaparin (LOVENOX) injection 40 mg  40 mg SubCUTAneous Q24H    budesonide (PULMICORT) 500 mcg/2 ml nebulizer suspension  500 mcg Nebulization BID RT       Review of Systems    Constitutional: negative for fever, chills, sweats  Cardiovascular: negative for chest pain, palpitations, syncope, edema  Gastrointestinal:  negative for dysphagia, reflux, vomiting, diarrhea, abdominal pain, or melena  Neurologic:  negative for focal weakness, numbness, headache    Objective:     Vitals:    03/01/18 1900 03/01/18 2055 03/01/18 2300 03/02/18 0300   BP: 104/61  100/63 120/68   Pulse: 82  70 70   Resp: 22  22 20   Temp: 98.7 °F (37.1 °C)  97.4 °F (36.3 °C) 98 °F (36.7 °C)   SpO2: 100% 100% 100% 100%   Weight:       Height:         Intake and Output:   02/28 1901 - 03/02 0700  In: 3655 [P.O.:140; I.V.:30]  Out: -        Physical Exam:   Constitution:  the patient is well developed and in no acute distress, TC 28 %  EENMT:  Sclera clear, pupils equal, oral mucosa moist  Respiratory: coarse rhonchi , trach in place   Cardiovascular:  RRR without M,G,R  Gastrointestinal: soft and non-tender; with positive bowel sounds. Musculoskeletal: warm without cyanosis. There is no lower leg edema.   Skin:  no jaundice or rashes, no wounds   Neurologic: no gross neuro deficits     Psychiatric:  Nods, unable to speak     CXR:       Assessment:  (Medical Decision Making)     Hospital Problems  Date Reviewed: 2/27/2018          Codes Class Noted POA    Constipation (Chronic)had multiple bowel movements ICD-10-CM: K59.00  ICD-9-CM: 564.00  2/27/2018 No            GERD (gastroesophageal reflux disease) (Chronic) ICD-10-CM: K21.9  ICD-9-CM: 530.81  2/23/2018 Yes    Overview Signed 3/21/2016 11:50 AM by Emili Doan     controlled with medication             Tracheal obstruction ICD-10-CM: J39.8  ICD-9-CM: 519.19  2/23/2018 Yes        * (Principal)[facility] ICD-10-CM: J18.1  ICD-9-CM: 378 2/23/2018 Yes        [facility] ([facility] ICD-10-CM: C32.9  ICD-9-CM: 161.9  2/23/2018 Yes        Tracheostomy dependence ([facility] ICD-10-CM: Z93.0  ICD-9-CM: V44.0  2/23/2018 Yes              Plan:  (Medical Decision Making)     -- Levaquin D 4/14-21 days   -PT    More than 50% of the time documented was spent in face-to-face contact with the patient and in the care of the patient on the floor/unit where the patient is located.     Ayla Farrar MD

## 2018-03-02 NOTE — PROGRESS NOTES
Problem: Mobility Impaired (Adult and Pediatric)  Goal: *Therapy Goal (Edit Goal, Insert Text)  STG:  (1.)Mr. Lombardi will move from supine to sit and sit to supine , scoot up and down and roll side to side with CONTACT GUARD ASSIST within 4 treatment day(s). (2.)Mr. Lombardi will transfer from bed to chair and chair to bed with CONTACT GUARD ASSIST using the least restrictive device within 4 treatment day(s). (3.)Mr. Lombardi will ambulate with CONTACT GUARD ASSIST for 200 feet with the least restrictive device within 4 treatment day(s). LTG:  (1.)Mr. Lombardi will move from supine to sit and sit to supine , scoot up and down and roll side to side in bed with STAND BY ASSIST within 7 treatment day(s). (2.)Mr. Lombardi will transfer from bed to chair and chair to bed with STAND BY ASSIST using the least restrictive device within 7 treatment day(s). (3.)Mr. Lombardi will ambulate with STAND BY ASSIST for 500 feet with the least restrictive device within 7 treatment day(s). ________________________________________________________________________________________________       PHYSICAL THERAPY: Daily Note, Treatment Day: 3rd, PM 3/2/2018  INPATIENT: Hospital Day: 9  [place] / Product Type: Medicare /      NAME/AGE/GENDER: Harish Collazo is a 79 y.o. male   PRIMARY DIAGNOSIS: CAP (community acquired pneumonia) Community acquired pneumonia of right upper lobe of lung ([address] Community acquired pneumonia of right upper lobe of lung ([address]        ICD-10: Treatment Diagnosis:   · Difficulty in walking, Not elsewhere classified (R26.2)   Precaution/Allergies:  Ativan [lorazepam]; Levofloxacin; Other plant, animal, environmental; and Sulfa (sulfonamide antibiotics)      ASSESSMENT:     Mr. Fitz Oliva presents today supine in bed with his sister at the bedside providing support and care. He was agreeable to therapy.   He has a trach but can communicate with yes/no questions and gestures. Supine to sit was minimum. He tolerates sitting edge of bed x 7 minutes. Posture is mostly stability however the patient dis have a few episodes of posterior and lateral leaning. He gestures that he needs to be suctioned and is returned to supine in the bed. His sister performs the needed care. Patent participates in therapeutic strengthening exercises for BLE. Tolerated well. Good session. Patient is very cooperative and pleasant. A little progress demonstrated this treatment session. His sister is very supportive. Will continue PT efforts. This section established at most recent assessment   PROBLEM LIST (Impairments causing functional limitations):  1. Decreased Strength  2. Decreased ADL/Functional Activities  3. Decreased Transfer Abilities  4. Decreased Ambulation Ability/Technique  5. Decreased Balance  6. Decreased Activity Tolerance  7. Decreased Pacing Skills  8. Decreased Flexibility/Joint Mobility  9. Decreased Desha with Home Exercise Program   INTERVENTIONS PLANNED: (Benefits and precautions of physical therapy have been discussed with the patient.)  1. Balance Exercise  2. Bed Mobility  3. Family Education  4. Gait Training  5. Home Exercise Program (HEP)  6. Range of Motion (ROM)  7. Therapeutic Activites  8. Therapeutic Exercise/Strengthening  9. Transfer Training     TREATMENT PLAN: Frequency/Duration: 3-5 times a week for duration of hospital stay  Rehabilitation Potential For Stated Goals: Good     RECOMMENDED REHABILITATION/EQUIPMENT: (at time of discharge pending progress): Due to the probability of continued deficits (see above) this patient will likely need continued skilled physical therapy after discharge. Equipment:    Walkers, Type: Rolling Walker              HISTORY:   History of Present Injury/Illness (Reason for Referral): PER MD H&P   Patient is a 79 y.o.   male presents with cough , weakness, excessive trach secretions .     Patient was not able to give any hx , he has trach , he lives at home with hx of Laryngeal cancer , in the lat 24-48 hours , he had more secretions and SOB , he was given Augmentin from ENT but that didn't help      59-year-old male with a history of parotid Adenocarcinoma, tongue cancer, and prostate cancer with tracheostomy s/p chemo and radiation presents with sisters and son for excessive sleepiness today and agitation.  He had constipation last night and had a bowel movement after an enema.  He received his normal nighttime medications last night, and did not wake up as normal today.  Family states that they tried to saline suction his trach, but he became combative and frequently pulled out his inner cannula.  This resulted in a fair amount of bleeding from the tracheostomy.  They called the oncologist and was advised to come to the emergency department. [facility] was seen in the emergency Department February 11 twice in the same day for respiratory distress and mental status changes.  This was thought to be due to transient mucous plugging.  Also had elevation in the troponin that was thought to be rate related due to tachycardia.  He had CT of his head that showed sinusitis, CT of the soft tissue of the neck that showed expected findings from cancer and radiation, as well as a CT chest that was negative for pulmonary embolism.  He was followed up by oncologist Feb 14th and fentanyl patch was decreased from 50 to 25 mcg.  He was continued on Norco as needed for pain, Klonopin for anxiety, and Remeron for sleep.  Ritalin was discontinued as it was thought to have increased anxiety.  Patient currently complaining of chest pain, shortness of breath, throat pain, not feeling well. [facility] has had increasing yellow drainage from bilateral eyes today and has not received his normal eyedrops.  He has been unable to ambulate as usual for the past 2 weeks.  Had decreased urination today.  ENT called in Augmentin yesterday for \"possible infection\" around trach, but was not evaluated in office.     Past Medical History/Comorbidities:   Mr. Pasco Cooks  has a past medical history of Airway obstruction (4/20/2016); Allergic rhinitis; Anxiety (3/24/2016); BPH (benign prostatic hypertrophy); Cerumen impaction; Chronic otitis media; Chronic pharyngitis; Condyloma acuminatum; Dyslipidemia (3/24/2016); Dyspnea (4/8/2016); ETD (eustachian tube dysfunction); Former smoker; GERD (gastroesophageal reflux disease); History of condyloma acuminatum; History of parotid cancer ( ); History of sinus cancer (1989); Hypercholesterolemia (9/14/2016); Hypertension; Hypotension (4/21/2016); Hypothyroidism ( ); Ill-defined condition; Impotence of organic origin; Male stress incontinence (12/23/2014); Malignant neoplasm of head, face, and neck ([address] -- prior history.; Malignant neoplasm of parotid gland ([address] (2009); Malignant neoplasm of prostate ([address] Mixed hearing loss; Nasopharyngeal cancer ([address] Neoplasm of uncertain behavior of tongue; Otitis media; Parotid gland adenocarcinoma ([address] Perforation of tympanic membrane; Personal history of prostate cancer; Prostate cancer ([address] SOB (shortness of breath); Stridor (3/24/2016); Tracheal mass -- reproted sub-glotic (3/25/2016); Tracheal obstruction (3/24/2016); Tracheostomy in place [facility]) (6/20/2016); and Vocal cord paralysis (4/20/2016). Mr. Pasco Cooks  has a past surgical history that includes hx radical prostatectomy (5/2011); hx gi; hx heent (2009); hx heent (2010); pr sinus surgery proc unlisted (1989); and hx myringotomy (Bilateral, 2010, 2011, 2012).   Social History/Living Environment:   Home Environment: Trailer/mobile home  # Steps to Enter: 9  One/Two Story Residence: Two story, live on 1st floor  # of Interior Steps: [address]: Right  Lift Chair Available: No  Living Alone: No  Support Systems: [employer] / dayan community, Family member(s)  Patient Expects to be Discharged to[de-identified] [facility] home  Current DME Used/Available at Home: Nebulizer, Cane, straight, Cane, quad  Prior Level of Function/Work/Activity:  Patient with declining functional mobility concurrent with progression of chemotherapy and radiation. Dominant Side:         RIGHT  Personal Factors:          Sex:  male        Age:  79 y.o. Number of Personal Factors/Comorbidities that affect the Plan of Care: 1-2: MODERATE COMPLEXITY   EXAMINATION:   Most Recent Physical Functioning:   Gross Assessment:                  Posture:     Balance:  Sitting: Impaired  Sitting - Static: Fair (occasional)  Sitting - Dynamic: Fair (occasional)  Standing:  (n/a) Bed Mobility:  Rolling: Contact guard assistance  Supine to Sit: Minimum assistance  Sit to Supine: Minimum assistance  Scooting: Minimum assistance  Wheelchair Mobility:     Transfers:     Gait:            Body Structures Involved:  1. Bones  2. Joints  3. Muscles  4. Ligaments Body Functions Affected:  1. Neuromusculoskeletal  2. Movement Related  3. Skin Related  4. Metobolic/Endocrine Activities and Participation Affected:  1. General Tasks and Demands  2. Communication  3. Mobility  4. Self Care  5. Domestic Life  6. Community, Social and [place]   Number of elements that affect the Plan of Care: 3: MODERATE COMPLEXITY   CLINICAL PRESENTATION:   Presentation: Stable and uncomplicated: LOW COMPLEXITY   CLINICAL DECISION MAKIN [place] [id number] AM-PAC 6 Clicks   Basic Mobility Inpatient Short Form  How much difficulty does the patient currently have. .. Unable A Lot A Little None   1. Turning over in bed (including adjusting bedclothes, sheets and blankets)? [] 1   [] 2   [x] 3   [] 4   2. Sitting down on and standing up from a chair with arms ( e.g., wheelchair, bedside commode, etc.)   [] 1   [] 2   [x] 3   [] 4   3. Moving from lying on back to sitting on the side of the bed?    [] 1   [] 2   [x] 3   [] 4   How much help from another person does the patient currently need... Total A Lot A Little None   4. Moving to and from a bed to a chair (including a wheelchair)? [x] 1   [] 2   [] 3   [] 4   5. Need to walk in hospital room? [x] 1   [] 2   [] 3   [] 4   6. Climbing 3-5 steps with a railing? [x] 1   [] 2   [] 3   [] 4   © 2007, Trustees of [address] [place] [address] [address] [id number], under license to [employer]. All rights reserved      Score:  Initial: 12 Most Recent: X (Date: -- )    Interpretation of Tool:  Represents activities that are increasingly more difficult (i.e. Bed mobility, Transfers, Gait). Score 24 23 22-20 19-15 14-10 9-7 6     Modifier CH CI CJ CK CL CM CN      ? Mobility - Walking and Moving Around:     - CURRENT STATUS: CL - 60%-79% impaired, limited or restricted    - GOAL STATUS: CK - 40%-59% impaired, limited or restricted    - D/C STATUS:  ---------------To be determined---------------  [place] / [place] / Product Type: Medicare /      Medical Necessity:     · Patient demonstrates good rehab potential due to higher previous functional level. Reason for Services/Other Comments:  · Patient continues to require skilled intervention due to medical complications, patient unable to attend/participate in therapy as expected and debility with decreased mobility s/p chemotherapy and radiation treatments. .   Use of outcome tool(s) and clinical judgement create a POC that gives a: Clear prediction of patient's progress: LOW COMPLEXITY            TREATMENT:   (In addition to Assessment/Re-Assessment sessions the following treatments were rendered)   Pre-treatment Symptoms/Complaints: Patient is non verbal  Pain: Initial:   Pain Intensity 1: 0  Post Session:  0/10      Therapeutic Activity: (   10 min): Therapeutic activities including Bed transfers, static sitting balance and exercises  to improve mobility, strength, balance and coordination.   Required minimum assist with  tactile and verbal cues   to promote static and dynamic balance in standing and promote coordination of bilateral, lower extremity(s). Date:  2/26/18 Date:  2/27/18 Date:  3/2/18   Activity/Exercise Parameters Parameters Parameters   Quad sets X 10 B     AP X 10 B 2 x 10 30   LAQ X 8 B 2 x 10 20   Heel slides in supine  2 x 10 20   Hip abd/adduction in supine  2 x 10 20   AA SLR  10 B 20   Sit to stand   3 times with use of bed elevation    Therapeutic Exercise: ( 16 minutes):  Exercises per grid below to improve mobility, strength, balance and coordination. Required minimum assist with  verbal and tactile cues to promote proper body alignment and promote proper body breathing techniques. Progressed repetitions and complexity of movement as indicated. ·   Treatment/Session Assessment:    · Response to Treatment:  Tolerated well    · Interdisciplinary Collaboration:   o Physical Therapy Assistant  o Registered Nurse  · After treatment position/precautions:   o Supine in bed  o Bed/Chair-wheels locked  o Bed in low position  o Call light within reach  o RN notified  o Family at bedside   · Compliance with Program/Exercises: compliant  · Recommendations/Intent for next treatment session: \"Next visit will focus on advancements to more challenging activities, reduction in assistance provided and transfers, ambulation and mobility. \".   Total Treatment Duration:  PT Patient Time In/Time Out  Time In: 1404  Time Out: [address] [place], PTA

## 2018-03-03 NOTE — PROGRESS NOTES
Bolus feeding of Chocolate Ensure Enlive mixed with water given to patient via PEG tube. Flushed with 125 ml of water. Patient tolerated well. No complaints at this time.

## 2018-03-03 NOTE — PROGRESS NOTES
Report received from [facility] [address] [facility]. Patient stable, resting comfortably with daughter at bedside. # 6 Shiley for Trach size of patient. No suctioning at this time. Respirations even and unlabored. Will continue to monitor.

## 2018-03-03 NOTE — PROGRESS NOTES
Patient resting in bed quietly. Daughter at bedside. Gave morning meds crushed via PEG tube, patient tolerated well. No complaints of pain or discomfort at this time. Daughter specified if patient's meds can be switched to IV instead of PEG feeding so as patient doesn't have to be woken up so much for meds. Respirations even and unlabored. Bed low and locked, call light within reach. Report given to oncoming RN.

## 2018-03-03 NOTE — PROGRESS NOTES
May Bernard  Admission Date: 2/22/2018             Daily Progress Note: 3/3/2018    The patient's chart is reviewed and the patient is discussed with the staff. 68yo AAM with h/o laryngeal cancer, permanent trach, admitted for change in secretions and SOB. CXR revealing right sided PNA. Culture back with pseudomonas. He has chronic trach for laryngeal cancer. Reviewed with him that typically treat for 7-14 days if not longer with this organism. Sensitive to FQ unfortunately he is allergic to these (though the allergy listed is muscle cramps).     Subjective:     His daughter at the [facility]  Says he has been sleeping and not complaining of pain- yesterday started on Duragesic as he is at home     Current Facility-Administered Medications   Medication Dose Route Frequency    predniSONE (DELTASONE) tablet 40 mg  40 mg Oral DAILY WITH BREAKFAST    fentaNYL (DURAGESIC) 50 mcg/hr patch 1 Patch  1 Patch TransDERmal Q72H    levoFLOXacin (LEVAQUIN) tablet 750 mg  750 mg Oral Q24H    pantoprazole (PROTONIX) tablet 40 mg  40 mg Oral ACB    morphine 2 mg  2 mg IntraVENous Q4H PRN    polyethylene glycol (MIRALAX) packet 17 g  17 g Oral DAILY    docusate (COLACE) 50 mg/5 mL oral liquid 100 mg  100 mg Oral BID    gabapentin (NEURONTIN) capsule 100 mg  100 mg Per G Tube TID    aspirin chewable tablet 81 mg  81 mg Oral DAILY    clonazePAM (KlonoPIN) tablet 0.5 mg  0.5 mg Oral BID    HYDROcodone-acetaminophen (NORCO)  mg tablet 2 Tab  2 Tab Oral Q6H PRN    levothyroxine (SYNTHROID) tablet 137 mcg  137 mcg Oral ACB    metoprolol tartrate (LOPRESSOR) tablet 25 mg  25 mg Oral BID    mirtazapine (REMERON) tablet 15 mg  15 mg Oral QHS    sertraline (ZOLOFT) tablet 50 mg  50 mg Oral DAILY    sodium chloride (NS) flush 5-10 mL  5-10 mL IntraVENous Q8H    sodium chloride (NS) flush 5-10 mL  5-10 mL IntraVENous PRN    enoxaparin (LOVENOX) injection 40 mg  40 mg SubCUTAneous Q24H    budesonide (PULMICORT) 500 mcg/2 ml nebulizer suspension  500 mcg Nebulization BID RT       Review of Systems   Unobtainable due to patient status. Objective:     Vitals:    03/03/18 0400 03/03/18 0623 03/03/18 0710 03/03/18 0804   BP: 157/90  91/67    Pulse: 71  72    Resp: 16  16    Temp: 97.7 °F (36.5 °C)  97.4 °F (36.3 °C)    SpO2: 99% 100% 99% 96%   Weight:       Height:         Intake and Output:   03/01 1901 - 03/03 0700  In: 952 [P. O.:60; I.V.:20]  Out: -        Physical Exam:   Constitution:  the patient is well developed and in no acute distress  EENMT:  Sclera clear, pupils equal, oral mucosa moist  Respiratory: coarse ,has trach in place   Cardiovascular:  RRR without M,G,R  Gastrointestinal: soft and non-tender; with positive bowel sounds. , PEG   Musculoskeletal: warm without cyanosis. There is no lower leg edema. Skin:  no jaundice or rashes, no wounds   Neurologic: no gross neuro deficits     Psychiatric:  Sleeping but unable to speak         LAB  No results for input(s): GLUCPOC in the last 72 hours.     No lab exists for component: [place]   Recent Labs      03/03/18   0623   WBC  10.3   HGB  12.6*   HCT  39.3*   PLT  343     Recent Labs      03/03/18   0623   NA  139   K  4.5   CL  101   CO2  31   GLU  137*   BUN  26*   CREA  0.57*   CA  8.3         Assessment:  (Medical Decision Making)     Hospital Problems  Date Reviewed: 2/27/2018          Codes Class Noted POA    Constipation (Chronic) ICD-10-CM: K59.00  ICD-9-CM: 564.00  2/27/2018 No        Hypothyroidism (Chronic) ICD-10-CM: E03.9  ICD-9-CM: 244.9  2/23/2018 Yes        GERD (gastroesophageal reflux disease) (Chronic) ICD-10-CM: K21.9  ICD-9-CM: 530.81  2/23/2018 Yes    Overview Signed 3/21/2016 11:50 AM by Chelle Ch     controlled with medication             Tracheal obstruction ICD-10-CM: J39.8  ICD-9-CM: 519.19  2/23/2018 Yes        * (Principal)Community acquired pneumonia of right upper lobe of lung ([facility] ICD-10-CM: J18.1  ICD-9-CM: 047  2/23/2018 Yes        Laryngeal cancer ([facility] ICD-10-CM: C32.9  ICD-9-CM: 161.9  2/23/2018 Yes        Tracheostomy dependence ([facility] ICD-10-CM: Z93.0  ICD-9-CM: V44.0  2/23/2018 Yes              Plan:  (Medical Decision Making)     --Levaquin D 5/14-21 days  -seems to be tolerating PO levaquin OK  -monitor BP -little lower today, may need adjustment   - recheck CXR in AM  -hopefully home tomorrow or Monday    More than 50% of the time documented was spent in face-to-face contact with the patient and in the care of the patient on the floor/unit where the patient is located.     Khloe Rodriguez MD

## 2018-03-04 NOTE — PROGRESS NOTES
Resting quietly at present. Awaiting ENT. Sister remains at bedside.  To report off to oncoming nurse

## 2018-03-04 NOTE — PROGRESS NOTES
Bedside report received from night nurse Clarisse. Assessment done as noted  Respiration even and unlabored 20/min; denies pain or nausea at present. Tach intact and patent. Sister remains at bedside. Encouraged to call with needs.

## 2018-03-04 NOTE — PROGRESS NOTES
Verbal bedside report given to oncoming nurse Dodie Leonardo. Patient's situation, background, assessment and recommendations provided. Opportunity for questions provided. No s/s of pain noted. No distress noted. Oncoming RN assumed care of patient.

## 2018-03-04 NOTE — PROGRESS NOTES
Lily Jones  Admission Date: 2/22/2018             Daily Progress Note: 3/4/2018    The patient's chart is reviewed and the patient is discussed with the staff. 70yo AAM with h/o laryngeal cancer, permanent trach, admitted for change in secretions and SOB. CXR revealing right sided PNA. Culture back with pseudomonas. He has chronic trach for laryngeal cancer. Reviewed with him that typically treat for 7-14 days if not longer with this organism. Sensitive to FQ unfortunately he is allergic to these (though the allergy listed is muscle cramps). Subjective:     His sister at the bedsite, says he sleep now all the time- ?  Since place fentanyl patch     Current Facility-Administered Medications   Medication Dose Route Frequency    [START ON 3/5/2018] predniSONE (DELTASONE) tablet 30 mg  30 mg Oral DAILY WITH BREAKFAST    fentaNYL (DURAGESIC) 25 mcg/hr patch 1 Patch  1 Patch TransDERmal Q72H    camphor-menthol (SARNA) 0.5-0.5 % lotion   Topical BID PRN    white petrolatum-mineral oil (EUCERIN) cream   Topical PRN    levoFLOXacin (LEVAQUIN) tablet 750 mg  750 mg Oral Q24H    pantoprazole (PROTONIX) tablet 40 mg  40 mg Oral ACB    morphine 2 mg  2 mg IntraVENous Q4H PRN    polyethylene glycol (MIRALAX) packet 17 g  17 g Oral DAILY    docusate (COLACE) 50 mg/5 mL oral liquid 100 mg  100 mg Oral BID    gabapentin (NEURONTIN) capsule 100 mg  100 mg Per G Tube TID    aspirin chewable tablet 81 mg  81 mg Oral DAILY    clonazePAM (KlonoPIN) tablet 0.5 mg  0.5 mg Oral BID    HYDROcodone-acetaminophen (NORCO)  mg tablet 2 Tab  2 Tab Oral Q6H PRN    levothyroxine (SYNTHROID) tablet 137 mcg  137 mcg Oral ACB    metoprolol tartrate (LOPRESSOR) tablet 25 mg  25 mg Oral BID    mirtazapine (REMERON) tablet 15 mg  15 mg Oral QHS    sertraline (ZOLOFT) tablet 50 mg  50 mg Oral DAILY    sodium chloride (NS) flush 5-10 mL  5-10 mL IntraVENous Q8H    sodium chloride (NS) flush 5-10 mL 5-10 mL IntraVENous PRN    enoxaparin (LOVENOX) injection 40 mg  40 mg SubCUTAneous Q24H    budesonide (PULMICORT) 500 mcg/2 ml nebulizer suspension  500 mcg Nebulization BID RT       Review of Systems   Unobtainable due to patient status. Objective:     Vitals:    03/03/18 2057 03/03/18 2334 03/04/18 0322 03/04/18 0711   BP:  115/70 102/71 (!) 169/91   Pulse:  71 69 78   Resp:  16 15 16   Temp:  98.6 °F (37 °C) 98.3 °F (36.8 °C) 98.3 °F (36.8 °C)   SpO2: 98% 98% 100% 100%   Weight:       Height:         Intake and Output:   03/02 1901 - 03/04 0700  In: 2060   Out: -        Physical Exam:   Constitution:  the patient is well developed and in no acute distress  EENMT:  Sclera clear, pupils equal, oral mucosa moist  Respiratory: coarse ,has trach in place   Cardiovascular:  RRR without M,G,R  Gastrointestinal: soft and non-tender; with positive bowel sounds. , PEG   Musculoskeletal: warm without cyanosis. There is no lower leg edema. Skin:  no jaundice or rashes, no wounds   Neurologic: no gross neuro deficits     Psychiatric:  Sleeping but unable to speak         LAB  No results for input(s): GLUCPOC in the last 72 hours.     No lab exists for component: [place]   Recent Labs      03/03/18   0623   WBC  10.3   HGB  12.6*   HCT  39.3*   PLT  343     Recent Labs      03/03/18   0623   NA  139   K  4.5   CL  101   CO2  31   GLU  137*   BUN  26*   CREA  0.57*   CA  8.3         Assessment:  (Medical Decision Making)     Hospital Problems  Date Reviewed: 2/27/2018          Codes Class Noted POA    Constipation (Chronic) ICD-10-CM: K59.00  ICD-9-CM: 564.00  2/27/2018 No        Hypothyroidism (Chronic) ICD-10-CM: E03.9  ICD-9-CM: 244.9  2/23/2018 Yes        GERD (gastroesophageal reflux disease) (Chronic) ICD-10-CM: K21.9  ICD-9-CM: 530.81  2/23/2018 Yes    Overview Signed 3/21/2016 11:50 AM by Brian Cea     controlled with medication             Tracheal obstruction ICD-10-CM: J39.8  ICD-9-CM: 519.19  2/23/2018 Yes * (Principal)Community acquired pneumonia of right upper lobe of lung ([facility] ICD-10-CM: J18.1  ICD-9-CM: 385  2/23/2018 Yes        Laryngeal cancer ([facility] ICD-10-CM: C32.9  ICD-9-CM: 161.9  2/23/2018 Yes        Tracheostomy dependence ([facility] ICD-10-CM: Z93.0  ICD-9-CM: V44.0  2/23/2018 Yes              Plan:  (Medical Decision Making)     --Levaquin D 6/14-21 days -seems to be tolerating PO ok  -will change fentanyl patch to 25 instead of 50   -seems to be tolerating PO levaquin OK  --should be able to go home in AM    More than 50% of the time documented was spent in face-to-face contact with the patient and in the care of the patient on the floor/unit where the patient is located.     Claudia Ramirez MD

## 2018-03-04 NOTE — PROGRESS NOTES
Called to room per family member saying \"he's bleeding from his trach and his mouth\". Oral suctioned with large amount of bright red blood. Trach suctioned with moderate amount of bloody secretion. RT at bedside. Spoke with Tee De Jesus NP and made aware. No new orders at present. Will continue to monitor closely.

## 2018-03-04 NOTE — PROGRESS NOTES
Large loose BM. Maude care given. When turned to side, starts coughing up bloody secretion. Trach suctioned with large amount of secretion. Inner canula changed. RT aware. Family remains at bedside.

## 2018-03-04 NOTE — PROGRESS NOTES
Report given to RUSTY Henderson. Patient alert and oriented. Respirations even and unlabored. Suctioned patient with 14 Fr. Tolerated well. Small amount of yellowish drainage noted. No pain or discomfort. Sister at bedside. More suction kits placed at bedside. Bed low and locked. Call light within reach. Will continue to monitor.

## 2018-03-04 NOTE — PROGRESS NOTES
Gave patient evening bolus feeding of Chocolate Ensure Enlive with 100 ml of H20. Flushed with 125 ml of water. Patient tolerated well. No complaints at this time. Will continue to monitor.

## 2018-03-05 PROBLEM — R04.2 HEMOPTYSIS: Status: ACTIVE | Noted: 2018-01-01

## 2018-03-05 NOTE — PROGRESS NOTES
Bedside report received from night nurse Clarisse. Assessment done as noted  Respiration even and unlabored 20/min; denies pain or nausea at present. Trach intact and patent. Sister remains at bedside. Awaiting ENT to see the patient. Encouraged to call with needs.

## 2018-03-05 NOTE — PROGRESS NOTES
Gave patient Bolus feeding of Vanilla Ensure Enlive (240 ml)  mixed with 100 ml of water. Talked with sister at bedside and we felt it was appropriate to give patient half of his normal feeding due to the fact that patient had recently received last feeding from day RN. Patient tolerated well, with no complaints at this time. Will continue to monitor.

## 2018-03-05 NOTE — CONSULTS
Asked to see pt, known to Dr. Citlali Snell from advanced head and neck CA (parotid). Pt admitted with community acquired pneumonia. Has permanent tracheostomy and had had bloody discharge. HEENT exam significant for being hard of hearing (had a PE tube placed by Dr. Citlali Snell recently). Pt is cachectic with fibrous, woody edema of the neck bilaterally. Tracheotomy is in good position and site is generally clean. I do not see any evidence of tumor involvement at the trach site. RT tongue base is involved with tumor, and though there is not active bleeding, this is a likely source. Rec: routine cleaning of tracheotomy site. Suctioning of tracheotomy tube (without going so deep it causes pain) should be helpful. Pt's sister (?) reports saline is typically used,and I talke with her about using [place] Saline Gel in and around the tracheostomy. Pt has an apptointment with Dr. Citlali Snell in two days I believe, and keeping that appointment is improtant, as expressed to patient and family.

## 2018-03-05 NOTE — PROGRESS NOTES
Report received from [facility]. Patient resting in bed. Was able to give high five when asked orientation questions. Sister at bedside. Respirations even and unlabored. Noted no blood around trach nor mouth at this time. Patient did not want to be touched. Noted small amounts of crackling on right upper lung. Awaiting ENT MD to come and assess patient. Bed low and locked, call light within reach. Will continue to monitor.

## 2018-03-06 NOTE — PROGRESS NOTES
Bedside report received from [place], [address] [place] [address]. Assessment completed. Pt is lying in bed at this time with trach in place at this time. Pt A&O x 4. Respirations even and unlabored. No s/sx of acute pain or distress. Bed in low, locked position. Call light within reach. Pt instructed to call for assistance. Will monitor.

## 2018-03-06 NOTE — CONSULTS
[facility] Hematology & Oncology        Inpatient Hematology / Oncology Consult Note    Reason for Consult:  CAP (community acquired pneumonia)  Referring Physician:  Dianne Adamson MD    History of Present Illness:  Mr. Harika Blanton is a 79 y.o. male admitted on 2/22/2018 with a primary diagnosis of The primary encounter diagnosis was Community acquired pneumonia of right upper lobe of lung ([address] Diagnoses of Hypoxia, Severe sepsis ([address] Laryngeal cancer (HCC), Tracheal obstruction, Tracheostomy dependence ([address] Constipation, unspecified constipation type, Airway obstruction, Chronic pharyngitis, Gastroesophageal reflux disease without esophagitis, Male stress incontinence, Malignant neoplasm of head, face, and neck ([address] and Hemoptysis were also pertinent to this visit. [place] He is a known patient of Dr. Ran Solorzano with Stage IV parotid adenocarcinoma, currently undergoing treatment with cetuximab, last treatment given 2/14/18. His next treatment was due 2/28, but he was hospitalized at that time. He presented to ED after family tried to suction trach - he became combative and pulled out his inner cannula which resulted in bleeding. CXR revealed RUL pneumonia with Cx +pseudomonas. He is currently being treated with Levaquin. DC was planned for yesterday, but he developed bleeding from his trach, initially felt to possibly be related to tumor involvement. ENT was consulted and no tumor involvement evident. ENT recommended routine trach cleaning with Ayr Saline Gel around trach. Also discussed suctioning of tracheotomy tube (without going so deep it causes pain). We were consulted for recommendations for our patient. Onc History (copied from previous):  57 yo with HNSCC s/p XRT in 1989, left parotid adenocarcinoma s/p resection/XRT/neck dissection in 2009, Prostate cancer of intermediate risk s/p resection in 2011, present to establish care for follow up.  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 No evidence of cancer recurrence.      Oncology rehabilitation was offered but the patient has already been through PT at oncology rehabilitation and is doing home exercises since that time. Did not seem to make very much difference. Patient denies having any bone involvement with prostate cancer. Mr Erwin Alvarado was encouraged to continue stretching and exercising his neck to get optimal use of his neck area. We have referred him to onc rehab program, and he felt the stretching and Yoga were helpful but not enough. He saw Dr. Pepper Ge who ordered MRI, which reported an enhancement of the left pyriform sinus, but direct inspection did not reveal abnormality per pt reports. We recommend him to continue with the yoga and stretching program since it was helpful, and refer him to Dr. Tomas Salvador for further physiatry management. He followed with Dr. Pepper Ge and to [address], no recurrence was found. A PET was done in 4/2014 showed no evidence of disease, I reviewed PET personally.     He has made progress with Dr. Slivia Montoya for the neck stiffness, using hyperbaric oxygen and PT. PSA <0.1. TSH 1.68. He has been struggling with chronic left ear infection and drain and followed with ENT, blood tinged post-nasal drip and seen in [address] in 12/2014 and inspection showed no recurrence but infection/inflammation.     He had an ear infection in 12/2014 went to ER and referred to Dr. Tg Vazquez, who found a lesion of left tongue base, followed with Dr. Pepper Ge then [address] without clear finding, saw by Dr. Tg Vazquez again in Feb and again saw the lesion, which was later biopsied by Dr. Pepper Ge in 3/2015 showed adenocarcinoma. He returned to [address], a PET was done and reportedly found the tongue lesions as the solitary avid spot, resected by Dr. Aida Farr on 5/21/15 and pathology showed metastatic prostate cancer. Pt felt devastated and was referred to Dr. Zonia Goldberg.  I explained that was a very unusual pattern but technically as stage IV prostate cancer, but his PSA remained undetectable at this point and he had no evidence of detectable disease, so further surveillance is advisable for now. We need to obtian all his imaging report and disc and pathology to ascertain his history, and decided whether bone scan is needed based on his PSA and PET. His PET disc still not yet available, but he had seen [address] oncologist Dr. Marlena Aguirre, who reviewed his new pathology carefully with pathologist compared to prior parotid adenocarcinoma and prior prostate cancer and concluded the new tongue lesion was consistent with metastatic parotid adenocarcinoma. We discussed this would be much more clinically convincing, nonetheless the treatment of the locoregional recurrence had not been standardized, XRT would be preferred but with great difficulty given prior XRT exposure, chemotherapy would be only palliative and I do not see a role at the point while he had no active disease. Dr. Julissa Levin arranged him to see an head/neck oncologist at [address], no systemic therapy was recommended and was admitted for a brachytherapy  on 8/21/15 as well as excision of a tiny right lower cervical node positive for cancer, had trach for prophylaxis. Continue to follow [address] and defer to [address] to repeat surveillance scans, Negative scope February 3rd. However he was admitted on 3/24/16 for dyspnea and had bronchoscopy found narrow airway with a growth below vocal cord, pt desaturated and stayed in ICU, also consulted ENT, stablized and currently following pulm/ENT outpt, pending visit at [address]. He started 3 anti-hyertensives while in hospital, however he had been dizzy and SBP in office was 84, considering his baseline BP was normal prior, his HTN in hospital was probably related to stress, instruct to stop anti-hypertensives and monitor BP at home and BP appeared stable.  He was seen in [address], found severe airway stenosis and received Trach, dysphagia and received PEG, reported not finding malignancy of airway, however showed small tongue lesion of cancer and discussing Rx plan, given the location and h/o multiple XRT, I felt surgery would be the preferred approach. He returned reporting a repeat PET confirmed the tongue lesion but no Rx was planned yet, discussed chemotherapy but that would be palliative only and toxic given he just recovered his nutrition and performance status, and local therapy of surgery or ablation would be much more attractive approach. He followed ENT and found a second tongue lesion and rec pursue chemotherapy, saw Dr. Pari Burgos and enrolled in clinical trial, but pt was considering changing care to here, instructed to further discuss with Dr. Do Riggs for options and recs, RTC in 1 month to follow outcome and plan, need all recent notes and imaging to be obtained. He returned 12/22/16, reported not eligible for trials at [address], received one cycle of chemo that he could not specify and tolerated poorly, we still had not received records from [address] but he was referred to [facility], we discussed systemic therapy appeared the only option now given he had exhausted local therapy options at [address], discussed standard combined cisplatin based chemo vs single agent for better tolerance, and erbitux or check point inhibitor would be good options given his reported poor chemo tolerance, which technically was approved for cisplatin resistant disease. We also discussed trial options eg MATCH. He should be cared well either location and he will decide his further plan and call to arrange follow up if desired.     He reportedly saw Dr. Crow Velasquez and received Ramirez Estefania for 6 months and stopped for POD. He was referred to palliative care and had not received further rx since 6/2017, but came to [facility] in 10/2017 requiring more rx.  Arranged restaging scans 11/2/17 showed disease progression, I reviewed CT personally and discussed with pt and sister, desired more rx and arranged to start erbitux, discussed toxicities and management, arrange education, discussed new sclerosing bone lesinos and will monitor on following scans, PSA undetectable, check CEA, start rx 11/15/17, discussed weight loss and weakness, added decadron, meet nutritionist, reportedly feeling better after cycle 1, weight stable and continue effort, stop the acyclovir given no active infection and no indication for prophylaxis, due for week 2 but severe hypotensive, constipation, weight loss, instructed to stop HCTZ and erbisartan, give IVF, refill norco for pain, and instructed for constipation rx, started doing better and stopped losing weight and discussed to increase Ensure to 6 cans a day, CEA decreased, doxycycline for rash, cortisone cream, hand lotion for cracking, returned with swollen lips and reported previously responded to steroid form ENT, give steroid and anti-histamine, proceed to next cycle Erbitux, trend CEA down and pain better controlled but losing weight for not motivated for feeding, we discussed and agree to improve, refilled pain med and instruct wean as tolerated, add ativan for insomnia instead of using hydrocodon for sleeping, wean decadron as lip swelling tolerated, repeat CT showed stable disease, I reviewed CT personally and discussed with pt and family and desired to continue Erbitux, c/o constipation and instructed for laxatives regularly given his high dose of pain meds and enema as needed, discussed need to wean pain meds, use clonazepam for anxiety, proper use of remeron to help insomnia, desired to change to q2week and adjusted Erbitux to 500mg/m2 q2week.     He returns today for follow up.  He was in the ED over the weekend with mucus plug, respiratory distress. [facility] states he feels as if this throat is \"tight\", like he needs to be suctioned but there is nothing there. [facility] still has significant anxiety.  He is tolerating fentanyl 50mcg/hr TD q 72 hours.  Sister states that he had a day in which he received no breakthrough norco and did not ask for it. [facility] seemed to be more lucid and alter throughout the day.  His hearing is getting progressively worse, and at this point is nearly deaf. Clementine Riderick his inability to speak as well, communication seems to be more problematic as time goes by. Ezio Goes of visit made with writing on legal pads. He will have f/u with ENT early next week. He also complains of increasing weakness from the waist down - likely steroid induced. Will consult home PT/OT to maximize his potential. Labs reviewed. I see no contraindication to proceeding with therapy today. Follow up in two weeks for next cycle. Review of Systems:  Unable to assess - pt somnolent. Allergies   Allergen Reactions    Ativan [Lorazepam] Other (comments)     Tremors    Levofloxacin Other (comments)     Muscle cramps/pain    Other Plant, Animal, Environmental Unknown (comments)     Sulfate      Sulfa (Sulfonamide Antibiotics) Shortness of Breath and Other (comments)     Past Medical History:   Diagnosis Date    Airway obstruction 4/20/2016    Allergic rhinitis     Anxiety 3/24/2016    BPH (benign prostatic hypertrophy)     Cerumen impaction     Chronic otitis media     Chronic pharyngitis     Condyloma acuminatum     Dyslipidemia 3/24/2016    Dyspnea 4/8/2016    ETD (eustachian tube dysfunction)     Former smoker     GERD (gastroesophageal reflux disease)     at times    History of condyloma acuminatum     on tongue    History of parotid cancer      History of sinus cancer 1989    Hypercholesterolemia 9/14/2016    Hypertension     Hypotension 4/21/2016    Hypothyroidism      secondary to radiation to neck    Ill-defined condition     left face twitching    Impotence of organic origin     Male stress incontinence 12/23/2014    Malignant neoplasm of head, face, and neck ([address] -- prior history.     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[facility]     w/XRT    Neoplasm of uncertain behavior of tongue     Otitis media     Parotid gland adenocarcinoma (HCC)     Perforation of tympanic membrane     Personal history of prostate cancer     Prostate cancer ([facility]     diagnosed 2/24/11    SOB (shortness of breath)     Stridor 3/24/2016    Tracheal mass -- reproted sub-glotic 3/25/2016    Tracheal obstruction 3/24/2016    Tracheostomy in place [facility]) 6/20/2016    Vocal cord paralysis 4/20/2016     Past Surgical History:   Procedure Laterality Date    HX GI      esophageal dilation    HX HEENT  2009    Removal lymph nodes/saliva glands and radiation/chemo    HX HEENT  2010    left total parotidectomy & bilateral radical neck dissection    HX MYRINGOTOMY Bilateral 2010, 2011, 2012 2010-Dr. Carroll Pavon  5/2011     Dr. Meg Valentine in [facility]    lymph nodes removed     Family History   Problem Relation Age of Onset    Hypertension Mother     Cancer Mother      breast    Hypertension Father     Heart Disease Father      Social History     Social History    Marital status: SINGLE     Spouse name: N/A    Number of children: N/A    Years of education: N/A     Occupational History    [employer]      11 years     Social History Main Topics    Smoking status: Former Smoker     Packs/day: 0.25     Years: 8.00     Types: Cigarettes     Quit date: 4/20/1976    Smokeless tobacco: Never Used      Comment: Quit in the 1970s    Alcohol use No    Drug use: No    Sexual activity: Not on file     Other Topics Concern    Not on file     Social History Narrative    Lives at home and his sister and son live with him.      Current Facility-Administered Medications   Medication Dose Route Frequency Provider Last Rate Last Dose    predniSONE (DELTASONE) tablet 30 mg  30 mg Oral DAILY WITH BREAKFAST Ashly Holland MD   30 mg at 03/06/18 0840    fentaNYL (DURAGESIC) 25 mcg/hr patch 1 Patch  1 Patch TransDERmal Q72H Ashly Holland MD   1 Patch at 03/04/18 1414    camphor-menthol (SARNA) 0.5-0.5 % lotion   Topical BID PRN Ashly Holland MD        white petrolatum-mineral oil (EUCERIN) cream   Topical PRN Ashly Holland MD        levoFLOXacin [facility]) tablet 750 mg  750 mg Oral Q24H Ashly Holland MD   750 mg at 03/05/18 1315    pantoprazole (PROTONIX) tablet 40 mg  40 mg Oral ACB Ashly Holland MD   40 mg at 03/06/18 0542    morphine 2 mg  2 mg IntraVENous Q4H PRN Gilson Johnson MD        polyethylene glycol [facility]) packet 17 g  17 g Oral DAILY Jaqui Mark NP   Stopped at 03/04/18 0900    docusate (COLACE) 50 mg/5 mL oral liquid 100 mg  100 mg Oral BID Jaqui Mark NP   Stopped at 03/04/18 0900    gabapentin (NEURONTIN) capsule 100 mg  100 mg Per G Tube TID Slo Clark MD   100 mg at 03/06/18 0840    aspirin chewable tablet 81 mg  81 mg Oral DAILY Bimal Sue MD   81 mg at 03/06/18 0840    clonazePAM (KlonoPIN) tablet 0.5 mg  0.5 mg Oral BID Bimal Sue MD   0.5 mg at 03/06/18 0840    HYDROcodone-acetaminophen (NORCO)  mg tablet 2 Tab  2 Tab Oral Q6H PRN Bimal Sue MD   2 Tab at 03/06/18 1219    levothyroxine (SYNTHROID) tablet 137 mcg  137 mcg Oral ACB Bimal Sue MD   137 mcg at 03/06/18 0541    metoprolol tartrate (LOPRESSOR) tablet 25 mg  25 mg Oral BID Bimal Sue MD   25 mg at 03/06/18 0840    mirtazapine (REMERON) tablet 15 mg  15 mg Oral QHS Bimal Sue MD   15 mg at 03/05/18 2201    sertraline (ZOLOFT) tablet 50 mg  50 mg Oral DAILY Bimal Sue MD   50 mg at 03/06/18 0840    sodium chloride (NS) flush 5-10 mL  5-10 mL IntraVENous Q8H Bimal Sue MD   10 mL at 03/06/18 0504    sodium chloride (NS) flush 5-10 mL  5-10 mL IntraVENous PRN Bimal Sue MD   10 mL at 03/03/18 1546    enoxaparin (LOVENOX) injection 40 mg  40 mg SubCUTAneous Q24H Savanna Yost MD   40 mg at 18 1029    budesonide (PULMICORT) 500 mcg/2 ml nebulizer suspension  500 mcg Nebulization BID RT Savanna Yost MD   500 mcg at 18 2620       OBJECTIVE:  Patient Vitals for the past 8 hrs:   BP Temp Pulse Resp SpO2   18 1148 108/65 98.3 °F (36.8 °C) 84 20 98 %   18 0927 - - - - 94 %   18 0731 106/61 97.5 °F (36.4 °C) 76 20 98 %     Temp (24hrs), Av.3 °F (36.8 °C), Min:97.4 °F (36.3 °C), Max:99.1 °F (37.3 °C)     0701 -  1900  In: 360   Out: 0     Physical Exam:  Constitutional: Well developed, well nourished male in no acute distress, lying comfortably in the hospital bed. Wife at bedside. HEENT: Normocephalic and atraumatic. Oropharynx is clear, mucous membranes are moist.  Neck supple without JVD. No thyromegaly present. +Trach collar intact       Skin Warm and dry. No bruising and no rash noted. No erythema. No pallor. Respiratory Lungs are clear to auscultation bilaterally without wheezes, rales or rhonchi, normal air exchange without accessory muscle use. +Trach   CVS Normal rate, regular rhythm and normal S1 and S2. No murmurs, gallops, or rubs. Abdomen Soft, nontender and nondistended, normoactive bowel sounds. No palpable mass. No hepatosplenomegaly. Neuro Somnolent   MSK Normal range of motion in general.  No edema and no tenderness. Psych Somnolent       Labs:    No results found for this or any previous visit (from the past 24 hour(s)). Imaging:  XR CHEST Galicia Sarai [[id number]] Collected: 18      Order Status: Completed Updated: 18     Narrative:       CHEST X-RAY, one view. HISTORY:  Shortness of breath. TECHNIQUE:  AP upright portable view. COMPARISON: 2018.       Impression:       IMPRESSION:   Minimal band of atelectasis left base, otherwise lungs are clear.   Tracheostomy tube remains.              XR CHEST PORT [[id number]] Collected: 02/26/18 0515     Order Status: Completed Updated: 02/26/18 0540     Narrative:       EXAM:  TEMPORARY    INDICATION:  Respiratory failure    COMPARISON:  2/22/2018    FINDINGS: A portable AP radiograph of the chest was obtained at 0528 hours. The  patient is on a cardiac monitor.  Right upper lobe airspace disease worse in the  interval. Tracheostomy tubes in place.  The cardiac and mediastinal contours and  pulmonary vascularity are normal.  The bones and soft tissues are grossly within  normal limits.        Impression:       IMPRESSION: Right upper lobe atelectasis or pneumonia worse in the interval.         CT HEAD WITHOUT CONTRAST [[id number]]      Order Status: Canceled      XR CHEST PORT [[id number]] Collected: 02/22/18 2232     Order Status: Completed Updated: 02/22/18 2236     Narrative:       Portable chest x-ray    Clinical indications: Shortness of breath, tracheostomy tube    FINDINGS: Single AP view the chest compared to a similar study dated 2/11/2018  shows new patchy airspace opacification in the right upper lung suspicious for  pneumonia. There is no pleural effusion or pneumothorax. The cardiac silhouette  and mediastinum are stable with tracheostomy tube in place.       Impression:       IMPRESSION: Patchy airspace opacity in the right upper lung suspicious for  pneumonia.          ASSESSMENT:  Problem List  Date Reviewed: 2/27/2018          Codes Class Noted    Hemoptysis ICD-10-CM: R04.2  ICD-9-CM: 786.30  3/5/2018        Constipation (Chronic) ICD-10-CM: K59.00  ICD-9-CM: 564.00  2/27/2018        Hypothyroidism (Chronic) ICD-10-CM: E03.9  ICD-9-CM: 244.9  2/23/2018        GERD (gastroesophageal reflux disease) (Chronic) ICD-10-CM: K21.9  ICD-9-CM: 530.81  2/23/2018    Overview Signed 3/21/2016 11:50 AM by Ashley Mohamud     controlled with medication             Tracheal obstruction ICD-10-CM: J39.8  ICD-9-CM: 519.19  2/23/2018        * (Principal)Community acquired pneumonia of right upper lobe of lung ([facility] ICD-10-CM: J18.1  ICD-9-CM: 523  2/23/2018        Laryngeal cancer ([facility] ICD-10-CM: C32.9  ICD-9-CM: 161.9  2/23/2018        Tracheostomy dependence ([facility] ICD-10-CM: Z93.0  ICD-9-CM: V44.0  2/23/2018        Hypoxia ICD-10-CM: R09.02  ICD-9-CM: 799.02  2/12/2018        Elevated troponin ICD-10-CM: R74.8  ICD-9-CM: 790.6  2/12/2018        Syncope ICD-10-CM: R55  ICD-9-CM: 780.2  2/12/2018        Pleural effusion ICD-10-CM: J90  ICD-9-CM: 511.9  9/1/2017        Mediastinal lymphadenopathy ICD-10-CM: R59.0  ICD-9-CM: 785.6  9/1/2017        Malignant tumor of parotid gland ([facility] ICD-10-CM: C07  ICD-9-CM: 142.0  11/16/2016        Essential hypertension with goal blood pressure less than 130/85 ICD-10-CM: I10  ICD-9-CM: 401.9  11/16/2016        Hypercholesterolemia ICD-10-CM: E78.00  ICD-9-CM: 272.0  9/14/2016        Ill-defined condition ICD-10-CM: R69  ICD-9-CM: 799.89  Unknown    Overview Signed 9/14/2016  1:11 PM by Sulaiman Grant     left face twitching             Chronic otitis media ICD-10-CM: H66.90  ICD-9-CM: 382.9  Unknown        Perforation of tympanic membrane ICD-10-CM: H72.90  ICD-9-CM: 384.20  Unknown        Chronic pharyngitis ICD-10-CM: J31.2  ICD-9-CM: 472.1  Unknown        ETD (eustachian tube dysfunction) ICD-10-CM: H69.80  ICD-9-CM: 381.81  Unknown        Mixed hearing loss ICD-10-CM: H90.8  ICD-9-CM: 389.20  Unknown        Tracheostomy in place [facility]) (Chronic) ICD-10-CM: Z93.0  ICD-9-CM: V44.0  6/20/2016        Hypotension ICD-10-CM: I95.9  ICD-9-CM: 458.9  4/21/2016        Airway obstruction ICD-10-CM: J98.8  ICD-9-CM: 519.8  4/20/2016        Vocal cord paralysis ICD-10-CM: J38.00  ICD-9-CM: 478.30  4/20/2016        Dyspnea ICD-10-CM: R06.00  ICD-9-CM: 786.09  4/8/2016        Tracheal mass -- reproted sub-glotic ICD-10-CM: J39.8  ICD-9-CM: 786.6  3/25/2016        Hypertension ICD-10-CM: I10  ICD-9-CM: 401.9  3/25/2016        Dyslipidemia (Chronic) ICD-10-CM: E78.5  ICD-9-CM: 272.4  3/24/2016        Anxiety (Chronic) ICD-10-CM: F41.9  ICD-9-CM: 300.00  3/24/2016        Stridor ICD-10-CM: R06.1  ICD-9-CM: 786.1  3/24/2016        Condyloma acuminatum ICD-10-CM: A63.0  ICD-9-CM: 078.11  Unknown        Impotence of organic origin ICD-10-CM: N52.9  ICD-9-CM: 607.84  Unknown        Malignant neoplasm of prostate ([facility] ICD-10-CM: C61  ICD-9-CM: 601  Unknown        Malignant neoplasm of head, face, and neck ([facility] -- prior history. ICD-10-CM: C76.0  ICD-9-CM: 195.0  Unknown        Malignant neoplasm of parotid gland (HCC) (Chronic) ICD-10-CM: C07  ICD-9-CM: 142.0  Unknown        Male stress incontinence ICD-10-CM: N39.3  ICD-9-CM: 788.32  12/23/2014                RECOMMENDATIONS:  Stage IV Parotid Adenocarcinoma  - currently undergoing treatment with cetuximab, C1D1 given 2/14.  2/28 treatment held since pt was hospitalized. C2 due 3/14.  - Last staging scans in Jan 2018 showed stable disease    Bleeding from trach  - Pt had bleeding from trach yesterday. ENT consulted and not felt to be related to tumor involvement. Recommended routine cleaning, Ayr Saline Gel around trach, and suctioning of tracheotomy tube (without going so deep it causes pain)    RUL pneumonia  - Primary team managing  - On Levaquin    Lab studies and imaging studies were personally reviewed. Thank you for allowing us to participate in the care of Mr. Lombardi. We have nothing to add from oncological standpoint. We will sign off; please do not hesitate to call with any questions. He will need to f/u with Dr. Zach Triplett as previously scheduled on 3/14 at [place] [id number], [facility]  [address] [address]  Office : [phone]  Fax : [phone]       Attending Addendum:  I personally evaluated the patient with Damon Hollis N.P.,  and agree with the assessment, findings and plan as documented.   Appears stable, heart regular without murmur, lungs clear, abdomen benign. Elderly gentleman, metastatic parotid adenocarcinoma, admitted w PNA. Appears fairly debilitated. ENT following w trach care as outlined by them. Only recently started on Cetuximab. He would like to pursue further cancer directed therapy if possible but will need to improve clinically for this. F/u in oncology already scheduled for 3/14/18. Thank you for allowing us to participate in care of this pleasant patient. Please call w any questions.                 Tony Garcia MD  [address]  Office : [phone]  Fax : [phone]

## 2018-03-06 NOTE — PROGRESS NOTES
Rosa Sickle  Admission Date: 2/22/2018             Daily Progress Note: 3/6/2018    The patient's chart is reviewed and the patient is discussed with the staff. Subjective:     70yo AAM with h/o laryngeal cancer, permanent trach, admitted for change in secretions and SOB. CXR revealing right sided PNA. Culture back with pseudomonas. He has chronic trach for laryngeal cancer. Reviewed with him that typically treat for 7-14 days if not longer with this organism. Sensitive to FQ unfortunately he is allergic to these (though the allergy listed is muscle cramps). ENT saw yesterday regarding some hemoptysis and they did not think this was any involvement of the tumor with the trach site. He has been laying around since admit and is quite weak.     Current Facility-Administered Medications   Medication Dose Route Frequency    predniSONE (DELTASONE) tablet 30 mg  30 mg Oral DAILY WITH BREAKFAST    fentaNYL (DURAGESIC) 25 mcg/hr patch 1 Patch  1 Patch TransDERmal Q72H    camphor-menthol (SARNA) 0.5-0.5 % lotion   Topical BID PRN    white petrolatum-mineral oil (EUCERIN) cream   Topical PRN    levoFLOXacin (LEVAQUIN) tablet 750 mg  750 mg Oral Q24H    pantoprazole (PROTONIX) tablet 40 mg  40 mg Oral ACB    morphine 2 mg  2 mg IntraVENous Q4H PRN    polyethylene glycol (MIRALAX) packet 17 g  17 g Oral DAILY    docusate (COLACE) 50 mg/5 mL oral liquid 100 mg  100 mg Oral BID    gabapentin (NEURONTIN) capsule 100 mg  100 mg Per G Tube TID    aspirin chewable tablet 81 mg  81 mg Oral DAILY    clonazePAM (KlonoPIN) tablet 0.5 mg  0.5 mg Oral BID    HYDROcodone-acetaminophen (NORCO)  mg tablet 2 Tab  2 Tab Oral Q6H PRN    levothyroxine (SYNTHROID) tablet 137 mcg  137 mcg Oral ACB    metoprolol tartrate (LOPRESSOR) tablet 25 mg  25 mg Oral BID    mirtazapine (REMERON) tablet 15 mg  15 mg Oral QHS    sertraline (ZOLOFT) tablet 50 mg  50 mg Oral DAILY    sodium chloride (NS) flush 5-10 mL  5-10 mL IntraVENous Q8H    sodium chloride (NS) flush 5-10 mL  5-10 mL IntraVENous PRN    enoxaparin (LOVENOX) injection 40 mg  40 mg SubCUTAneous Q24H    budesonide (PULMICORT) 500 mcg/2 ml nebulizer suspension  500 mcg Nebulization BID RT       Review of Systems    Constitutional: negative for fever, chills, sweats  Cardiovascular: negative for chest pain, palpitations, syncope, edema  Gastrointestinal:  negative for dysphagia, reflux, vomiting, diarrhea, abdominal pain, or melena  Neurologic:  negative for focal weakness, numbness, headache    Objective:     Vitals:    03/05/18 1910 03/05/18 2310 03/06/18 0300 03/06/18 0731   BP:  135/65 129/80 106/61   Pulse:  70 77 76   Resp:  20 20 20   Temp:  98.7 °F (37.1 °C) 98.9 °F (37.2 °C) 97.5 °F (36.4 °C)   SpO2: 95% 93% 98% 98%   Weight:       Height:         Intake and Output:   03/04 1901 - 03/06 0700  In: 1752 [I.V.:20]  Out: -        Physical Exam:   Constitution:  the patient is well developed and in no acute distress  EENMT:  Sclera clear, pupils equal, oral mucosa moist  Respiratory:    clear  Cardiovascular:  RRR without M,G,R  Gastrointestinal: soft and non-tender; with positive bowel sounds. Musculoskeletal: warm without cyanosis. There is no lower leg edema. Skin:  no jaundice or rashes, trach  In place   Neurologic: no gross neuro deficits     Psychiatric: more lethargic today    CXR:       LAB  No results for input(s): GLUCPOC in the last 72 hours. No lab exists for component: GLPOC   No results for input(s): WBC, HGB, HCT, PLT, INR, HGBEXT, HCTEXT, PLTEXT in the last 72 hours. No lab exists for component: INREXT  No results for input(s): NA, K, CL, CO2, GLU, BUN, CREA, MG, CA, PHOS, TROIQ, ALB, TBIL, TBILI, GPT, ALT, SGOT, BNPP in the last 72 hours. No lab exists for component: TROIP  No results for input(s): PH, PCO2, PO2, HCO3 in the last 72 hours. No results for input(s): LCAD, LAC in the last 72 hours.       Assessment: (Medical Decision Making)     Hospital Problems  Date Reviewed: 2/27/2018          Codes Class Noted POA    Hemoptysis ICD-10-CM: R04.2  ICD-9-CM: 786.30  3/5/2018 Unknown    Due to tumor    Constipation (Chronic) ICD-10-CM: K59.00  ICD-9-CM: 564.00  2/27/2018 No        Hypothyroidism (Chronic) ICD-10-CM: E03.9  ICD-9-CM: 244.9  2/23/2018 Yes        GERD (gastroesophageal reflux disease) (Chronic) ICD-10-CM: K21.9  ICD-9-CM: 530.81  2/23/2018 Yes    Overview Signed 3/21/2016 11:50 AM by Holli Ny     controlled with medication             Tracheal obstruction ICD-10-CM: J39.8  ICD-9-CM: 519.19  2/23/2018 Yes        * (Principal)Community acquired pneumonia of right upper lobe of lung ([address] ICD-10-CM: J18.1  ICD-9-CM: 624  2/23/2018 Yes    pseudomonas    Laryngeal cancer ([address] ICD-10-CM: C32.9  ICD-9-CM: 161.9  2/23/2018 Yes        Tracheostomy dependence ([address] ICD-10-CM: Z93.0  ICD-9-CM: V44.0  2/23/2018 Yes              Plan:  (Medical Decision Making)   1      Po levaquin  2      Pt to evaluate  3     Oncology consult regarding rx plans  --    More than 50% of the time documented was spent in face-to-face contact with the patient and in the care of the patient on the floor/unit where the patient is located.     Nathan Hagen MD

## 2018-03-06 NOTE — PROGRESS NOTES
Problem: Mobility Impaired (Adult and Pediatric)  Goal: *Therapy Goal (Edit Goal, Insert Text)  STG:  (1.)Mr. Lombardi will move from supine to sit and sit to supine , scoot up and down and roll side to side with CONTACT GUARD ASSIST within 4 treatment day(s). (2.)Mr. Lombardi will transfer from bed to chair and chair to bed with CONTACT GUARD ASSIST using the least restrictive device within 4 treatment day(s). (3.)Mr. Lombardi will ambulate with CONTACT GUARD ASSIST for 200 feet with the least restrictive device within 4 treatment day(s). LTG:  (1.)Mr. Lombardi will move from supine to sit and sit to supine , scoot up and down and roll side to side in bed with STAND BY ASSIST within 7 treatment day(s). (2.)Mr. Lombardi will transfer from bed to chair and chair to bed with STAND BY ASSIST using the least restrictive device within 7 treatment day(s). (3.)Mr. Lombardi will ambulate with STAND BY ASSIST for 500 feet with the least restrictive device within 7 treatment day(s). ________________________________________________________________________________________________       PHYSICAL THERAPY: Daily Note, Treatment Day: 4th, AM 3/6/2018  INPATIENT: Hospital Day: 13  [place] / Product Type: Medicare /      NAME/AGE/GENDER: Chaparro Nunez is a 79 y.o. male   PRIMARY DIAGNOSIS: CAP (community acquired pneumonia) Community acquired pneumonia of right upper lobe of lung [address] Community acquired pneumonia of right upper lobe of lung ([facility]        ICD-10: Treatment Diagnosis:   · Difficulty in walking, Not elsewhere classified (R26.2)   Precaution/Allergies:  Ativan [lorazepam]; Levofloxacin; Other plant, animal, environmental; and Sulfa (sulfonamide antibiotics)      ASSESSMENT:     Mr. Dena Serra presents today supine in bed with his sister at the bedside providing support and care.   He was agreeable to trying to sit up as he has not for several days and should be going home later today. He required just minimal assist to sit up but his sitting balance required assistance with only moments of being able to maintain without support. He sat up about 10 minutes but became more fatigued. Sister talked about a trial of PT once home to see if he can get stronger. Talked to SW regarding. Goals left unmet. This section established at most recent assessment   PROBLEM LIST (Impairments causing functional limitations):  1. Decreased Strength  2. Decreased ADL/Functional Activities  3. Decreased Transfer Abilities  4. Decreased Ambulation Ability/Technique  5. Decreased Balance  6. Decreased Activity Tolerance  7. Decreased Pacing Skills  8. Decreased Flexibility/Joint Mobility  9. Decreased [facility] with Home Exercise Program   INTERVENTIONS PLANNED: (Benefits and precautions of physical therapy have been discussed with the patient.)  1. Balance Exercise  2. Bed Mobility  3. Family Education  4. Gait Training  5. Home Exercise Program (HEP)  6. Range of Motion (ROM)  7. Therapeutic Activites  8. Therapeutic Exercise/Strengthening  9. Transfer Training     TREATMENT PLAN: Frequency/Duration: 3-5 times a week for duration of hospital stay  Rehabilitation Potential For Stated Goals: Good     RECOMMENDED REHABILITATION/EQUIPMENT: (at time of discharge pending progress): Due to the probability of continued deficits (see above) this patient will likely need continued skilled physical therapy after discharge. Equipment:    Walkers, Type: Rolling Walker              HISTORY:   History of Present Injury/Illness (Reason for Referral): PER MD H&P   Patient is a 79 y.o.   male presents with cough , weakness, excessive trach secretions .     Patient was not able to give any hx , he has trach , he lives at home with hx of Laryngeal cancer , in the lat 24-48 hours , he had more secretions and SOB , he was given Augmentin from ENT but that didn't help      71-year-old male with a history of parotid Adenocarcinoma, tongue cancer, and prostate cancer with tracheostomy s/p chemo and radiation presents with sisters and son for excessive sleepiness today and agitation.  He had constipation last night and had a bowel movement after an enema.  He received his normal nighttime medications last night, and did not wake up as normal today.  Family states that they tried to saline suction his trach, but he became combative and frequently pulled out his inner cannula.  This resulted in a fair amount of bleeding from the tracheostomy.  They called the oncologist and was advised to come to the emergency department. Henri Perez was seen in the emergency Department February 11 twice in the same day for respiratory distress and mental status changes.  This was thought to be due to transient mucous plugging.  Also had elevation in the troponin that was thought to be rate related due to tachycardia.  He had CT of his head that showed sinusitis, CT of the soft tissue of the neck that showed expected findings from cancer and radiation, as well as a CT chest that was negative for pulmonary embolism.  He was followed up by oncologist Feb 14th and fentanyl patch was decreased from 50 to 25 mcg.  He was continued on Norco as needed for pain, Klonopin for anxiety, and Remeron for sleep.  Ritalin was discontinued as it was thought to have increased anxiety.  Patient currently complaining of chest pain, shortness of breath, throat pain, not feeling well. Henri Perez has had increasing yellow drainage from bilateral eyes today and has not received his normal eyedrops.  He has been unable to ambulate as usual for the past 2 weeks.  Had decreased urination today. ENT called in Augmentin yesterday for \"possible infection\" around trach, but was not evaluated in office.     Past Medical History/Comorbidities:   Mr. Angel Guerra  has a past medical history of Airway obstruction (4/20/2016); Allergic rhinitis;  Anxiety (3/24/2016); BPH (benign prostatic hypertrophy); Cerumen impaction; Chronic otitis media; Chronic pharyngitis; Condyloma acuminatum; Dyslipidemia (3/24/2016); Dyspnea (4/8/2016); ETD (eustachian tube dysfunction); Former smoker; GERD (gastroesophageal reflux disease); History of condyloma acuminatum; History of parotid cancer ( ); History of sinus cancer (1989); Hypercholesterolemia (9/14/2016); Hypertension; Hypotension (4/21/2016); Hypothyroidism ( ); Ill-defined condition; Impotence of organic origin; Male stress incontinence (12/23/2014); Malignant neoplasm of head, face, and neck ([address] -- prior history.; Malignant neoplasm of parotid gland ([address] (2009); Malignant neoplasm of prostate ([address] Mixed hearing loss; Nasopharyngeal cancer ([address] Neoplasm of uncertain behavior of tongue; Otitis media; Parotid gland adenocarcinoma ([address] Perforation of tympanic membrane; Personal history of prostate cancer; Prostate cancer ([address] SOB (shortness of breath); Stridor (3/24/2016); 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 Mr. Angel Guerra  has a past surgical history that includes hx radical prostatectomy (5/2011); hx gi; hx heent (2009); hx heent (2010); pr sinus surgery proc unlisted (1989); and hx myringotomy (Bilateral, 2010, 2011, 2012). Social History/Living Environment:   Home Environment: Trailer/mobile home  # Steps to Enter: 9  One/Two Story Residence: Two story, live on 1st floor  # of Interior Steps: [address]: Right  Lift Chair Available: No  Living Alone: No  Support Systems: Holiness / dayan community, Family member(s)  Patient Expects to be Discharged to[de-identified] Trailer/mobile home  Current DME Used/Available at [facility]: Nebulizer, ned Snider, fco Snider  Prior Level of Function/Work/Activity:  Patient with declining functional mobility concurrent with progression of chemotherapy and radiation. Dominant Side:         RIGHT  Personal Factors:          Sex:  male        Age:  79 y.o. Number of Personal Factors/Comorbidities that affect the Plan of Care: 1-2: MODERATE COMPLEXITY   EXAMINATION:   Most Recent Physical Functioning:   Gross Assessment:                  Posture:     Balance:  Sitting - Static: Fair (occasional); Poor (constant support)  Sitting - Dynamic: Fair (occasional) Bed Mobility:  Supine to Sit: Minimum assistance  Sit to Supine: Minimum assistance  Wheelchair Mobility:     Transfers:     Gait:            Body Structures Involved:  1. Bones  2. Joints  3. Muscles  4. Ligaments Body Functions Affected:  1. Neuromusculoskeletal  2. Movement Related  3. Skin Related  4. Metobolic/Endocrine Activities and Participation Affected:  1. General Tasks and Demands  2. Communication  3. Mobility  4. Self Care  5. Domestic Life  6. Community, Social and [place]   Number of elements that affect the Plan of Care: 3: MODERATE COMPLEXITY   CLINICAL PRESENTATION:   Presentation: Stable and uncomplicated: LOW COMPLEXITY   CLINICAL DECISION MAKIN[address] [place] [address] AM-PAC 6 Clicks   Basic Mobility Inpatient Short Form  How much difficulty does the patient currently have. .. Unable A Lot A Little None   1. Turning over in bed (including adjusting bedclothes, sheets and blankets)? [] 1   [] 2   [x] 3   [] 4   2. Sitting down on and standing up from a chair with arms ( e.g., wheelchair, bedside commode, etc.)   [] 1   [] 2   [x] 3   [] 4   3. Moving from lying on back to sitting on the side of the bed? [] 1   [] 2   [x] 3   [] 4   How much help from another person does the patient currently need. .. Total A Lot A Little None   4. Moving to and from a bed to a chair (including a wheelchair)? [x] 1   [] 2   [] 3   [] 4   5. Need to walk in hospital room? [x] 1   [] 2   [] 3   [] 4   6. Climbing 3-5 steps with a railing?    [x] 1   [] 2   [] 3   [] 4   © , Trustees of [address] [place], [place] [address], under license to [employer]. All rights reserved      Score:  Initial: 12 Most Recent: X (Date: -- )    Interpretation of Tool:  Represents activities that are increasingly more difficult (i.e. Bed mobility, Transfers, Gait). Score 24 23 22-20 19-15 14-10 9-7 6     Modifier CH CI CJ CK CL CM CN      ? Mobility - Walking and Moving Around:     - CURRENT STATUS: CL - 60%-79% impaired, limited or restricted    - GOAL STATUS: CK - 40%-59% impaired, limited or restricted    - D/C STATUS:  ---------------To be determined---------------  [place] / Product Type: Medicare /      Medical Necessity:     · Patient demonstrates good rehab potential due to higher previous functional level. Reason for Services/Other Comments:  · Patient continues to require skilled intervention due to medical complications, patient unable to attend/participate in therapy as expected and debility with decreased mobility s/p chemotherapy and radiation treatments. .   Use of outcome tool(s) and clinical judgement create a POC that gives a: Clear prediction of patient's progress: LOW COMPLEXITY            TREATMENT:   (In addition to Assessment/Re-Assessment sessions the following treatments were rendered)   Pre-treatment Symptoms/Complaints: Patient is non verbal  Pain: Initial:   Pain Intensity 1: 0  Post Session:  0/10      Therapeutic Activity: (   15 min): Therapeutic activities including Bed transfers, static sitting balance   to improve mobility, strength, balance and coordination. Required minimum to moderate assist with  tactile and verbal cues   to promote static and dynamic balance in sitting.       Date:  2/26/18 Date:  2/27/18 Date:  3/2/18   Activity/Exercise Parameters Parameters Parameters   Quad sets X 10 B     AP X 10 B 2 x 10 30   LAQ X 8 B 2 x 10 20   Heel slides in supine  2 x 10 20   Hip abd/adduction in supine  2 x 10 20   AA SLR  10 B 20   Sit to stand   3 times with use of bed elevation    Treatment/Session Assessment:    · Response to Treatment:  Tolerated fair    · Interdisciplinary Collaboration:   o Physical Therapy Assistant  o Registered Nurse  · After treatment position/precautions:   o Supine in bed  o Bed/Chair-wheels locked  o Bed in low position  o Call light within reach  o RN notified  o Family at bedside   · Compliance with Program/Exercises: compliant  · Recommendations/Intent for next treatment session: \"Next visit will focus on advancements to more challenging activities, reduction in assistance provided and transfers, ambulation and mobility. \".   Total Treatment Duration:  PT Patient Time In/Time Out  Time In: 1015  Time Out: Patti 87, PTA

## 2018-03-07 NOTE — PROGRESS NOTES
Discharge instructions and prescriptions provided and explained to the pt's spouse. Med side effect sheet reviewed. Opportunity for questions provided. Pt will transport home via [place].

## 2018-03-07 NOTE — DISCHARGE SUMMARY
Discharge Note    Ramses Montalvo  Admission date:  2/22/2018  Discharge date:  3/7/2018     Admitting Diagnosis:  CAP (community acquired pneumonia)    Discharge Diagnoses:   Hospital Problems  Date Reviewed: 2/27/2018          Codes Class Noted POA    Hemoptysis ICD-10-CM: R04.2  ICD-9-CM: 786.30  3/5/2018 Unknown        Constipation (Chronic) ICD-10-CM: K59.00  ICD-9-CM: 564.00  2/27/2018 No        Hypothyroidism (Chronic) ICD-10-CM: E03.9  ICD-9-CM: 244.9  2/23/2018 Yes        GERD (gastroesophageal reflux disease) (Chronic) ICD-10-CM: K21.9  ICD-9-CM: 530.81  2/23/2018 Yes    Overview Signed 3/21/2016 11:50 AM by Lady Mackay     controlled with medication             Tracheal obstruction ICD-10-CM: J39.8  ICD-9-CM: 519.19  2/23/2018 Yes        * (Principal)Community acquired pneumonia of right upper lobe of lung ([facility] ICD-10-CM: J18.1  ICD-9-CM: 380  2/23/2018 Yes        Laryngeal cancer ([facility] ICD-10-CM: C32.9  ICD-9-CM: 161.9  2/23/2018 Yes        Tracheostomy dependence ([facility] ICD-10-CM: Z93.0  ICD-9-CM: V44.0  2/23/2018 Yes              Consultants:  ENT  Oncology  GI  PT    Studies/Procedures:  CXR--multiple    Condition on Discharge:  stable    Disposition:  Home with home health services      Presenting Illness:      Hospital course:  70yo AAM with 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 He was admitted for change in secretions and SOB. CXR revealing right sided PNA. Culture back with pseudomonas. He has chronic trach for laryngeal cancer. Reviewed with him that typically treat for 7-14 days if not longer with this organism. Sensitive to flouroquinolones--had allergy listed of muscle spasms but was able to transition to Levaquin with no problems. PT assisted with mobility--patient very weak and only able to sit on side of bed. ENT consulted for bleeding from laryngeal tumor--no intervention needed and outpatient follow up recommended. Oncology consulted for chemotherapy plan and will continue treatment as out patient. He is now ready for discharge to home with home health nurses, DME equipment, aides and PT.  DNR status will be continued. Will continue Levaquin for 21 day course. Physical Exam:   Constitution:  the patient is thin and frail appearing, TC 28%. EENMT:  Sclera clear, pupils equal, oral mucosa moist, trach  Respiratory: Trach in place. R>L rhonchi, less tracheal secretions tan   Cardiovascular:  RRR without M,G,R  Gastrointestinal: soft, flat with positive bowel sounds, PEG and receives bolus TFs, having BMs. Musculoskeletal: warm without cyanosis. There is no lower leg edema. Skin:  no jaundice or rashes, no wounds   Neurologic: Some left sided weakness in face. Psychiatric:  alert nods to questions. Unable to speak. LAB  No results for input(s): WBC, HGB, HCT, PLT, INR, HGBEXT, HCTEXT, PLTEXT in the last 72 hours. No lab exists for component: INREXT  No results for input(s): NA, K, CL, CO2, BUN, CREA, MG, PHOS, ALB, TBIL, GPT, BNPP in the last 72 hours. No lab exists for component: TROIP,  SGOT  No results for input(s): PH, PCO2, PO2, HCO3 in the last 72 hours. Discharge Medications:   Current Discharge Medication List      START taking these medications    Details   polyethylene glycol (MIRALAX) 17 gram packet Take 1 Packet by mouth daily. Qty: 30 Packet, Refills: 11      levoFLOXacin (LEVAQUIN) 750 mg tablet Take 1 Tab by mouth every twenty-four (24) hours for 14 days. Qty: 14 Tab, Refills: 0      gabapentin (NEURONTIN) 100 mg capsule 1 Cap by Per G Tube route three (3) times daily. Qty: 90 Cap, Refills: 3      predniSONE (DELTASONE) 10 mg tablet Take 3 tablets daily for 3 days then, take 2 tablets daily for 3 days then, take 1 tablet daily for 3 days then, take 1/2 tablet daily for 3 days then stop.   Qty: 25 Tab, Refills: 0         CONTINUE these medications which have NOT CHANGED    Details aspirin 81 mg chewable tablet Take 1 Tab by mouth daily. Qty: 30 Tab, Refills: 11      metoprolol tartrate (LOPRESSOR) 25 mg tablet Take 1 Tab by mouth two (2) times a day. Qty: 60 Tab, Refills: 11      mirtazapine (REMERON) 15 mg tablet Take 1 Tab by mouth nightly. Qty: 30 Tab, Refills: 6    Associated Diagnoses: Insomnia, unspecified type      Omeprazole delayed release (PRILOSEC D/R) 20 mg tablet Take 20 mg by mouth daily. fentaNYL (DURAGESIC) 50 mcg/hr PATCH 1 Patch by TransDERmal route every seventy-two (72) hours. Max Daily Amount: 1 Patch. Qty: 10 Patch, Refills: 0    Associated Diagnoses: Cancer related pain      HYDROcodone-acetaminophen (NORCO)  mg tablet Take 1 Tab by mouth every eight (8) hours as needed for Pain. Max Daily Amount: 3 Tabs. Qty: 90 Tab, Refills: 0    Associated Diagnoses: Cancer related pain      sertraline (ZOLOFT) 50 mg tablet Take 1 Tab by mouth daily. Qty: 30 Tab, Refills: 3      ondansetron hcl (ZOFRAN) 8 mg tablet Take 1 Tab by mouth every eight (8) hours as needed for Nausea. Qty: 45 Tab, Refills: 1      levothyroxine (SYNTHROID) 100 mcg tablet Take 137 mcg by mouth Daily (before breakfast). clonazePAM (KLONOPIN) 0.5 mg tablet Take 0.5 mg by mouth three (3) times daily as needed. Indications: rarely uses         STOP taking these medications       amoxicillin-clavulanate (AUGMENTIN ES-600) 600-42.9 mg/5 mL suspension Comments:   Reason for Stopping:         dexamethasone (DECADRON) 4 mg tablet Comments:   Reason for Stopping:         doxycycline (ADOXA) 100 mg tablet Comments:   Reason for Stopping: Followup/Outpt Studies:  --Will follow up appointment with [facility] if needed. --Continue Levaquin for 14 more days to complete 21 day course. --Follow up with oncology in 1 week. --Follow up with Dr. Josue Mattson, ENT, this week. --Will continue trach collar, home health nurse and PT and aides to assist with his care at home. --Total discharge greater than 30 minutes in duration. More than 50% of the time documented was spent in face-to-face contact with the patient and in the care of the patient on the floor/unit where the patient is located. Asael Charles NP  Lungs:  clear  Heart:  RRR with no Murmur/Rubs/Gallops    Additional Comments:  Home with home pt and home health nurses- pt. May elect not to continue chemo due to muliple side effects    I have spoken with and examined the patient. I agree with the above assessment and plan as documented.     Conner Corey MD

## 2018-03-07 NOTE — PROGRESS NOTES
Patient will require ambulance transport home- will need a stair chair to transport to 2nd level of home. [facility]

## 2018-03-07 NOTE — PROGRESS NOTES
Patient will be followed for Pneumonia Bundled Payment. Patient Care Setting  Care Setting Type: [facility]  Patient Care Setting: Home with sister, [facility] and Health  to follow. [place] Date: 03/07/18  Care Coordinator conducted hand-off?: Yes  Care Coordinator: Indira Vila and [address].   [facility].  Patient Care Plan: On Track

## 2018-03-07 NOTE — PROGRESS NOTES
Bedside report received from Jodee, [facility]. Assessment completed. Pt is lying in bed with trach in place at this time. Pt A&O x 4. Respirations even and unlabored. No s/sx of acute pain or distress. Bed in low, locked position. Call light within reach. Pt instructed to call for assistance. Will monitor.

## 2018-03-07 NOTE — DISCHARGE INSTRUCTIONS
Followup/Outpt Studies:  --Will follow up appointment with [facility] if needed. --Continue Levaquin for 14 more days to complete 21 day course. --Follow up with oncology in 1 week. --Follow up with Dr. Ayaz Vu, ENT, this week. --Will continue trach collar, home health nurse and PT and aides to assist with his care at home. Pneumonia: Care Instructions  Your Care Instructions    Pneumonia is an infection of the lungs. Most cases are caused by infections from bacteria or viruses. Pneumonia may be mild or very severe. If it is caused by bacteria, you will be treated with antibiotics. It may take a few weeks to a few months to recover fully from pneumonia, depending on how sick you were and whether your overall health is good. Follow-up care is a key part of your treatment and safety. Be sure to make and go to all appointments, and call your doctor if you are having problems. It's also a good idea to know your test results and keep a list of the medicines you take. How can you care for yourself at home? · Take your antibiotics exactly as directed. Do not stop taking the medicine just because you are feeling better. You need to take the full course of antibiotics. · Take your medicines exactly as prescribed. Call your doctor if you think you are having a problem with your medicine. · Get plenty of rest and sleep. You may feel weak and tired for a while, but your energy level will improve with time. · To prevent dehydration, drink plenty of fluids, enough so that your urine is light yellow or clear like water. Choose water and other caffeine-free clear liquids until you feel better. If you have kidney, heart, or liver disease and have to limit fluids, talk with your doctor before you increase the amount of fluids you drink. · Take care of your cough so you can rest. A cough that brings up mucus from your lungs is common with pneumonia. It is one way your body gets rid of the infection. But if coughing keeps you from resting or causes severe fatigue and chest-wall pain, talk to your doctor. He or she may suggest that you take a medicine to reduce the cough. · Use a vaporizer or humidifier to add moisture to your bedroom. Follow the directions for cleaning the machine. · Do not smoke or allow others to smoke around you. Smoke will make your cough last longer. If you need help quitting, talk to your doctor about stop-smoking programs and medicines. These can increase your chances of quitting for good. · Take an over-the-counter pain medicine, such as acetaminophen (Tylenol), ibuprofen (Advil, Motrin), or naproxen (Aleve). Read and follow all instructions on the label. · Do not take two or more pain medicines at the same time unless the doctor told you to. Many pain medicines have acetaminophen, which is Tylenol. Too much acetaminophen (Tylenol) can be harmful. · If you were given a spirometer to measure how well your lungs are working, use it as instructed. This can help your doctor tell how your recovery is going. · To prevent pneumonia in the future, talk to your doctor about getting a flu vaccine (once a year) and a pneumococcal vaccine (one time only for most people). When should you call for help? Call 911 anytime you think you may need emergency care. For example, call if:  ? · You have severe trouble breathing. ?Call your doctor now or seek immediate medical care if:  ? · You cough up dark brown or bloody mucus (sputum). ? · You have new or worse trouble breathing. ? · You are dizzy or lightheaded, or you feel like you may faint. ? Watch closely for changes in your health, and be sure to contact your doctor if:  ? · You have a new or higher fever. ? · You are coughing more deeply or more often. ? · You are not getting better after 2 days (48 hours). ? · You do not get better as expected. Where can you learn more?   Go to http://suki.info/. Enter 01.84.63.10.33 in the search box to learn more about \"Pneumonia: Care Instructions. \"  Current as of: May 12, 2017  Content Version: 11.4  © 1804-2573 [employer]. Care instructions adapted under license by [employer] (which disclaims liability or warranty for this information). If you have questions about a medical condition or this instruction, always ask your healthcare professional. [place] [address] any warranty or liability for your use of this information. DISCHARGE SUMMARY from Nurse    PATIENT INSTRUCTIONS:    After general anesthesia or intravenous sedation, for 24 hours or while taking prescription Narcotics:  · Limit your activities  · Do not drive and operate hazardous machinery  · Do not make important personal or business decisions  · Do  not drink alcoholic beverages  · If you have not urinated within 8 hours after discharge, please contact your surgeon on call. Report the following to your surgeon:  · Excessive pain, swelling, redness or odor of or around the surgical area  · Temperature over 100.5  · Nausea and vomiting lasting longer than 4 hours or if unable to take medications  · Any signs of decreased circulation or nerve impairment to extremity: change in color, persistent  numbness, tingling, coldness or increase pain  · Any questions    What to do at Home:    *  Please give a list of your current medications to your Primary Care Provider. *  Please update this list whenever your medications are discontinued, doses are      changed, or new medications (including over-the-counter products) are added. *  Please carry medication information at all times in case of emergency situations.     These are general instructions for a healthy lifestyle:    No smoking/ No tobacco products/ Avoid exposure to second hand smoke  Surgeon General's Warning:  Quitting smoking now greatly reduces serious risk to your health. Obesity, smoking, and sedentary lifestyle greatly increases your risk for illness    A healthy diet, regular physical exercise & weight monitoring are important for maintaining a healthy lifestyle    You may be retaining fluid if you have a history of heart failure or if you experience any of the following symptoms:  Weight gain of 3 pounds or more overnight or 5 pounds in a week, increased swelling in our hands or feet or shortness of breath while lying flat in bed. Please call your doctor as soon as you notice any of these symptoms; do not wait until your next office visit. Recognize signs and symptoms of STROKE:    F-face looks uneven    A-arms unable to move or move unevenly    S-speech slurred or non-existent    T-time-call 911 as soon as signs and symptoms begin-DO NOT go       Back to bed or wait to see if you get better-TIME IS BRAIN. Warning Signs of HEART ATTACK     Call 911 if you have these symptoms:   Chest discomfort. Most heart attacks involve discomfort in the center of the chest that lasts more than a few minutes, or that goes away and comes back. It can feel like uncomfortable pressure, squeezing, fullness, or pain.  Discomfort in other areas of the upper body. Symptoms can include pain or discomfort in one or both arms, the back, neck, jaw, or stomach.  Shortness of breath with or without chest discomfort.  Other signs may include breaking out in a cold sweat, nausea, or lightheadedness. Don't wait more than five minutes to call 911 - MINUTES MATTER! Fast action can save your life. Calling 911 is almost always the fastest way to get lifesaving treatment. Emergency Medical Services staff can begin treatment when they arrive -- up to an hour sooner than if someone gets to the hospital by car. The discharge information has been reviewed with the patient. The patient verbalized understanding.   Discharge medications reviewed with the patient and appropriate educational materials and side effects teaching were provided.   ___________________________________________________________________________________________________________________________________

## 2018-03-13 PROBLEM — C32.9 LARYNGEAL CANCER (HCC): Chronic | Status: ACTIVE | Noted: 2018-01-01

## 2018-03-13 PROBLEM — Z93.0 TRACHEOSTOMY DEPENDENCE (HCC): Chronic | Status: ACTIVE | Noted: 2018-01-01

## 2018-03-13 NOTE — PROCEDURES
PROCEDURE:    DIAGNOSTIC/THERAPEUTIC THORACENTESIS/PLEURAL MANNOMETRY. PRE-OP DIAGNOSIS:    L PLEURAL EFFUSION    POST-OP DIAGNOSIS:    L PLEURAL EFFUSION    ASSISTANT:    Plumbly    ANESTHESIA:    LOCAL ANESTHESIA WITH 1% LIDOCAINE 10 CC TOTAL. CHEST ULTRASOUND FINDINGS:    A Turbo-M, Sonosite ultrasound with a 5-16 mHz probe was used to image the chest and localize the pleural effusion on the Left/and/Right chest.    A /moderate/ anechoic space was seen on the Left/ consistent with an uncomplicated pleural effusion. DESCRIPTION OF PROCEDURE:    After obtaining informed consent and localizing the safest location for thoracentesis, the  8th intercostal space was marked with a blunt, plastic needle cap in the mid scapular line. An Tansler AK-0100 Pleral-Seal thoracentesis kit was used to perform the procedure. The skin was  cleansed with the supplied  chlorhexididne swab and then draped in the usual fasion. Using the previously marked location as a giude, a 22 G 1.5 inch needle was used to inject 10 cc of 1% lidocaine into the skin and subcutaneous tissue, as well as onto the underlying rib and inter-costal muscles, pleural fluid was aspirated to assure proper location, prior to removing the anesthesia needle. A 3mm  incision was then made, with the supplied scalpel in the usual fashion to facilitate the insertiopn of the thoracentesis needle. The needle with an 8French thoracentesis catheter was then introduced into the chest through the previously made incision in the usual fashion, the rib localized with the needle, and the catheter then marched over the rib into the pleural space. After aspirating fluid, the thoracentesis catheter was then placed into the chest using the needle itself as a trocar. The needle was then removed and the catheter was attached to the supplied tubing without complication.     900 cc of /Yellow/ fluid, was aspirated and sent for analysis. Fluid was sent for the following tests:    Cell count with differential  LDH  Glucose  Total protein  Cytology    AFB  Fungus  Routine culture and Gram stain      Post procedure US confirmed complete/incomplete drainage of the effusion.     EBL:   minimal      COMPLICATIONS:    none    Alejandra Iniguez MD

## 2018-03-13 NOTE — H&P
Date of Surgery Update:  Harish Collazo was seen and examined. History and physical has been reviewed. The patient has been examined.  There have been no significant clinical changes since the completion of the originally dated History and Physical.    Signed By: Claudell Nones, MD     March 13, 2018 5:29 PM

## 2018-03-13 NOTE — ED PROVIDER NOTES
HPI Comments: 57-year-old male who is coming in with hypoxia at home. He has a history of having a tracheostomy due to laryngeal cancer. He was in chemotherapy and radiation but last week decided he was stopped going because he was tired of it and did not want to go through it anymore. His oxygen level was around 80 and he does not have any home oxygen. They did remove his fentanyl patch. Patient is a 79 y.o. male presenting with respiratory distress syndrome. The history is provided by the patient. Respiratory Distress   Pertinent negatives include no fever, no neck pain, no wheezing, no chest pain, no vomiting and no abdominal pain. Past Medical History:   Diagnosis Date    Airway obstruction 4/20/2016    Allergic rhinitis     Anxiety 3/24/2016    BPH (benign prostatic hypertrophy)     Cerumen impaction     Chronic otitis media     Chronic pharyngitis     Condyloma acuminatum     Dyslipidemia 3/24/2016    Dyspnea 4/8/2016    ETD (eustachian tube dysfunction)     Former smoker     GERD (gastroesophageal reflux disease)     at times    History of condyloma acuminatum     on tongue    History of parotid cancer      History of sinus cancer 1989    Hypercholesterolemia 9/14/2016    Hypertension     Hypotension 4/21/2016    Hypothyroidism      secondary to radiation to neck    Ill-defined condition     left face twitching    Impotence of organic origin     Male stress incontinence 12/23/2014    Malignant neoplasm of head, face, and neck ([address] -- prior history.     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HCC)     Perforation of tympanic membrane     Personal history of prostate cancer     Prostate cancer ([address]     diagnosed 2/24/11    SOB (shortness of breath)     Stridor 3/24/2016    Tracheal mass -- reproted sub-glotic 3/25/2016    Tracheal obstruction 3/24/2016    Tracheostomy in place [facility]) 6/20/2016    Vocal cord paralysis 4/20/2016       Past Surgical History:   Procedure Laterality Date    HX GI      esophageal dilation    HX HEENT  2009    Removal lymph nodes/saliva glands and radiation/chemo    HX HEENT  2010    left total parotidectomy & bilateral radical neck dissection    HX MYRINGOTOMY Bilateral 2010, 2011, 2012 2010-Dr. Villanueva Ped  5[facility]2011     [facility]    lymph nodes removed         Family History:   Problem Relation Age of Onset    Hypertension Mother     Cancer Mother      breast    Hypertension Father     Heart Disease Father        Social History     Social History    Marital status: SINGLE     Spouse name: N/A    Number of children: N/A    Years of education: N/A     Occupational History    [employer]      11 years     Social History Main Topics    Smoking status: Former Smoker     Packs/day: 0.25     Years: 8.00     Types: Cigarettes     Quit date: 4/20/1976    Smokeless tobacco: Never Used      Comment: Quit in the 1970s    Alcohol use No    Drug use: No    Sexual activity: Not on file     Other Topics Concern    Not on file     Social History Narrative    Lives at home and his sister and son live with him. ALLERGIES: Ativan [lorazepam]; Levofloxacin; Other plant, animal, environmental; and Sulfa (sulfonamide antibiotics)    Review of Systems   Constitutional: Negative for chills and fever. Respiratory: Negative for chest tightness, shortness of breath, wheezing and stridor. Cardiovascular: Negative for chest pain and palpitations. Gastrointestinal: Negative for abdominal pain, diarrhea, nausea and vomiting. Musculoskeletal: Negative for neck pain and neck stiffness. Skin: Negative.     All other systems reviewed and are negative. Vitals:    03/13/18 1425 03/13/18 1428 03/13/18 1500 03/13/18 1504   BP: 104/65   115/72   Pulse: 80   81   Resp: 22   20   Temp: 98.6 °F (37 °C)      SpO2: 99% 99% 99% 99%   Weight: 57.2 kg (126 lb)      Height: 5' 6\" (1.676 m)               Physical Exam   Constitutional: He is oriented to person, place, and time. He appears well-developed and well-nourished. No distress. HENT:   Head: Normocephalic and atraumatic. Tracheostomy. Eyes: Conjunctivae are normal. No scleral icterus. Neck: Normal range of motion. Neck supple. Cardiovascular: Normal rate, regular rhythm and normal heart sounds. Pulmonary/Chest: Effort normal and breath sounds normal. No stridor. No respiratory distress. He has no wheezes. He has no rales. He exhibits no tenderness. Abdominal: Soft. He exhibits no distension. There is no tenderness. There is no rebound and no guarding. Neurological: He is alert and oriented to person, place, and time. No focal weakness   Skin: Skin is warm and dry. No rash noted. He is not diaphoretic. No erythema. Psychiatric: He has a normal mood and affect. His behavior is normal.   Nursing note and vitals reviewed. MDM  Number of Diagnoses or Management Options  Diagnosis management comments: Patient has new pleural effusion along with new hypoxia. On further questioning family they state he has had this last year and had to have it drained. I have paged pulmonary for evaluation. Patient and family state they would like to be treated and that they would consider hospice as this patient is planning on stopping any further chemotherapy. Corry Rae MD; 3/13/2018 @3:58 PM Voice dictation software was used during the making of this note. This software is not perfect and grammatical and other typographical errors may be present.   This note has not been proofread for errors.  =================================================================== Pulmonary has evaluated patient and they drained 900 mL of the patient's lung and he feels better. He is no longer requiring oxygen. They spoke with him about going home. Family also will follow up with hospice as he no longer wants aggressive treatment.   Danielito Bañuelos MD 5:30 PM         Amount and/or Complexity of Data Reviewed  Clinical lab tests: ordered and reviewed (Results for orders placed or performed during the hospital encounter of 03/13/18  -CBC WITH AUTOMATED DIFF       Result                                            Value                         Ref Range                       WBC                                               10.4                          4.3 - 11.1 K/uL                 RBC                                               4.17 (L)                      4.23 - 5.67 M/uL                HGB                                               11.6 (L)                      13.6 - 17.2 g/dL                HCT                                               36.9 (L)                      41.1 - 50.3 %                   MCV                                               88.5                          79.6 - 97.8 FL                  MCH                                               27.8                          26.1 - 32.9 PG                  MCHC                                              31.4                          31.4 - 35.0 g/dL                RDW                                               14.9 (H)                      11.9 - 14.6 %                   PLATELET                                          403                           150 - 450 K/uL                  MPV                                               9.4 (L)                       10.8 - 14.1 FL                  DF                                                AUTOMATED                                                     NEUTROPHILS                                       92 (H)                        43 - 78 % LYMPHOCYTES                                       4 (L)                         13 - 44 %                       MONOCYTES                                         3 (L)                         4.0 - 12.0 %                    EOSINOPHILS                                       0 (L)                         0.5 - 7.8 %                     BASOPHILS                                         0                             0.0 - 2.0 %                     IMMATURE GRANULOCYTES                             1                             0.0 - 5.0 %                     ABS. NEUTROPHILS                                  9.6 (H)                       1.7 - 8.2 K/UL                  ABS. LYMPHOCYTES                                  0.4 (L)                       0.5 - 4.6 K/UL                  ABS. MONOCYTES                                    0.3                           0.1 - 1.3 K/UL                  ABS. EOSINOPHILS                                  0.0                           0.0 - 0.8 K/UL                  ABS. BASOPHILS                                    0.0                           0.0 - 0.2 K/UL                  ABS. IMM.  GRANS.                                  0.2                           0.0 - 0.5 K/UL             -METABOLIC PANEL, COMPREHENSIVE       Result                                            Value                         Ref Range                       Sodium                                            139                           136 - 145 mmol/L                Potassium                                         4.6                           3.5 - 5.1 mmol/L                Chloride                                          99                            98 - 107 mmol/L                 CO2                                               31                            21 - 32 mmol/L                  Anion gap                                         9                             7 - 16 mmol/L                   Glucose 157 (H)                       65 - 100 mg/dL                  BUN                                               21                            8 - 23 MG/DL                    Creatinine                                        0.59 (L)                      0.8 - 1.5 MG/DL                 GFR est AA                                        >60                           >60 ml/min/1.73m2               GFR est non-AA                                    >60                           >60 ml/min/1.73m2               Calcium                                           8.6                           8.3 - 10.4 MG/DL                Bilirubin, total                                  0.1 (L)                       0.2 - 1.1 MG/DL                 ALT (SGPT)                                        33                            12 - 65 U/L                     AST (SGOT)                                        21                            15 - 37 U/L                     Alk. phosphatase                                  176 (H)                       50 - 136 U/L                    Protein, total                                    6.3                           6.3 - 8.2 g/dL                  Albumin                                           1.8 (L)                       3.2 - 4.6 g/dL                  Globulin                                          4.5 (H)                       2.3 - 3.5 g/dL                  A-G Ratio                                         0.4 (L)                       1.2 - 3.5                  -POC LACTIC ACID       Result                                            Value                         Ref Range                       Lactic Acid (POC)                                 2.1 (H)                       0.5 - 1.9 mmol/L          )  Tests in the radiology section of CPT®: ordered and reviewed (Xr Chest Port    Result Date: 3/13/2018  Portable chest: History: shob.   End-stage lung carcinoma Comparison: 03/04/2018 Findings: A single view of the chest was obtained at 1431 hours. Tracheostomy tube is midline. The cardiac and mediastinal silhouette are normal in size and configuration. There is a small to moderate left pleural effusion which has developed. There are additional mild bibasilar airspace opacities. IMPRESSION: 1. Small to moderate-sized left pleural effusion.  2. Mild bibasilar airspace opacities.    )  Independent visualization of images, tracings, or specimens: yes          ED Course       Procedures

## 2018-03-13 NOTE — PROGRESS NOTES
Pt sat up on side of bed for thoracentesis. Consent obtained. Time out performed. Pts vitals monitored throughout procedure. Left ultrasound done and pic taken of pleural fluid.  ~900 ml yellow pleural fluid from L. Pt tolerated procedure well with no adverse rxn. Specimens sent to the lab x 3 and labeled appropriately. Site dressed appropriately and report given to pts RN.

## 2018-03-13 NOTE — CONSULTS
PULMONARY/CCM CONSULT :  3/13/2018    Date of Admission:  3/13/2018    The patient's chart has been reviewed and the chart has been discussed with nursing staff. Subjective: This patient has been seen and evaluated in the ER. Patient is a 79 y.o.  male presents with SOB with pleural effusion. He was just here and discharged on 3/7/2018. He has known 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 He was admitted PNA. CXR revealing right sided PNA. Culture back with pseudomonas, sensitive to flouroquinolones--had allergy listed of muscle spasms but was able to transition to Levaquin with no problems. ENT consulted for bleeding from laryngeal tumor--no intervention needed and outpatient follow up recommended. He had plans to follow up with Oncologyfor chemotherapy as out patient. He was discharged home with home health nurses, DME equipment, aides and PT.  DNR status will be continued. He planned to continue Levaquin for 21 day course.     Today, CXR shows effusion. WBC WNL. He is only on 2 lpm via trach collar. We were asked to evaluate for effusion. He was seen by home health N today and was found to be hypoxic and he is not on O2 at home. He has chronic hemoptysis with tumor according to his family. He last had a thoracentesis in July 2017.       Past Medical History:   Diagnosis Date    Airway obstruction 4/20/2016    Allergic rhinitis     Anxiety 3/24/2016    BPH (benign prostatic hypertrophy)     Cerumen impaction     Chronic otitis media     Chronic pharyngitis     Condyloma acuminatum     Dyslipidemia 3/24/2016    Dyspnea 4/8/2016    ETD (eustachian tube dysfunction)     Former smoker     GERD (gastroesophageal reflux disease)     at times    History of condyloma acuminatum     on tongue    History of parotid cancer      History of sinus cancer 1989    Hypercholesterolemia 9/14/2016    Hypertension     Hypotension 4/21/2016    Hypothyroidism      secondary to radiation to neck    Ill-defined condition     left face twitching    Impotence of organic origin     Male stress incontinence 12/23/2014    Malignant neoplasm of head, face, and neck ([address] -- prior history.     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HCC)     Perforation of tympanic membrane     Personal history of prostate cancer     Prostate cancer ([address]     diagnosed 2/24/11    SOB (shortness of breath)     Stridor 3/24/2016    Tracheal mass -- reproted sub-glotic 3/25/2016    Tracheal obstruction 3/24/2016    Tracheostomy in place [facility]) 6/20/2016    Vocal cord paralysis 4/20/2016      Past Surgical History:   Procedure Laterality Date    HX GI      esophageal dilation    HX HEENT  2009    Removal lymph nodes/saliva glands and radiation/chemo    HX HEENT  2010    left total parotidectomy & bilateral radical neck dissection    HX MYRINGOTOMY Bilateral 2010, 2011, 2012 2010-Dr. Angie Reynoso  5/2011     Dr. Bing Argueta in [place]    lymph nodes removed      Social History   Substance Use Topics    Smoking status: Former Smoker     Packs/day: 0.25     Years: 8.00     Types: Cigarettes     Quit date: 4/20/1976    Smokeless tobacco: Never Used      Comment: Quit in the 1970s    Alcohol use No      Family History   Problem Relation Age of Onset    Hypertension Mother     Cancer Mother      breast    Hypertension Father     Heart Disease Father       Allergies   Allergen Reactions    Ativan [Lorazepam] Other (comments)     Tremors    Levofloxacin Other (comments)     Muscle cramps/pain    Other Plant, Animal, Environmental Unknown (comments)     Sulfate      Sulfa (Sulfonamide Antibiotics) Shortness of Breath and Other (comments)      Prior to Admission Medications   Prescriptions Last Dose Informant Patient Reported? Taking? HYDROcodone-acetaminophen (NORCO)  mg tablet   No No   Sig: Take 1 Tab by mouth every eight (8) hours as needed for Pain. Max Daily Amount: 3 Tabs. Omeprazole delayed release (PRILOSEC D/R) 20 mg tablet   Yes No   Sig: Take 20 mg by mouth daily. aspirin 81 mg chewable tablet   No No   Sig: Take 1 Tab by mouth daily. clonazePAM (KLONOPIN) 0.5 mg tablet   Yes No   Sig: Take 0.5 mg by mouth three (3) times daily as needed. Indications: rarely uses   fentaNYL (DURAGESIC) 50 mcg/hr PATCH   No No   Si Patch by TransDERmal route every seventy-two (72) hours. Max Daily Amount: 1 Patch.   gabapentin (NEURONTIN) 100 mg capsule   No No   Si Cap by Per G Tube route three (3) times daily. levoFLOXacin (LEVAQUIN) 750 mg tablet   No No   Sig: Take 1 Tab by mouth every twenty-four (24) hours for 14 days. levothyroxine (SYNTHROID) 100 mcg tablet   Yes No   Sig: Take 137 mcg by mouth Daily (before breakfast). metoprolol tartrate (LOPRESSOR) 25 mg tablet   No No   Sig: Take 1 Tab by mouth two (2) times a day. mirtazapine (REMERON) 15 mg tablet   No No   Sig: Take 1 Tab by mouth nightly. ondansetron hcl (ZOFRAN) 8 mg tablet   No No   Sig: Take 1 Tab by mouth every eight (8) hours as needed for Nausea. polyethylene glycol (MIRALAX) 17 gram packet   No No   Sig: Take 1 Packet by mouth daily. predniSONE (DELTASONE) 10 mg tablet   No No   Sig: Take 3 tablets daily for 3 days then, take 2 tablets daily for 3 days then, take 1 tablet daily for 3 days then, take 1/2 tablet daily for 3 days then stop.   sertraline (ZOLOFT) 50 mg tablet   No No   Sig: Take 1 Tab by mouth daily.       Facility-Administered Medications: None       MEDS SCHEDULED:    Current Facility-Administered Medications   Medication Dose Route Frequency    sodium chloride 0.9 % bolus infusion 1,000 mL  1,000 mL IntraVENous ONCE     Current Outpatient Prescriptions   Medication Sig    polyethylene glycol (MIRALAX) 17 gram packet Take 1 Packet by mouth daily.  levoFLOXacin (LEVAQUIN) 750 mg tablet Take 1 Tab by mouth every twenty-four (24) hours for 14 days.  gabapentin (NEURONTIN) 100 mg capsule 1 Cap by Per G Tube route three (3) times daily.  predniSONE (DELTASONE) 10 mg tablet Take 3 tablets daily for 3 days then, take 2 tablets daily for 3 days then, take 1 tablet daily for 3 days then, take 1/2 tablet daily for 3 days then stop.  aspirin 81 mg chewable tablet Take 1 Tab by mouth daily.  metoprolol tartrate (LOPRESSOR) 25 mg tablet Take 1 Tab by mouth two (2) times a day.  mirtazapine (REMERON) 15 mg tablet Take 1 Tab by mouth nightly.  Omeprazole delayed release (PRILOSEC D/R) 20 mg tablet Take 20 mg by mouth daily.  fentaNYL (DURAGESIC) 50 mcg/hr PATCH 1 Patch by TransDERmal route every seventy-two (72) hours. Max Daily Amount: 1 Patch.  HYDROcodone-acetaminophen (NORCO)  mg tablet Take 1 Tab by mouth every eight (8) hours as needed for Pain. Max Daily Amount: 3 Tabs.  sertraline (ZOLOFT) 50 mg tablet Take 1 Tab by mouth daily.  ondansetron hcl (ZOFRAN) 8 mg tablet Take 1 Tab by mouth every eight (8) hours as needed for Nausea.  levothyroxine (SYNTHROID) 100 mcg tablet Take 137 mcg by mouth Daily (before breakfast).  clonazePAM (KLONOPIN) 0.5 mg tablet Take 0.5 mg by mouth three (3) times daily as needed.  Indications: rarely uses         Review of Systems  Constitutional: positive for fatigue and malaise  Respiratory: positive for cough, hemoptysis or dyspnea on exertion  Cardiovascular: negative for chest pain, chest pressure/discomfort, irregular heart beats, near-syncope  Musculoskeletal:positive for debility    Objective:     Vitals:    03/13/18 1425 03/13/18 1428 03/13/18 1500 03/13/18 1504 BP: 104/65   115/72   Pulse: 80   81   Resp: 22   20   Temp: 98.6 °F (37 °C)      SpO2: 99% 99% 99% 99%   Weight: 126 lb (57.2 kg)      Height: 5' 6\" (1.676 m)                  PHYSICAL EXAM     Physical Exam:   General:  Alert, debility. Eyes:  Conjunctivae/corneas clear. Nose: Nares patent and moist. Septum midline. Mouth/Throat: With lymph edema    Neck:  trach, + bloody secretions (chronic)   Respiratory:   Few scattered rhonchi to auscultation bilaterally on RA   Cardiovascular:  Regular rate and rhythm, S1, S2, no murmur, click, rub or gallop. Telemetry monitor:NSR   GI:   Abdomen soft, non-tender. Bowel sounds active X 4 Q. PEG     Musculoskeletal: Extremities symmetrical, atraumatic, no cyanosis, no edema. Pulses: 2+ and symmetric all extremities. Skin: Skin color, texture, turgor normal. No rashes or lesions       Neurologic: Generalized weakness. Alert and oriented. Activity: with assistance  Nutrition:PEG      CULTURES:recent sputum with pseudomonas    LABS    Recent Labs      03/13/18   1431   WBC  10.4   HGB  11.6*   HCT  36.9*   PLT  403     Recent Labs      03/13/18   1431   NA  139   K  4.6   CL  99   GLU  157*   CO2  31   BUN  21   CREA  0.59*         Assessment:     Hospital Problems  Date Reviewed: 2/27/2018          Codes Class Noted POA    Laryngeal cancer ([facility] (Chronic) ICD-10-CM: C32.9  ICD-9-CM: 161.9  2/23/2018 Yes        Tracheostomy dependence (HCC) (Chronic) ICD-10-CM: Z93.0  ICD-9-CM: V44.0  2/23/2018 Yes        Pleural effusion ICD-10-CM: J90  ICD-9-CM: 511.9  9/1/2017 Yes        Tracheal mass -- reproted sub-glotic (Chronic) ICD-10-CM: J39.8  ICD-9-CM: 786.6  3/25/2016 Yes        Malignant neoplasm of prostate (HCC) (Chronic) ICD-10-CM: C61  ICD-9-CM: 185  Unknown Yes        Malignant neoplasm of head, face, and neck ([facility] -- prior history.  (Chronic) ICD-10-CM: C76.0  ICD-9-CM: 195.0  Unknown Yes        Malignant neoplasm of parotid gland (HCC) (Chronic) ICD-10-CM: C07  ICD-9-CM: 142.0  Unknown Yes              Plan:   Evaluate effusion for thorcentesis  Continue levaquin  On RA currently with sat of 95%, ? Mucous plug vs obstruction with position    [employer], NP-C    More than 50% of time documented was spent in face-to-face contact with the patient and in the care of the patient on the floor/unit where the patient is located. Lungs:  Decreased breath sounds on left  Heart:  RRR with no Murmur/Rubs/Gallops    Additional Comments: Will proceed with thoracentesis, should be ok to go home after thoracentesis    I have spoken with and examined the patient. I agree with the above assessment and plan as documented.     Shemar Ball MD

## 2018-03-13 NOTE — ED TRIAGE NOTES
Pt arrives to ER after being at 68% RA today when home health arrived. Pt does not use home oxygen at home but is now needing it per family. Pt is in end stage lung cancer and is to be put on hospice. Pt is 85% on RA on arrival.   Pt has also increased fentanyl patches from 25 to 50mcg recently.  Other VS HR 82-84

## 2018-03-13 NOTE — DISCHARGE INSTRUCTIONS
Learning About Pleural Effusion  What is pleural effusion? Pleural effusion (say \"PLER-bhanu sc-KOIE-xaty\") is the buildup of fluid in the space between tissues lining the lungs and chest wall. Because of the fluid buildup, the lungs may not be able to expand completely, which can make it hard to breathe. The lung, or part of it, may collapse. Pleural effusion has many causes, such as pneumonia, cancer, inflammation of the tissues around the lungs, and heart failure. Pleural effusion is usually diagnosed with an X-ray and a physical exam. The doctor listens to the air flow in your lungs. What are the symptoms? Symptoms of pleural effusion may include:  · Trouble breathing. · Shortness of breath. · Chest pain. · Fever. · A cough. Minor pleural effusion may not cause any symptoms. How is pleural effusion treated? Doctors may need to treat the condition that is causing pleural effusion. For example, you may get medicines to treat pneumonia or congestive heart failure. Minor pleural effusion often goes away on its own without treatment. Removing fluid  Pleural effusion can be treated by removing fluid from the space between the tissues around the lungs. This is done with a needle that's put into the chest (thoracentesis). A small amount of the fluid may be sent to a lab to find out what is causing the buildup of fluid. Removing the fluid may help to relieve symptoms, such as shortness of breath and chest pain. It can help the lungs to expand more fully. In some cases, if pleural effusion doesn't get better, a catheter may be placed in the chest. This is a flexible tube that allows fluid to drain from the lungs. The catheter stays in the chest until the doctor removes it. Some people may get a treatment that removes the fluid and then puts a medicine into the chest cavity. This helps to prevent too much fluid from building up again. Follow-up care is a key part of your treatment and safety.  Be sure to make and go to all appointments, and call your doctor if you are having problems. It's also a good idea to know your test results and keep a list of the medicines you take. Where can you learn more? Go to http://carol-joanie.info/. Enter A920 in the search box to learn more about \"Learning About Pleural Effusion. \"  Current as of: May 12, 2017  Content Version: 11.4  © 0642-3649 [employer]. Care instructions adapted under license by [employer] (which disclaims liability or warranty for this information). If you have questions about a medical condition or this instruction, always ask your healthcare professional. [address] any warranty or liability for your use of this information.

## 2018-03-13 NOTE — ED NOTES
I have reviewed medications, follow up provider options, and discharge instructions with the caregiver. The caregiver verbalized understanding. Copy of discharge information given to caregiver upon discharge. Patient discharged in no distress. Patient awaiting [facility] transport home. No questions at this time.

## 2018-04-07 NOTE — DISCHARGE INSTRUCTIONS
Home Tube Feeding: Care Instructions  Your Care Instructions  Tube feeding is a way of providing nutrition and fluids through a tube into the stomach or intestines. The tube may be inserted through the skin and into the stomach during surgery, or it may go through the mouth or nose, down the throat, and then into the stomach. Tube feeding can nourish people who have a short illness that makes swallowing difficult or people who have a severe illness, and it may prolong life. Follow-up care is a key part of your treatment and safety. Be sure to make and go to all appointments, and call your doctor if you are having problems. It's also a good idea to know your test results and keep a list of the medicines you take. How can you care for yourself at home? · Follow your doctor's instructions for use and care of the feeding tube. Your doctor will:  Adry Mistryns you what tube feeding formula and fluids to put through the tube. ¨ Show you how to care for the skin around the tube. Be sure to follow instructions on keeping the area clean. ¨ Teach you how to watch for infection or blockage of the tube. ¨ Tell you what activities you can do. · Keep the formula in the refrigerator after opening it. Do not let the formula in the hanging bag sit out at room temperature for more than 8 hours. For the caregiver  · Wash your hands before handling the tube and formula. Wash the top of the can of formula before you open it. · Tube feedings that go into the stomach: The person you are caring for needs to be sitting up or have his or her head up during the feeding and for 30 minutes afterward. These feedings can be given in about 30 minutes, five or six times throughout a day. · Tube feedings that go into the intestine: The person you are caring for will have a pump that slowly pushes the formula into the intestine over several hours. This is often done at night.   · If nausea, diarrhea, or stomach cramps happen during feeding, slow the rate that the formula comes through the tube. Then gradually increase the amount as the person can tolerate it. · Flush the tube with plain water after each feeding to keep it clean. Do not put anything other than formula or water through the tube unless your doctor has told you to. · Take care of yourself. ¨ Do not try to do everything yourself. Ask other family members to help, and find out what other types of help may be available. ¨ Eat well and get enough rest. Make sure you do not ignore your own health while you are caring for your loved one. ¨ Schedule time for yourself. Get out of the house to do things you enjoy, run errands, or go shopping. When should you call for help? Call your doctor now or seek immediate medical care if:  ? · You have signs of infection, such as:  ¨ Increased pain, swelling, warmth, or redness around the tube. ¨ Red streaks leading from the area where the tube is inserted. ¨ Pus draining from the tube area. ¨ A fever. ? · The tube comes out or becomes blocked. ? · You have nausea, vomiting, or diarrhea. ? Watch closely for changes in your health, and be sure to contact your doctor if:  ? · You have any problems with your feeding. Where can you learn more? Go to http://carol-joanie.info/. Enter D393 in the search box to learn more about \"Home Tube Feeding: Care Instructions. \"  Current as of: May 12, 2017  Content Version: 11.4  © 0650-2437 [employer]. Care instructions adapted under license by [employer] (which disclaims liability or warranty for this information). If you have questions about a medical condition or this instruction, always ask your healthcare professional. [place] [address] any warranty or liability for your use of this information.

## 2018-04-07 NOTE — ED PROVIDER NOTES
HPI Comments: Patient is in hospice care and his PEG tube fell out. Patient is a 79 y.o. male presenting with feeding tube problem. The history is provided by the EMS personnel. The history is limited by the condition of the patient. Feeding Tube Problem    This is a new problem. The problem occurs constantly. Past Medical History:   Diagnosis Date    Airway obstruction 4/20/2016    Allergic rhinitis     Anxiety 3/24/2016    BPH (benign prostatic hypertrophy)     Cerumen impaction     Chronic otitis media     Chronic pharyngitis     Condyloma acuminatum     Dyslipidemia 3/24/2016    Dyspnea 4/8/2016    ETD (eustachian tube dysfunction)     Former smoker     GERD (gastroesophageal reflux disease)     at times    History of condyloma acuminatum     on tongue    History of parotid cancer      History of sinus cancer 1989    Hypercholesterolemia 9/14/2016    Hypertension     Hypotension 4/21/2016    Hypothyroidism      secondary to radiation to neck    Ill-defined condition     left face twitching    Impotence of organic origin     Male stress incontinence 12/23/2014    Malignant neoplasm of head, face, and neck ([address] -- prior history.     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HCC)     Perforation of tympanic membrane     Personal history of prostate cancer     Prostate cancer ([address]     diagnosed 2/24/11    SOB (shortness of breath)     Stridor 3/24/2016    Tracheal mass -- reproted sub-glotic 3/25/2016    Tracheal obstruction 3/24/2016    Tracheostomy in place [facility]) 6/20/2016    Vocal cord paralysis 4/20/2016       Past Surgical History:   Procedure Laterality Date    HX GI      esophageal dilation    HX HEENT  2009    Removal lymph nodes/saliva glands and radiation/chemo    HX HEENT  2010    left total parotidectomy & bilateral radical neck dissection    HX MYRINGOTOMY Bilateral 2010, 2011, 2012 2010-Dr. Donnie Simpson  5/2011     Dr. Acacia Donovan in [facility]    lymph nodes removed         Family History:   Problem Relation Age of Onset    Hypertension Mother     Cancer Mother      breast    Hypertension Father     Heart Disease Father        Social History     Social History    Marital status: SINGLE     Spouse name: N/A    Number of children: N/A    Years of education: N/A     Occupational History    [employer]      11 years     Social History Main Topics    Smoking status: Former Smoker     Packs/day: 0.25     Years: 8.00     Types: Cigarettes     Quit date: 4/20/1976    Smokeless tobacco: Never Used      Comment: Quit in the 1970s    Alcohol use No    Drug use: No    Sexual activity: Not on file     Other Topics Concern    Not on file     Social History Narrative    Lives at home and his sister and son live with him. ALLERGIES: Ativan [lorazepam]; Levofloxacin; Other plant, animal, environmental; and Sulfa (sulfonamide antibiotics)    Review of Systems   Unable to perform ROS: Patient nonverbal       Vitals:    04/07/18 1546   BP: (!) 203/88   Pulse: (!) 103   Resp: 20   Temp: 98.5 °F (36.9 °C)   SpO2: 100%   Weight: 54.4 kg (120 lb)   Height: 5' 6\" (1.676 m)            Physical Exam   Constitutional: He appears lethargic. He appears cachectic. He appears ill. No distress. Abdominal: Soft. There is no tenderness. Neurological: He appears lethargic. Skin: Skin is warm and dry. He is not diaphoretic. Nursing note and vitals reviewed.        MDM  Number of Diagnoses or Management Options  Feeding tube dysfunction, initial encounter:   Diagnosis management comments: Patient's PEG tube was replaced and confirmed with x-ray       Amount and/or Complexity of Data Reviewed  Tests in the radiology section of CPT®: reviewed and ordered    Risk of Complications, Morbidity, and/or Mortality  Presenting problems: minimal  Diagnostic procedures: minimal  Management options: minimal    Patient Progress  Patient progress: improved        ED Course       Procedures

## 2018-04-07 NOTE — ED NOTES
I have reviewed discharge instructions with the caregiver. The caregiver verbalized understanding. Patient left ED via Discharge Method: stretcher to Home with sister via [employer] for questions and clarification provided. Patient given 0 scripts. To continue your aftercare when you leave the hospital, you may receive an automated call from our care team to check in on how you are doing. This is a free service and part of our promise to provide the best care and service to meet your aftercare needs.  If you have questions, or wish to unsubscribe from this service please call [phone]. Thank you for Choosing our [facility] [employer] [facility].

## 2018-12-03 NOTE — PROGRESS NOTES
[facility]  Admission Date: 2/22/2018             Daily Progress Note: 3/5/2018    The patient's chart is reviewed and the patient is discussed with the staff. Subjective:     68yo AAM with h/o laryngeal cancer, permanent trach, admitted for change in secretions and SOB. CXR revealing right sided PNA. Culture back with pseudomonas. He has chronic trach for laryngeal cancer. Reviewed with him that typically treat for 7-14 days if not longer with this organism. Sensitive to FQ unfortunately he is allergic to these (though the allergy listed is muscle cramps).   Was planning to discharge but developed bleeding from into trach so ent was consulted  Current Facility-Administered Medications   Medication Dose Route Frequency    predniSONE (DELTASONE) tablet 30 mg  30 mg Oral DAILY WITH BREAKFAST    fentaNYL (DURAGESIC) 25 mcg/hr patch 1 Patch  1 Patch TransDERmal Q72H    camphor-menthol (SARNA) 0.5-0.5 % lotion   Topical BID PRN    white petrolatum-mineral oil (EUCERIN) cream   Topical PRN    levoFLOXacin (LEVAQUIN) tablet 750 mg  750 mg Oral Q24H    pantoprazole (PROTONIX) tablet 40 mg  40 mg Oral ACB    morphine 2 mg  2 mg IntraVENous Q4H PRN    polyethylene glycol (MIRALAX) packet 17 g  17 g Oral DAILY    docusate (COLACE) 50 mg/5 mL oral liquid 100 mg  100 mg Oral BID    gabapentin (NEURONTIN) capsule 100 mg  100 mg Per G Tube TID    aspirin chewable tablet 81 mg  81 mg Oral DAILY    clonazePAM (KlonoPIN) tablet 0.5 mg  0.5 mg Oral BID    HYDROcodone-acetaminophen (NORCO)  mg tablet 2 Tab  2 Tab Oral Q6H PRN    levothyroxine (SYNTHROID) tablet 137 mcg  137 mcg Oral ACB    metoprolol tartrate (LOPRESSOR) tablet 25 mg  25 mg Oral BID    mirtazapine (REMERON) tablet 15 mg  15 mg Oral QHS    sertraline (ZOLOFT) tablet 50 mg  50 mg Oral DAILY    sodium chloride (NS) flush 5-10 mL  5-10 mL IntraVENous Q8H    sodium chloride (NS) flush 5-10 mL  5-10 mL IntraVENous PRN    Telephone Encounter by Wilbert Andre RN at 09/14/18 07:29 AM     Author:  Wilbert Andre RN Service:  (none) Author Type:  Registered Nurse     Filed:  09/14/18 07:33 AM Encounter Date:  9/14/2018 Status:  Signed     :  Wilbert Andre RN (Registered Nurse)            Order Information           Date Department  Ordering/Authorizing      9/13/2018 [facility] -  Internal Medicine / [facility]  Dreyer, Roman, MD             Order Providers               Name  NPI #     Authorizing Provider  Dreyer, Roman, MD  [id number]            Associated Diagnoses           Screen for colon cancer [Z12.11]                    Comments           Age: 68 year old   BMI is 27.93 kg/(m^2) calculated from:     Height 6' 0\" (1.829 m) as of 9/13/18     Weight 206 lb (93.441 kg) as of 9/13/18      Does patient have any acute Gi symptoms (blood in stool, rectal bleeding, diarrhea, constipation, severe heartburn, difficulty swallowing, nausea/vomiting, etc.)? No     Does the patient have cardiac issues? No     Current Outpatient Prescriptions:  lovastatin (MEVACOR) 20 MG tablet, Take 1 Tab by mouth nightly.  losartan-hydrochlorothiazide (HYZAAR) 100-12.5 MG per tablet, Take 1 Tab by mouth daily.  allopurinol (ZYLOPRIM) 100 MG tablet, Take 1 Tab by mouth daily.        [SM1.1C]          Colon letter mailed per protocol.[SM1.1T]    Electronically Signed by:    Wilbert Andre RN , 9/14/2018[SM1.2T]        Revision History        User Key Date/Time User Provider Type Action    > SM1.2 09/14/18 07:33 AM Wilbert Andre RN Registered Nurse Sign     SM1.1 09/14/18 07:29 AM Wilbert Andre RN Registered Nurse     C - Copied, T - Template             enoxaparin (LOVENOX) injection 40 mg  40 mg SubCUTAneous Q24H    budesonide (PULMICORT) 500 mcg/2 ml nebulizer suspension  500 mcg Nebulization BID RT       Review of Systems    Constitutional: negative for fever, chills, sweats  Cardiovascular: negative for chest pain, palpitations, syncope, edema  Gastrointestinal:  negative for dysphagia, reflux, vomiting, diarrhea, abdominal pain, or melena  Neurologic:  negative for focal weakness, numbness, headache    Objective:     Vitals:    03/04/18 2312 03/05/18 0341 03/05/18 0743 03/05/18 0800   BP: 102/64 102/63  117/71   Pulse: 84 85  89   Resp: 18 18  16   Temp: 99.5 °F (37.5 °C) 99 °F (37.2 °C)  97.5 °F (36.4 °C)   SpO2: 96% 99% 95% 96%   Weight:       Height:         Intake and Output:   03/03 1901 - 03/05 0700  In: 2160   Out: -   03/05 0701 - 03/05 1900  In: 600   Out: -     Physical Exam:   Constitution:  the patient has swollen lower lip  EENMT:  Sclera clear, pupils equal, oral mucosa moist  Respiratory: clear  Cardiovascular:  RRR without M,G,R  Gastrointestinal: soft and non-tender; with positive bowel sounds. Musculoskeletal: warm without cyanosis. There is no lower leg edema. Skin:  no jaundice or rashes, no wounds   Neurologic: no gross neuro deficits     Psychiatric:  alert     CXR:       LAB  No results for input(s): GLUCPOC in the last 72 hours. No lab exists for component: [place]   Recent Labs      03/03/18   0623   WBC  10.3   HGB  12.6*   HCT  39.3*   PLT  343     Recent Labs      03/03/18   0623   NA  139   K  4.5   CL  101   CO2  31   GLU  137*   BUN  26*   CREA  0.57*   CA  8.3     No results for input(s): PH, PCO2, PO2, HCO3 in the last 72 hours. No results for input(s): LCAD, LAC in the last 72 hours.       Assessment:  (Medical Decision Making)     Hospital Problems  Date Reviewed: 2/27/2018          Codes Class Noted POA    Hemoptysis ICD-10-CM: R04.2  ICD-9-CM: 786.30  3/5/2018 Unknown    New probably coming from laryngeal tumor Constipation (Chronic) ICD-10-CM: K59.00  ICD-9-CM: 564.00  2/27/2018 No        Hypothyroidism (Chronic) ICD-10-CM: E03.9  ICD-9-CM: 244.9  2/23/2018 Yes        GERD (gastroesophageal reflux disease) (Chronic) ICD-10-CM: K21.9  ICD-9-CM: 530.81  2/23/2018 Yes    Overview Signed 3/21/2016 11:50 AM by Saige Ruth     controlled with medication             Tracheal obstruction ICD-10-CM: J39.8  ICD-9-CM: 519.19  2/23/2018 Yes        * (Principal)Community acquired pneumonia of right upper lobe of lung [facility]) ICD-10-CM: J18.1  ICD-9-CM: 831  2/23/2018 Yes    levaquin    Laryngeal cancer ([facility] ICD-10-CM: C32.9  ICD-9-CM: 161.9  2/23/2018 Yes        Tracheostomy dependence ([facility] ICD-10-CM: Z93.0  ICD-9-CM: V44.0  2/23/2018 Yes              Plan:  (Medical Decision Making)   1    ent to see regarding bleeding from laryngeal tumor  2   levaquin  Since 3/1  --    More than 50% of the time documented was spent in face-to-face contact with the patient and in the care of the patient on the floor/unit where the patient is located.     Lucy Montgomery MD

## (undated) DEVICE — STERILE POLYISOPRENE POWDER-FREE SURGICAL GLOVES: Brand: PROTEXIS

## (undated) DEVICE — KIT THORCENT 8FR L5IN POLYUR W/ 18/22/25GA NDL 3 W STPCOCK

## (undated) DEVICE — GEL MEDC ULTRASOUND 5L -- REPLACED BY 326862